# Patient Record
Sex: MALE | Race: ASIAN | NOT HISPANIC OR LATINO
[De-identification: names, ages, dates, MRNs, and addresses within clinical notes are randomized per-mention and may not be internally consistent; named-entity substitution may affect disease eponyms.]

---

## 2017-03-23 ENCOUNTER — NON-APPOINTMENT (OUTPATIENT)
Age: 72
End: 2017-03-23

## 2017-03-23 ENCOUNTER — APPOINTMENT (OUTPATIENT)
Dept: CARDIOLOGY | Facility: CLINIC | Age: 72
End: 2017-03-23

## 2017-03-23 VITALS
SYSTOLIC BLOOD PRESSURE: 132 MMHG | WEIGHT: 154 LBS | HEIGHT: 64 IN | DIASTOLIC BLOOD PRESSURE: 62 MMHG | OXYGEN SATURATION: 97 % | BODY MASS INDEX: 26.29 KG/M2 | HEART RATE: 58 BPM

## 2017-04-05 LAB
24R-OH-CALCIDIOL SERPL-MCNC: 61.3 PG/ML
ALBUMIN SERPL ELPH-MCNC: 4.5 G/DL
ALP BLD-CCNC: 58 U/L
ALT SERPL-CCNC: 36 U/L
ANION GAP SERPL CALC-SCNC: 12 MMOL/L
AST SERPL-CCNC: 34 U/L
BASOPHILS # BLD AUTO: 0.03 K/UL
BASOPHILS NFR BLD AUTO: 0.6 %
BILIRUB SERPL-MCNC: 0.5 MG/DL
BUN SERPL-MCNC: 12 MG/DL
CALCIUM SERPL-MCNC: 9.5 MG/DL
CHLORIDE SERPL-SCNC: 101 MMOL/L
CHOLEST SERPL-MCNC: 125 MG/DL
CHOLEST/HDLC SERPL: 2.9 RATIO
CO2 SERPL-SCNC: 28 MMOL/L
CREAT SERPL-MCNC: 0.99 MG/DL
EOSINOPHIL # BLD AUTO: 0.1 K/UL
EOSINOPHIL NFR BLD AUTO: 2 %
GLUCOSE SERPL-MCNC: 97 MG/DL
HBA1C MFR BLD HPLC: 5.9 %
HCT VFR BLD CALC: 43 %
HDLC SERPL-MCNC: 43 MG/DL
HGB BLD-MCNC: 14.4 G/DL
IMM GRANULOCYTES NFR BLD AUTO: 0.2 %
LDLC SERPL CALC-MCNC: 48 MG/DL
LYMPHOCYTES # BLD AUTO: 1.76 K/UL
LYMPHOCYTES NFR BLD AUTO: 34.9 %
MAN DIFF?: NORMAL
MCHC RBC-ENTMCNC: 29.1 PG
MCHC RBC-ENTMCNC: 33.5 GM/DL
MCV RBC AUTO: 86.9 FL
MONOCYTES # BLD AUTO: 0.53 K/UL
MONOCYTES NFR BLD AUTO: 10.5 %
NEUTROPHILS # BLD AUTO: 2.61 K/UL
NEUTROPHILS NFR BLD AUTO: 51.8 %
PLATELET # BLD AUTO: 139 K/UL
POTASSIUM SERPL-SCNC: 4.7 MMOL/L
PROT SERPL-MCNC: 7.4 G/DL
PSA SERPL-MCNC: 3.28 NG/ML
RBC # BLD: 4.95 M/UL
RBC # FLD: 13.7 %
SODIUM SERPL-SCNC: 141 MMOL/L
TRIGL SERPL-MCNC: 169 MG/DL
TSH SERPL-ACNC: 3.56 UIU/ML
WBC # FLD AUTO: 5.04 K/UL

## 2017-05-15 ENCOUNTER — RX RENEWAL (OUTPATIENT)
Age: 72
End: 2017-05-15

## 2017-05-16 ENCOUNTER — MEDICATION RENEWAL (OUTPATIENT)
Age: 72
End: 2017-05-16

## 2017-06-07 ENCOUNTER — APPOINTMENT (OUTPATIENT)
Dept: CARDIOLOGY | Facility: CLINIC | Age: 72
End: 2017-06-07

## 2017-06-22 ENCOUNTER — APPOINTMENT (OUTPATIENT)
Dept: CARDIOLOGY | Facility: CLINIC | Age: 72
End: 2017-06-22

## 2017-06-22 ENCOUNTER — NON-APPOINTMENT (OUTPATIENT)
Age: 72
End: 2017-06-22

## 2017-06-22 VITALS
BODY MASS INDEX: 26.8 KG/M2 | HEIGHT: 64 IN | DIASTOLIC BLOOD PRESSURE: 62 MMHG | SYSTOLIC BLOOD PRESSURE: 111 MMHG | HEART RATE: 66 BPM | OXYGEN SATURATION: 96 % | WEIGHT: 157 LBS

## 2017-11-13 ENCOUNTER — NON-APPOINTMENT (OUTPATIENT)
Age: 72
End: 2017-11-13

## 2017-11-13 ENCOUNTER — APPOINTMENT (OUTPATIENT)
Dept: CARDIOLOGY | Facility: CLINIC | Age: 72
End: 2017-11-13
Payer: MEDICARE

## 2017-11-13 VITALS — HEART RATE: 52 BPM | OXYGEN SATURATION: 97 % | DIASTOLIC BLOOD PRESSURE: 74 MMHG | SYSTOLIC BLOOD PRESSURE: 145 MMHG

## 2017-11-13 VITALS — BODY MASS INDEX: 26.8 KG/M2 | HEIGHT: 64 IN | WEIGHT: 157 LBS

## 2017-11-13 VITALS — DIASTOLIC BLOOD PRESSURE: 68 MMHG | SYSTOLIC BLOOD PRESSURE: 120 MMHG

## 2017-11-13 PROCEDURE — 93000 ELECTROCARDIOGRAM COMPLETE: CPT

## 2017-11-13 PROCEDURE — 99214 OFFICE O/P EST MOD 30 MIN: CPT | Mod: 25

## 2017-11-13 RX ORDER — DOXYCYCLINE HYCLATE 100 MG/1
100 CAPSULE ORAL
Qty: 20 | Refills: 0 | Status: DISCONTINUED | COMMUNITY
Start: 2017-06-22 | End: 2017-11-13

## 2018-01-17 ENCOUNTER — APPOINTMENT (OUTPATIENT)
Dept: ORTHOPEDIC SURGERY | Facility: CLINIC | Age: 73
End: 2018-01-17
Payer: MEDICARE

## 2018-01-17 VITALS
HEIGHT: 62 IN | HEART RATE: 63 BPM | WEIGHT: 158 LBS | DIASTOLIC BLOOD PRESSURE: 73 MMHG | SYSTOLIC BLOOD PRESSURE: 135 MMHG | BODY MASS INDEX: 29.08 KG/M2

## 2018-01-17 DIAGNOSIS — M65.311 TRIGGER THUMB, RIGHT THUMB: ICD-10-CM

## 2018-01-17 PROCEDURE — 99204 OFFICE O/P NEW MOD 45 MIN: CPT

## 2018-01-17 PROCEDURE — 72170 X-RAY EXAM OF PELVIS: CPT | Mod: 59

## 2018-01-17 PROCEDURE — 72110 X-RAY EXAM L-2 SPINE 4/>VWS: CPT

## 2018-01-20 ENCOUNTER — APPOINTMENT (OUTPATIENT)
Dept: MRI IMAGING | Facility: CLINIC | Age: 73
End: 2018-01-20
Payer: MEDICARE

## 2018-01-20 ENCOUNTER — OUTPATIENT (OUTPATIENT)
Dept: OUTPATIENT SERVICES | Facility: HOSPITAL | Age: 73
LOS: 1 days | End: 2018-01-20
Payer: MEDICARE

## 2018-01-20 ENCOUNTER — APPOINTMENT (OUTPATIENT)
Dept: MRI IMAGING | Facility: CLINIC | Age: 73
End: 2018-01-20

## 2018-01-20 DIAGNOSIS — M48.062 SPINAL STENOSIS, LUMBAR REGION WITH NEUROGENIC CLAUDICATION: ICD-10-CM

## 2018-01-20 DIAGNOSIS — M54.5 LOW BACK PAIN: ICD-10-CM

## 2018-01-20 PROCEDURE — 72141 MRI NECK SPINE W/O DYE: CPT | Mod: 26

## 2018-01-20 PROCEDURE — 72141 MRI NECK SPINE W/O DYE: CPT

## 2018-01-22 ENCOUNTER — APPOINTMENT (OUTPATIENT)
Dept: MRI IMAGING | Facility: CLINIC | Age: 73
End: 2018-01-22

## 2018-01-22 ENCOUNTER — OUTPATIENT (OUTPATIENT)
Dept: OUTPATIENT SERVICES | Facility: HOSPITAL | Age: 73
LOS: 1 days | End: 2018-01-22
Payer: MEDICARE

## 2018-01-22 DIAGNOSIS — Z00.8 ENCOUNTER FOR OTHER GENERAL EXAMINATION: ICD-10-CM

## 2018-01-22 PROCEDURE — 72148 MRI LUMBAR SPINE W/O DYE: CPT

## 2018-01-22 PROCEDURE — 72148 MRI LUMBAR SPINE W/O DYE: CPT | Mod: 26

## 2018-01-31 ENCOUNTER — APPOINTMENT (OUTPATIENT)
Dept: ORTHOPEDIC SURGERY | Facility: CLINIC | Age: 73
End: 2018-01-31
Payer: MEDICARE

## 2018-01-31 ENCOUNTER — FORM ENCOUNTER (OUTPATIENT)
Age: 73
End: 2018-01-31

## 2018-01-31 VITALS
DIASTOLIC BLOOD PRESSURE: 79 MMHG | SYSTOLIC BLOOD PRESSURE: 151 MMHG | HEART RATE: 64 BPM | HEIGHT: 62 IN | WEIGHT: 155 LBS | BODY MASS INDEX: 28.52 KG/M2

## 2018-01-31 PROCEDURE — 99214 OFFICE O/P EST MOD 30 MIN: CPT

## 2018-02-01 ENCOUNTER — OUTPATIENT (OUTPATIENT)
Dept: OUTPATIENT SERVICES | Facility: HOSPITAL | Age: 73
LOS: 1 days | End: 2018-02-01
Payer: MEDICARE

## 2018-02-01 ENCOUNTER — APPOINTMENT (OUTPATIENT)
Dept: CT IMAGING | Facility: CLINIC | Age: 73
End: 2018-02-01

## 2018-02-01 DIAGNOSIS — Z00.8 ENCOUNTER FOR OTHER GENERAL EXAMINATION: ICD-10-CM

## 2018-02-01 PROCEDURE — 72125 CT NECK SPINE W/O DYE: CPT

## 2018-02-01 PROCEDURE — 76376 3D RENDER W/INTRP POSTPROCES: CPT | Mod: 26

## 2018-02-01 PROCEDURE — 72125 CT NECK SPINE W/O DYE: CPT | Mod: 26

## 2018-02-01 PROCEDURE — 76376 3D RENDER W/INTRP POSTPROCES: CPT

## 2018-02-05 ENCOUNTER — APPOINTMENT (OUTPATIENT)
Dept: CARDIOLOGY | Facility: CLINIC | Age: 73
End: 2018-02-05
Payer: MEDICARE

## 2018-02-05 ENCOUNTER — NON-APPOINTMENT (OUTPATIENT)
Age: 73
End: 2018-02-05

## 2018-02-05 VITALS — OXYGEN SATURATION: 99 % | SYSTOLIC BLOOD PRESSURE: 150 MMHG | HEART RATE: 67 BPM | DIASTOLIC BLOOD PRESSURE: 73 MMHG

## 2018-02-05 VITALS — WEIGHT: 162 LBS | HEIGHT: 62 IN | BODY MASS INDEX: 29.81 KG/M2

## 2018-02-05 LAB
25(OH)D3 SERPL-MCNC: 26.9 NG/ML
ALBUMIN SERPL ELPH-MCNC: 4.5 G/DL
ALP BLD-CCNC: 56 U/L
ALT SERPL-CCNC: 39 U/L
ANION GAP SERPL CALC-SCNC: 12 MMOL/L
AST SERPL-CCNC: 34 U/L
BASOPHILS # BLD AUTO: 0.03 K/UL
BASOPHILS NFR BLD AUTO: 0.4 %
BILIRUB SERPL-MCNC: 0.7 MG/DL
BUN SERPL-MCNC: 13 MG/DL
CALCIUM SERPL-MCNC: 10.1 MG/DL
CHLORIDE SERPL-SCNC: 99 MMOL/L
CHOLEST SERPL-MCNC: 127 MG/DL
CHOLEST/HDLC SERPL: 3.3 RATIO
CK SERPL-CCNC: 277 U/L
CO2 SERPL-SCNC: 28 MMOL/L
CREAT SERPL-MCNC: 1.28 MG/DL
EOSINOPHIL # BLD AUTO: 0.18 K/UL
EOSINOPHIL NFR BLD AUTO: 2.5 %
GLUCOSE SERPL-MCNC: 94 MG/DL
HBA1C MFR BLD HPLC: 5.9 %
HCT VFR BLD CALC: 47.6 %
HDLC SERPL-MCNC: 39 MG/DL
HGB BLD-MCNC: 15.6 G/DL
IMM GRANULOCYTES NFR BLD AUTO: 0.1 %
LDLC SERPL CALC-MCNC: 57 MG/DL
LYMPHOCYTES # BLD AUTO: 2.63 K/UL
LYMPHOCYTES NFR BLD AUTO: 36.9 %
MAN DIFF?: NORMAL
MCHC RBC-ENTMCNC: 29.3 PG
MCHC RBC-ENTMCNC: 32.8 GM/DL
MCV RBC AUTO: 89.3 FL
MONOCYTES # BLD AUTO: 0.48 K/UL
MONOCYTES NFR BLD AUTO: 6.7 %
NEUTROPHILS # BLD AUTO: 3.79 K/UL
NEUTROPHILS NFR BLD AUTO: 53.4 %
PLATELET # BLD AUTO: 132 K/UL
POTASSIUM SERPL-SCNC: 4.9 MMOL/L
PROT SERPL-MCNC: 8.1 G/DL
PSA FREE FLD-MCNC: 28.6
PSA FREE SERPL-MCNC: 1.27 NG/ML
PSA SERPL-MCNC: 4.44 NG/ML
RBC # BLD: 5.33 M/UL
RBC # FLD: 13.5 %
SODIUM SERPL-SCNC: 139 MMOL/L
T4 FREE SERPL-MCNC: 1.1 NG/DL
TRIGL SERPL-MCNC: 157 MG/DL
TSH SERPL-ACNC: 5.42 UIU/ML
WBC # FLD AUTO: 7.12 K/UL

## 2018-02-05 PROCEDURE — 93000 ELECTROCARDIOGRAM COMPLETE: CPT

## 2018-02-05 PROCEDURE — 99214 OFFICE O/P EST MOD 30 MIN: CPT | Mod: 25

## 2018-02-09 ENCOUNTER — OUTPATIENT (OUTPATIENT)
Dept: OUTPATIENT SERVICES | Facility: HOSPITAL | Age: 73
LOS: 1 days | End: 2018-02-09
Payer: MEDICARE

## 2018-02-09 ENCOUNTER — APPOINTMENT (OUTPATIENT)
Dept: ORTHOPEDIC SURGERY | Facility: HOSPITAL | Age: 73
End: 2018-02-09

## 2018-02-09 DIAGNOSIS — M48.061 SPINAL STENOSIS, LUMBAR REGION WITHOUT NEUROGENIC CLAUDICATION: ICD-10-CM

## 2018-02-09 PROCEDURE — 64483 NJX AA&/STRD TFRM EPI L/S 1: CPT

## 2018-02-09 PROCEDURE — 64484 NJX AA&/STRD TFRM EPI L/S EA: CPT | Mod: LT

## 2018-02-09 PROCEDURE — 64483 NJX AA&/STRD TFRM EPI L/S 1: CPT | Mod: LT

## 2018-02-09 PROCEDURE — 77003 FLUOROGUIDE FOR SPINE INJECT: CPT

## 2018-02-09 PROCEDURE — 64484 NJX AA&/STRD TFRM EPI L/S EA: CPT

## 2018-02-12 ENCOUNTER — RX RENEWAL (OUTPATIENT)
Age: 73
End: 2018-02-12

## 2018-02-23 ENCOUNTER — APPOINTMENT (OUTPATIENT)
Dept: ORTHOPEDIC SURGERY | Facility: CLINIC | Age: 73
End: 2018-02-23
Payer: MEDICARE

## 2018-02-23 VITALS
SYSTOLIC BLOOD PRESSURE: 194 MMHG | BODY MASS INDEX: 28.34 KG/M2 | WEIGHT: 154 LBS | HEART RATE: 72 BPM | DIASTOLIC BLOOD PRESSURE: 77 MMHG | HEIGHT: 62 IN

## 2018-02-23 DIAGNOSIS — M48.8X9 OTHER SPECIFIED SPONDYLOPATHIES, SITE UNSPECIFIED: ICD-10-CM

## 2018-02-23 PROCEDURE — 99214 OFFICE O/P EST MOD 30 MIN: CPT

## 2018-02-23 RX ORDER — MELOXICAM 15 MG/1
15 TABLET ORAL
Qty: 30 | Refills: 2 | Status: DISCONTINUED | COMMUNITY
Start: 2018-01-17 | End: 2018-02-23

## 2018-02-23 RX ORDER — GABAPENTIN 300 MG/1
300 CAPSULE ORAL
Qty: 30 | Refills: 2 | Status: DISCONTINUED | COMMUNITY
Start: 2018-01-17 | End: 2018-02-23

## 2018-02-23 RX ORDER — TIZANIDINE 4 MG/1
4 TABLET ORAL
Qty: 50 | Refills: 0 | Status: DISCONTINUED | COMMUNITY
Start: 2018-01-17 | End: 2018-02-23

## 2018-07-19 ENCOUNTER — NON-APPOINTMENT (OUTPATIENT)
Age: 73
End: 2018-07-19

## 2018-07-19 ENCOUNTER — APPOINTMENT (OUTPATIENT)
Dept: CARDIOLOGY | Facility: CLINIC | Age: 73
End: 2018-07-19
Payer: MEDICARE

## 2018-07-19 VITALS
HEART RATE: 57 BPM | DIASTOLIC BLOOD PRESSURE: 68 MMHG | WEIGHT: 161 LBS | HEIGHT: 62 IN | OXYGEN SATURATION: 97 % | SYSTOLIC BLOOD PRESSURE: 126 MMHG | BODY MASS INDEX: 29.63 KG/M2

## 2018-07-19 LAB
25(OH)D3 SERPL-MCNC: 27.1 NG/ML
ALBUMIN SERPL ELPH-MCNC: 4.7 G/DL
ALP BLD-CCNC: 60 U/L
ALT SERPL-CCNC: 30 U/L
ANION GAP SERPL CALC-SCNC: 12 MMOL/L
AST SERPL-CCNC: 30 U/L
BASOPHILS # BLD AUTO: 0.02 K/UL
BASOPHILS NFR BLD AUTO: 0.3 %
BILIRUB SERPL-MCNC: 0.6 MG/DL
BUN SERPL-MCNC: 11 MG/DL
CALCIUM SERPL-MCNC: 9.4 MG/DL
CHLORIDE SERPL-SCNC: 104 MMOL/L
CHOLEST SERPL-MCNC: 130 MG/DL
CHOLEST/HDLC SERPL: 3.3 RATIO
CO2 SERPL-SCNC: 27 MMOL/L
CREAT SERPL-MCNC: 1.19 MG/DL
EOSINOPHIL # BLD AUTO: 0.29 K/UL
EOSINOPHIL NFR BLD AUTO: 4.7 %
GLUCOSE SERPL-MCNC: 106 MG/DL
HBA1C MFR BLD HPLC: 5.9 %
HCT VFR BLD CALC: 45.4 %
HDLC SERPL-MCNC: 39 MG/DL
HGB BLD-MCNC: 14.9 G/DL
IMM GRANULOCYTES NFR BLD AUTO: 0.2 %
LDLC SERPL CALC-MCNC: 54 MG/DL
LYMPHOCYTES # BLD AUTO: 2.14 K/UL
LYMPHOCYTES NFR BLD AUTO: 34.6 %
MAN DIFF?: NORMAL
MCHC RBC-ENTMCNC: 29.6 PG
MCHC RBC-ENTMCNC: 32.8 GM/DL
MCV RBC AUTO: 90.3 FL
MONOCYTES # BLD AUTO: 0.31 K/UL
MONOCYTES NFR BLD AUTO: 5 %
NEUTROPHILS # BLD AUTO: 3.41 K/UL
NEUTROPHILS NFR BLD AUTO: 55.2 %
PLATELET # BLD AUTO: 134 K/UL
POTASSIUM SERPL-SCNC: 4.8 MMOL/L
PROT SERPL-MCNC: 7.4 G/DL
RBC # BLD: 5.03 M/UL
RBC # FLD: 13.3 %
SODIUM SERPL-SCNC: 143 MMOL/L
TRIGL SERPL-MCNC: 185 MG/DL
TSH SERPL-ACNC: 5.6 UIU/ML
WBC # FLD AUTO: 6.18 K/UL

## 2018-07-19 PROCEDURE — 99214 OFFICE O/P EST MOD 30 MIN: CPT | Mod: 25

## 2018-07-19 PROCEDURE — 93000 ELECTROCARDIOGRAM COMPLETE: CPT

## 2018-07-27 ENCOUNTER — MEDICATION RENEWAL (OUTPATIENT)
Age: 73
End: 2018-07-27

## 2018-08-13 ENCOUNTER — MEDICATION RENEWAL (OUTPATIENT)
Age: 73
End: 2018-08-13

## 2018-10-10 ENCOUNTER — MEDICATION RENEWAL (OUTPATIENT)
Age: 73
End: 2018-10-10

## 2018-10-10 ENCOUNTER — RX RENEWAL (OUTPATIENT)
Age: 73
End: 2018-10-10

## 2018-10-12 ENCOUNTER — LABORATORY RESULT (OUTPATIENT)
Age: 73
End: 2018-10-12

## 2018-10-16 LAB
25(OH)D3 SERPL-MCNC: 25.2 NG/ML
ALBUMIN SERPL ELPH-MCNC: 4.9 G/DL
ALP BLD-CCNC: 58 U/L
ALT SERPL-CCNC: 29 U/L
ANION GAP SERPL CALC-SCNC: 12 MMOL/L
AST SERPL-CCNC: 27 U/L
BASOPHILS # BLD AUTO: 0.02 K/UL
BASOPHILS NFR BLD AUTO: 0.3 %
BILIRUB SERPL-MCNC: 0.6 MG/DL
BUN SERPL-MCNC: 13 MG/DL
CALCIUM SERPL-MCNC: 10 MG/DL
CHLORIDE SERPL-SCNC: 103 MMOL/L
CHOLEST SERPL-MCNC: 134 MG/DL
CHOLEST/HDLC SERPL: 3.4 RATIO
CO2 SERPL-SCNC: 29 MMOL/L
CREAT SERPL-MCNC: 1.21 MG/DL
EOSINOPHIL # BLD AUTO: 0.13 K/UL
EOSINOPHIL NFR BLD AUTO: 2.2 %
GLUCOSE SERPL-MCNC: 88 MG/DL
HBA1C MFR BLD HPLC: 6 %
HCT VFR BLD CALC: 45.3 %
HDLC SERPL-MCNC: 40 MG/DL
HGB BLD-MCNC: 14.4 G/DL
IMM GRANULOCYTES NFR BLD AUTO: 0.2 %
LDLC SERPL CALC-MCNC: 54 MG/DL
LYMPHOCYTES # BLD AUTO: 2.46 K/UL
LYMPHOCYTES NFR BLD AUTO: 41.1 %
MAN DIFF?: NORMAL
MCHC RBC-ENTMCNC: 28.4 PG
MCHC RBC-ENTMCNC: 31.8 GM/DL
MCV RBC AUTO: 89.3 FL
MONOCYTES # BLD AUTO: 0.5 K/UL
MONOCYTES NFR BLD AUTO: 8.4 %
NEUTROPHILS # BLD AUTO: 2.86 K/UL
NEUTROPHILS NFR BLD AUTO: 47.8 %
PLATELET # BLD AUTO: 135 K/UL
POTASSIUM SERPL-SCNC: 4.4 MMOL/L
PROT SERPL-MCNC: 7.4 G/DL
RBC # BLD: 5.07 M/UL
RBC # FLD: 13.9 %
SODIUM SERPL-SCNC: 144 MMOL/L
T3RU NFR SERPL: 1.07 INDEX
T4 SERPL-MCNC: 7.3 UG/DL
TRIGL SERPL-MCNC: 198 MG/DL
TSH SERPL-ACNC: 5.84 UIU/ML
TSH SERPL-ACNC: 5.96 UIU/ML
WBC # FLD AUTO: 5.98 K/UL

## 2018-10-17 ENCOUNTER — MEDICATION RENEWAL (OUTPATIENT)
Age: 73
End: 2018-10-17

## 2019-03-21 ENCOUNTER — NON-APPOINTMENT (OUTPATIENT)
Age: 74
End: 2019-03-21

## 2019-03-21 ENCOUNTER — APPOINTMENT (OUTPATIENT)
Dept: CARDIOLOGY | Facility: CLINIC | Age: 74
End: 2019-03-21
Payer: MEDICARE

## 2019-03-21 VITALS
HEIGHT: 62 IN | DIASTOLIC BLOOD PRESSURE: 76 MMHG | SYSTOLIC BLOOD PRESSURE: 161 MMHG | WEIGHT: 159 LBS | HEART RATE: 55 BPM | BODY MASS INDEX: 29.26 KG/M2

## 2019-03-21 VITALS — DIASTOLIC BLOOD PRESSURE: 64 MMHG | SYSTOLIC BLOOD PRESSURE: 140 MMHG

## 2019-03-21 PROCEDURE — 93000 ELECTROCARDIOGRAM COMPLETE: CPT

## 2019-03-21 PROCEDURE — 99214 OFFICE O/P EST MOD 30 MIN: CPT | Mod: 25

## 2019-03-21 NOTE — DISCUSSION/SUMMARY
[Mild Aortic Stenosis] : mild aortic stenosis [Echocardiogram] : echocardiogram [None] : none [Family] : the patient's family [Coronary Artery Disease] : coronary artery disease [Stable] : stable [Dietary Modification] : dietary modification [Exercise Regimen] : an exercise regimen [Weight Reduction] : weight reduction [Hyperlipidemia] : hyperlipidemia [Improving] : improving [Continue] : continuing statins [Diet Modification] : diet modification [Exercise] : exercise [Weight Loss] : weight loss [<70] : goal is <70 [<150] : goal is <150 [>40] : goal is >40 [Patient] : the patient [FreeTextEntry1] : \par \par

## 2019-03-21 NOTE — ASSESSMENT
[FreeTextEntry1] : Patient with above history\par \par \par History of old MI and, LAD multiple stents, last one 2005, improved ejection fraction without active cardiac symptoms. Continue his current medications.\par \par Hyperlipidemia: Mildly elevated triglycerides noncompliant to diet, LDL 54. Continue dietary restriction and medication. Monitor lipid profile and liver function tests\par \par Hypertension: Mildly uncontrolled as patient is not very compliant to low-salt diet, advise the patient to follow low-salt diet and home blood pressure monitoring.\par \par \par Hypothyroidism: Mildly elevated TSH, normal T3-T4 continue current dose of Synthroid. Continue monitor TSH levels.\par \par Mild aortic stenosis: Continuing monitor severity of aortic stenosis. Would obtain echocardiogram prior to next visit.

## 2019-03-21 NOTE — PHYSICAL EXAM
[General Appearance - Well Developed] : well developed [Normal Appearance] : normal appearance [Well Groomed] : well groomed [General Appearance - Well Nourished] : well nourished [No Deformities] : no deformities [General Appearance - In No Acute Distress] : no acute distress [Normal Conjunctiva] : the conjunctiva exhibited no abnormalities [Eyelids - No Xanthelasma] : the eyelids demonstrated no xanthelasmas [Normal Oral Mucosa] : normal oral mucosa [No Oral Pallor] : no oral pallor [No Oral Cyanosis] : no oral cyanosis [Normal Jugular Venous A Waves Present] : normal jugular venous A waves present [Normal Jugular Venous V Waves Present] : normal jugular venous V waves present [No Jugular Venous Portillo A Waves] : no jugular venous portillo A waves [Respiration, Rhythm And Depth] : normal respiratory rhythm and effort [Exaggerated Use Of Accessory Muscles For Inspiration] : no accessory muscle use [Auscultation Breath Sounds / Voice Sounds] : lungs were clear to auscultation bilaterally [Heart Rate And Rhythm] : heart rate and rhythm were normal [Heart Sounds] : normal S1 and S2 [Arterial Pulses Normal] : the arterial pulses were normal [Edema] : no peripheral edema present [Veins - Varicosity Changes] : no varicosital changes were noted in the lower extremities [Systolic grade ___/6] : A grade [unfilled]/6 systolic murmur was heard. [Abdomen Soft] : soft [Abdomen Tenderness] : non-tender [Abdomen Mass (___ Cm)] : no abdominal mass palpated [Abnormal Walk] : normal gait [Gait - Sufficient For Exercise Testing] : the gait was sufficient for exercise testing [Nail Clubbing] : no clubbing of the fingernails [Cyanosis, Localized] : no localized cyanosis [Petechial Hemorrhages (___cm)] : no petechial hemorrhages [Skin Color & Pigmentation] : normal skin color and pigmentation [] : no rash [No Venous Stasis] : no venous stasis [Skin Lesions] : no skin lesions [No Skin Ulcers] : no skin ulcer [No Xanthoma] : no  xanthoma was observed [Oriented To Time, Place, And Person] : oriented to person, place, and time [Affect] : the affect was normal [Mood] : the mood was normal [No Anxiety] : not feeling anxious [FreeTextEntry1] : ESM GR 3

## 2019-03-21 NOTE — HISTORY OF PRESENT ILLNESS
[FreeTextEntry1] : Patient with history of hypertension and hyperlipidemia, coronary artery disease status post PCI stent 2004,   Prior MI ,  hypothyroid came for follow up , Patient was in [place] for 3 months , patient doing well , denies any chest pain or shortness of breath or dizziness , \par \par Patient  does chronic cervical spine pain with restricted motion , recommended surgery , how ever he does not want it , patient did have epidural injection for lower chronic pain which made it better , \par \par Patient blood work  showed elevated TSH normal T4 T3  Hb A1c 6.0 \par \par patient blood pressure is minimal elevated , however as per wife his pressure is normal at home , \par

## 2019-03-21 NOTE — REVIEW OF SYSTEMS
[Negative] : Endocrine [Feeling Fatigued] : not feeling fatigued [Shortness Of Breath] : no shortness of breath [Chest Pain] : no chest pain [Palpitations] : no palpitations

## 2019-03-21 NOTE — REASON FOR VISIT
[Follow-Up - Clinic] : a clinic follow-up of [Abnormal ECG] : an abnormal ECG [Coronary Artery Disease] : coronary artery disease [Hyperlipidemia] : hyperlipidemia [Hypertension] : hypertension [Medication Management] : Medication management [FreeTextEntry1] : \par \par

## 2019-04-04 ENCOUNTER — APPOINTMENT (OUTPATIENT)
Dept: CARDIOLOGY | Facility: CLINIC | Age: 74
End: 2019-04-04
Payer: MEDICARE

## 2019-04-04 PROCEDURE — 93306 TTE W/DOPPLER COMPLETE: CPT

## 2019-04-08 ENCOUNTER — APPOINTMENT (OUTPATIENT)
Dept: ORTHOPEDIC SURGERY | Facility: CLINIC | Age: 74
End: 2019-04-08
Payer: MEDICARE

## 2019-04-08 VITALS — HEIGHT: 64 IN | WEIGHT: 160 LBS | BODY MASS INDEX: 27.31 KG/M2

## 2019-04-08 PROCEDURE — 99213 OFFICE O/P EST LOW 20 MIN: CPT

## 2019-04-08 NOTE — END OF VISIT
[FreeTextEntry3] : I, Bharat Mann MD, ordering physician, have read and attest that all the information, medical decision making and discharge instructions within are true and accurate.

## 2019-04-08 NOTE — CONSULT LETTER
[Dear  ___] : Dear  [unfilled], [Consult Letter:] : I had the pleasure of evaluating your patient, [unfilled]. [Please see my note below.] : Please see my note below. [Sincerely,] : Sincerely, [FreeTextEntry2] : DONAVON KIMBROUGH  [FreeTextEntry3] : Dr. Bharat Mann

## 2019-04-08 NOTE — ADDENDUM
[FreeTextEntry1] : I, Destiny Blair wrote this note acting as a scribe for Dr. Bharat Mann on Apr 08, 2019.

## 2019-04-08 NOTE — DISCUSSION/SUMMARY
[FreeTextEntry1] : The underlying pathophysiology was reviewed with the patient. Treatment options were discussed including: surgical intervention. \par \par The patient wishes to proceed with right CTR at this time. The risks and benefits were reviewed with the patient. All of his questions were answered. He will meet with our surgical scheduler. \par \par

## 2019-04-08 NOTE — PHYSICAL EXAM
[de-identified] : Patient is WDWN, alert, and in no acute distress. Breathing is unlabored. He is grossly oriented to person, place, and time. \par \par Left Wrist: No tenderness, edema, or deformities. There is thenar atrophy. Full ROM with decreased sensation along median and ulnar nerve distribution. \par Tests/Signs: Tinel's sign is positive over carpal tunnel, Phalen's test is positive. Negative Tinel's over the ulnar nerve. \par \par Right Wrist: No tenderness, edema, or deformities. There is thenar atrophy. Full ROM with decreased sensation along median nerve distribution. \par Tests/Signs: Tinel's sign is positive over carpal tunnel, Phalen's test is positive. Negative Tinel's over the ulnar nerve.  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 This is consistent with the clinical diagnosis of CTS. There is evidence of high-mid cervical radiculopathy. 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 Mild T2 signal hyperintensity in the cervical cord at C3-4 may be artifactual in nature or reflect mild myelomalacia.

## 2019-04-08 NOTE — HISTORY OF PRESENT ILLNESS
[FreeTextEntry1] : Pt c/o bilateral hand numbness and tingling for 5x years getting progressively worse. The symptoms are worse in the thumb, index and long on the right hand. The numbness is constant and is currently present.  He has intermittent burning in the hands.  The pain occasionally wakes him from sleep.  He cannot identify exacerbating factors.  The numbness is in bilateral thumbs, index finger, and long fingers.  He does not wear wrist support splints.  He has never had a cortisone injection. An EMG was performed on 03/27/2019 which showed bilateral CTS.  He presents today to discuss his options. He denies DM and finger triggering.

## 2019-04-30 ENCOUNTER — OUTPATIENT (OUTPATIENT)
Dept: OUTPATIENT SERVICES | Facility: HOSPITAL | Age: 74
LOS: 1 days | End: 2019-04-30
Payer: MEDICARE

## 2019-04-30 VITALS
SYSTOLIC BLOOD PRESSURE: 124 MMHG | OXYGEN SATURATION: 98 % | TEMPERATURE: 98 F | DIASTOLIC BLOOD PRESSURE: 68 MMHG | HEIGHT: 63.5 IN | RESPIRATION RATE: 18 BRPM | HEART RATE: 98 BPM

## 2019-04-30 DIAGNOSIS — Z98.49 CATARACT EXTRACTION STATUS, UNSPECIFIED EYE: Chronic | ICD-10-CM

## 2019-04-30 DIAGNOSIS — Z98.890 OTHER SPECIFIED POSTPROCEDURAL STATES: Chronic | ICD-10-CM

## 2019-04-30 DIAGNOSIS — Z01.818 ENCOUNTER FOR OTHER PREPROCEDURAL EXAMINATION: ICD-10-CM

## 2019-04-30 DIAGNOSIS — G56.01 CARPAL TUNNEL SYNDROME, RIGHT UPPER LIMB: ICD-10-CM

## 2019-04-30 LAB
ANION GAP SERPL CALC-SCNC: 9 MMOL/L — SIGNIFICANT CHANGE UP (ref 5–17)
BUN SERPL-MCNC: 18 MG/DL — SIGNIFICANT CHANGE UP (ref 7–23)
CALCIUM SERPL-MCNC: 9.7 MG/DL — SIGNIFICANT CHANGE UP (ref 8.4–10.5)
CHLORIDE SERPL-SCNC: 104 MMOL/L — SIGNIFICANT CHANGE UP (ref 96–108)
CO2 SERPL-SCNC: 29 MMOL/L — SIGNIFICANT CHANGE UP (ref 22–31)
CREAT SERPL-MCNC: 1.15 MG/DL — SIGNIFICANT CHANGE UP (ref 0.5–1.3)
GLUCOSE SERPL-MCNC: 120 MG/DL — HIGH (ref 70–99)
HCT VFR BLD CALC: 47.1 % — SIGNIFICANT CHANGE UP (ref 39–50)
HGB BLD-MCNC: 16 G/DL — SIGNIFICANT CHANGE UP (ref 13–17)
MCHC RBC-ENTMCNC: 30.1 PG — SIGNIFICANT CHANGE UP (ref 27–34)
MCHC RBC-ENTMCNC: 34 GM/DL — SIGNIFICANT CHANGE UP (ref 32–36)
MCV RBC AUTO: 88.7 FL — SIGNIFICANT CHANGE UP (ref 80–100)
NRBC # BLD: 0 /100 WBCS — SIGNIFICANT CHANGE UP (ref 0–0)
PLATELET # BLD AUTO: 143 K/UL — LOW (ref 150–400)
POTASSIUM SERPL-MCNC: 4.2 MMOL/L — SIGNIFICANT CHANGE UP (ref 3.5–5.3)
POTASSIUM SERPL-SCNC: 4.2 MMOL/L — SIGNIFICANT CHANGE UP (ref 3.5–5.3)
RBC # BLD: 5.31 M/UL — SIGNIFICANT CHANGE UP (ref 4.2–5.8)
RBC # FLD: 13.2 % — SIGNIFICANT CHANGE UP (ref 10.3–14.5)
SODIUM SERPL-SCNC: 142 MMOL/L — SIGNIFICANT CHANGE UP (ref 135–145)
WBC # BLD: 5.21 K/UL — SIGNIFICANT CHANGE UP (ref 3.8–10.5)
WBC # FLD AUTO: 5.21 K/UL — SIGNIFICANT CHANGE UP (ref 3.8–10.5)

## 2019-04-30 PROCEDURE — G0463: CPT

## 2019-04-30 PROCEDURE — 36415 COLL VENOUS BLD VENIPUNCTURE: CPT

## 2019-04-30 PROCEDURE — 80048 BASIC METABOLIC PNL TOTAL CA: CPT

## 2019-04-30 PROCEDURE — 85027 COMPLETE CBC AUTOMATED: CPT

## 2019-04-30 NOTE — H&P PST ADULT - NSICDXPASTMEDICALHX_GEN_ALL_CORE_FT
PAST MEDICAL HISTORY:  CAD (coronary artery disease)     Hyperlipidemia     Hypertension     Hypothyroidism     Myocardial infarction 2004 with 4 stents

## 2019-04-30 NOTE — H&P PST ADULT - NSANTHOSAYNRD_GEN_A_CORE
No. ROD screening performed.  STOP BANG Legend: 0-2 = LOW Risk; 3-4 = INTERMEDIATE Risk; 5-8 = HIGH Risk

## 2019-04-30 NOTE — H&P PST ADULT - NSICDXPROBLEM_GEN_ALL_CORE_FT
PROBLEM DIAGNOSES  Problem: Right carpal tunnel syndrome  Assessment and Plan: right carpal tunnel release on 5/10/19  medical/cardiac clearance  will get instructiions on plavix/aspirin from cardiologist

## 2019-04-30 NOTE — H&P PST ADULT - HISTORY OF PRESENT ILLNESS
72 y/o male with right carpal tunnel syndrome. Had it for many years. pain with certain ROM and was advised surgery

## 2019-05-01 PROBLEM — I21.9 ACUTE MYOCARDIAL INFARCTION, UNSPECIFIED: Chronic | Status: ACTIVE | Noted: 2019-04-29

## 2019-05-02 ENCOUNTER — APPOINTMENT (OUTPATIENT)
Dept: INTERNAL MEDICINE | Facility: CLINIC | Age: 74
End: 2019-05-02
Payer: MEDICARE

## 2019-05-02 VITALS
DIASTOLIC BLOOD PRESSURE: 60 MMHG | HEART RATE: 66 BPM | OXYGEN SATURATION: 97 % | HEIGHT: 64 IN | RESPIRATION RATE: 14 BRPM | WEIGHT: 156 LBS | SYSTOLIC BLOOD PRESSURE: 110 MMHG | BODY MASS INDEX: 26.63 KG/M2 | TEMPERATURE: 97.6 F

## 2019-05-02 PROBLEM — I25.10 ATHEROSCLEROTIC HEART DISEASE OF NATIVE CORONARY ARTERY WITHOUT ANGINA PECTORIS: Chronic | Status: ACTIVE | Noted: 2019-04-29

## 2019-05-02 PROBLEM — E78.5 HYPERLIPIDEMIA, UNSPECIFIED: Chronic | Status: ACTIVE | Noted: 2019-04-29

## 2019-05-02 PROBLEM — I10 ESSENTIAL (PRIMARY) HYPERTENSION: Chronic | Status: ACTIVE | Noted: 2019-04-29

## 2019-05-02 PROBLEM — E03.9 HYPOTHYROIDISM, UNSPECIFIED: Chronic | Status: ACTIVE | Noted: 2019-04-29

## 2019-05-02 PROCEDURE — 99204 OFFICE O/P NEW MOD 45 MIN: CPT

## 2019-05-02 NOTE — PHYSICAL EXAM
[No Acute Distress] : no acute distress [Well Nourished] : well nourished [Well Developed] : well developed [Well-Appearing] : well-appearing [PERRL] : pupils equal round and reactive to light [Normal Sclera/Conjunctiva] : normal sclera/conjunctiva [Normal Outer Ear/Nose] : the outer ears and nose were normal in appearance [EOMI] : extraocular movements intact [No JVD] : no jugular venous distention [Normal Oropharynx] : the oropharynx was normal [Supple] : supple [No Lymphadenopathy] : no lymphadenopathy [Thyroid Normal, No Nodules] : the thyroid was normal and there were no nodules present [No Respiratory Distress] : no respiratory distress  [No Accessory Muscle Use] : no accessory muscle use [Clear to Auscultation] : lungs were clear to auscultation bilaterally [Normal Rate] : normal rate  [Normal S1, S2] : normal S1 and S2 [Regular Rhythm] : with a regular rhythm [No Carotid Bruits] : no carotid bruits [No Murmur] : no murmur heard [No Varicosities] : no varicosities [No Abdominal Bruit] : a ~M bruit was not heard ~T in the abdomen [No Edema] : there was no peripheral edema [Pedal Pulses Present] : the pedal pulses are present [No Palpable Aorta] : no palpable aorta [No Extremity Clubbing/Cyanosis] : no extremity clubbing/cyanosis [Non Tender] : non-tender [Soft] : abdomen soft [Non-distended] : non-distended [No HSM] : no HSM [No Masses] : no abdominal mass palpated [Normal Bowel Sounds] : normal bowel sounds [Normal Posterior Cervical Nodes] : no posterior cervical lymphadenopathy [Normal Anterior Cervical Nodes] : no anterior cervical lymphadenopathy [No CVA Tenderness] : no CVA  tenderness [No Joint Swelling] : no joint swelling [No Spinal Tenderness] : no spinal tenderness [Grossly Normal Strength/Tone] : grossly normal strength/tone [Normal Gait] : normal gait [No Rash] : no rash [Coordination Grossly Intact] : coordination grossly intact [No Focal Deficits] : no focal deficits [Deep Tendon Reflexes (DTR)] : deep tendon reflexes were 2+ and symmetric [Normal Affect] : the affect was normal [Normal Insight/Judgement] : insight and judgment were intact

## 2019-05-02 NOTE — ASSESSMENT
[Patient Optimized for Surgery] : Patient optimized for surgery [Cardiology consultation] : Cardiology consultation [FreeTextEntry4] : Pt with CAD- pt to get clearance by cardiology ( [facility]) Discussed with cardiology to hold Plavix 7 days prior to procedure.\par AM of procedure to take metoprolol , losartan and levothyroxine am of procedure with sips of water

## 2019-05-02 NOTE — HISTORY OF PRESENT ILLNESS
[Aortic Stenosis] : aortic stenosis [Coronary Artery Disease] : coronary artery disease [No Pertinent Pulmonary History] : no history of asthma, COPD, sleep apnea, or smoking [No Adverse Anesthesia Reaction] : no adverse anesthesia reaction in self or family member [FreeTextEntry1] : Carpal tunnel right hand [FreeTextEntry2] : May 10, 2019 [FreeTextEntry3] : Dr. Mann [FreeTextEntry4] : Pt with history of CAD- stable and followed by cardiology\par Good exercise tolerance. \par Walks 3 miles daily.

## 2019-05-05 ENCOUNTER — RESULT CHARGE (OUTPATIENT)
Age: 74
End: 2019-05-05

## 2019-05-06 ENCOUNTER — APPOINTMENT (OUTPATIENT)
Dept: CARDIOLOGY | Facility: CLINIC | Age: 74
End: 2019-05-06
Payer: MEDICARE

## 2019-05-06 VITALS
WEIGHT: 156 LBS | OXYGEN SATURATION: 96 % | SYSTOLIC BLOOD PRESSURE: 130 MMHG | HEIGHT: 64 IN | DIASTOLIC BLOOD PRESSURE: 67 MMHG | HEART RATE: 61 BPM | BODY MASS INDEX: 26.63 KG/M2

## 2019-05-06 PROCEDURE — 99214 OFFICE O/P EST MOD 30 MIN: CPT

## 2019-05-06 NOTE — REASON FOR VISIT
[Abnormal ECG] : an abnormal ECG [Follow-Up - Clinic] : a clinic follow-up of [Hyperlipidemia] : hyperlipidemia [Coronary Artery Disease] : coronary artery disease [Medication Management] : Medication management [Hypertension] : hypertension [FreeTextEntry1] : \par \par

## 2019-05-06 NOTE — HISTORY OF PRESENT ILLNESS
[FreeTextEntry1] : Patient with history of hypertension and hyperlipidemia, coronary artery disease status post PCI stent 2004,   Prior MI ,  hypothyroid came for preop cardiac wise evaluation for carpel tunnel surgery ,\par \par  , denies any chest pain or shortness of breath or dizziness ,  patient exercise regularly , 5 miles a day without chest pain \par \par Patient  does chronic cervical spine pain with restricted motion , recommended surgery , how ever he does not want it , patient did have epidural injection for lower chronic pain which made it better , \par \par Patient blood work  showed  normal renal function , \par \par patient blood pressure is normal \par

## 2019-05-06 NOTE — PHYSICAL EXAM
[Normal Appearance] : normal appearance [General Appearance - Well Developed] : well developed [Well Groomed] : well groomed [General Appearance - Well Nourished] : well nourished [No Deformities] : no deformities [General Appearance - In No Acute Distress] : no acute distress [Normal Conjunctiva] : the conjunctiva exhibited no abnormalities [Eyelids - No Xanthelasma] : the eyelids demonstrated no xanthelasmas [Normal Oral Mucosa] : normal oral mucosa [No Oral Cyanosis] : no oral cyanosis [No Oral Pallor] : no oral pallor [Normal Jugular Venous V Waves Present] : normal jugular venous V waves present [Normal Jugular Venous A Waves Present] : normal jugular venous A waves present [No Jugular Venous Portillo A Waves] : no jugular venous portillo A waves [Exaggerated Use Of Accessory Muscles For Inspiration] : no accessory muscle use [Auscultation Breath Sounds / Voice Sounds] : lungs were clear to auscultation bilaterally [Respiration, Rhythm And Depth] : normal respiratory rhythm and effort [Heart Rate And Rhythm] : heart rate and rhythm were normal [Heart Sounds] : normal S1 and S2 [Arterial Pulses Normal] : the arterial pulses were normal [Systolic grade ___/6] : A grade [unfilled]/6 systolic murmur was heard. [Edema] : no peripheral edema present [Veins - Varicosity Changes] : no varicosital changes were noted in the lower extremities [Abdomen Soft] : soft [FreeTextEntry1] : ESM GR 3 [Abdomen Tenderness] : non-tender [Abnormal Walk] : normal gait [Abdomen Mass (___ Cm)] : no abdominal mass palpated [Gait - Sufficient For Exercise Testing] : the gait was sufficient for exercise testing [Nail Clubbing] : no clubbing of the fingernails [Cyanosis, Localized] : no localized cyanosis [Petechial Hemorrhages (___cm)] : no petechial hemorrhages [Skin Color & Pigmentation] : normal skin color and pigmentation [Skin Lesions] : no skin lesions [] : no rash [No Venous Stasis] : no venous stasis [No Skin Ulcers] : no skin ulcer [No Xanthoma] : no  xanthoma was observed [Affect] : the affect was normal [Oriented To Time, Place, And Person] : oriented to person, place, and time [Mood] : the mood was normal [No Anxiety] : not feeling anxious

## 2019-05-06 NOTE — DISCUSSION/SUMMARY
[Mild Aortic Stenosis] : mild aortic stenosis [Echocardiogram] : echocardiogram [None] : none [Coronary Artery Disease] : coronary artery disease [Stable] : stable [Family] : the patient's family [Dietary Modification] : dietary modification [Exercise Regimen] : an exercise regimen [Weight Reduction] : weight reduction [Hyperlipidemia] : hyperlipidemia [Continue] : continuing statins [Improving] : improving [Diet Modification] : diet modification [Exercise] : exercise [<70] : goal is <70 [<150] : goal is <150 [Weight Loss] : weight loss [>40] : goal is >40 [Patient] : the patient [FreeTextEntry1] : \par \par

## 2019-05-06 NOTE — ASSESSMENT
[FreeTextEntry1] : Patient with above history\par \par \par History of old MI and, LAD multiple stents, last one 2005, improved ejection fraction without active cardiac symptoms. Continue his current medications.\par \par Hyperlipidemia: Mildly elevated triglycerides noncompliant to diet, LDL 54. Continue dietary restriction and medication. Monitor lipid profile and liver function tests\par \par Hypertension: controlled as patient is not very compliant to low-salt diet, advise the patient to follow low-salt diet and home blood pressure monitoring.\par \par \par Hypothyroidism: Mildly elevated TSH, normal T3-T4 continue current dose of Synthroid. Continue monitor TSH levels.\par \par Mild to moderate  aortic stenosis: Continuing monitor severity of aortic stenosis. \par \par overall patient is optimal for proposed surgery , patient is intermediate risk  for low risk surgery , \par hold plavix  7 days prior to surgery ,

## 2019-05-06 NOTE — REVIEW OF SYSTEMS
[Feeling Fatigued] : not feeling fatigued [Shortness Of Breath] : no shortness of breath [Chest Pain] : no chest pain [Palpitations] : no palpitations [Abdominal Pain] : no abdominal pain [Urinary Frequency] : no change in urinary frequency [Dysphagia] : no dysphagia [Heartburn] : no heartburn [Negative] : Endocrine

## 2019-05-09 ENCOUNTER — TRANSCRIPTION ENCOUNTER (OUTPATIENT)
Age: 74
End: 2019-05-09

## 2019-05-10 ENCOUNTER — APPOINTMENT (OUTPATIENT)
Dept: ORTHOPEDIC SURGERY | Facility: HOSPITAL | Age: 74
End: 2019-05-10

## 2019-05-10 ENCOUNTER — OUTPATIENT (OUTPATIENT)
Dept: OUTPATIENT SERVICES | Facility: HOSPITAL | Age: 74
LOS: 1 days | End: 2019-05-10
Payer: MEDICARE

## 2019-05-10 VITALS
WEIGHT: 152.78 LBS | DIASTOLIC BLOOD PRESSURE: 53 MMHG | HEIGHT: 64 IN | TEMPERATURE: 97 F | RESPIRATION RATE: 16 BRPM | SYSTOLIC BLOOD PRESSURE: 140 MMHG | OXYGEN SATURATION: 99 % | HEART RATE: 54 BPM

## 2019-05-10 VITALS
SYSTOLIC BLOOD PRESSURE: 134 MMHG | RESPIRATION RATE: 15 BRPM | HEART RATE: 51 BPM | OXYGEN SATURATION: 99 % | DIASTOLIC BLOOD PRESSURE: 43 MMHG

## 2019-05-10 DIAGNOSIS — Z98.890 OTHER SPECIFIED POSTPROCEDURAL STATES: Chronic | ICD-10-CM

## 2019-05-10 DIAGNOSIS — Z98.49 CATARACT EXTRACTION STATUS, UNSPECIFIED EYE: Chronic | ICD-10-CM

## 2019-05-10 DIAGNOSIS — G56.01 CARPAL TUNNEL SYNDROME, RIGHT UPPER LIMB: ICD-10-CM

## 2019-05-10 PROCEDURE — 64721 CARPAL TUNNEL SURGERY: CPT | Mod: RT

## 2019-05-10 PROCEDURE — 20605 DRAIN/INJ JOINT/BURSA W/O US: CPT | Mod: 59,RT

## 2019-05-10 RX ORDER — CEFAZOLIN SODIUM 1 G
2000 VIAL (EA) INJECTION ONCE
Refills: 0 | Status: COMPLETED | OUTPATIENT
Start: 2019-05-10 | End: 2019-05-10

## 2019-05-10 RX ORDER — OXYCODONE AND ACETAMINOPHEN 5; 325 MG/1; MG/1
1 TABLET ORAL ONCE
Refills: 0 | Status: DISCONTINUED | OUTPATIENT
Start: 2019-05-10 | End: 2019-05-10

## 2019-05-10 RX ORDER — SODIUM CHLORIDE 9 MG/ML
1000 INJECTION, SOLUTION INTRAVENOUS
Refills: 0 | Status: DISCONTINUED | OUTPATIENT
Start: 2019-05-10 | End: 2019-05-10

## 2019-05-10 RX ORDER — CHLORHEXIDINE GLUCONATE 213 G/1000ML
1 SOLUTION TOPICAL ONCE
Refills: 0 | Status: COMPLETED | OUTPATIENT
Start: 2019-05-10 | End: 2019-05-10

## 2019-05-10 RX ORDER — HYDROMORPHONE HYDROCHLORIDE 2 MG/ML
0.5 INJECTION INTRAMUSCULAR; INTRAVENOUS; SUBCUTANEOUS
Refills: 0 | Status: DISCONTINUED | OUTPATIENT
Start: 2019-05-10 | End: 2019-05-10

## 2019-05-10 RX ORDER — OXYCODONE AND ACETAMINOPHEN 5; 325 MG/1; MG/1
2 TABLET ORAL ONCE
Refills: 0 | Status: DISCONTINUED | OUTPATIENT
Start: 2019-05-10 | End: 2019-05-10

## 2019-05-10 RX ORDER — IBUPROFEN 200 MG
1 TABLET ORAL
Qty: 30 | Refills: 0
Start: 2019-05-10

## 2019-05-10 RX ADMIN — SODIUM CHLORIDE 50 MILLILITER(S): 9 INJECTION, SOLUTION INTRAVENOUS at 08:16

## 2019-05-10 RX ADMIN — CHLORHEXIDINE GLUCONATE 1 APPLICATION(S): 213 SOLUTION TOPICAL at 06:54

## 2019-05-10 RX ADMIN — SODIUM CHLORIDE 75 MILLILITER(S): 9 INJECTION, SOLUTION INTRAVENOUS at 06:53

## 2019-05-10 NOTE — ASU DISCHARGE PLAN (ADULT/PEDIATRIC) - CARE PROVIDER_API CALL
Bharat Mann)  Orthopaedic Surgery  [address]  [place] [address]  Phone: [phone]  Fax: [phone]  Follow Up Time:

## 2019-05-10 NOTE — ASU PATIENT PROFILE, ADULT - PMH
CAD (coronary artery disease)    Hyperlipidemia    Hypertension    Hypothyroidism    Myocardial infarction  2004 with 4 stents

## 2019-05-10 NOTE — ASU PREOP CHECKLIST - ALLERGIES REVIEWED
Gastroenterology Progress Note     Author: Wm Cottrell   Date & Time Created: 3/22/2017 12:32 PM    Interval History:  S/p manometry--> shows hypertensive LES with failure of LES to relax with swallowing consistent with achalasia     Review of Systems:  Review of Systems   Gastrointestinal: Negative.        Physical Exam:  Physical Exam   Cardiovascular: Normal rate.    Pulmonary/Chest: Effort normal.   Abdominal: Soft. Bowel sounds are normal.       Labs:        Invalid input(s): [id number]  Recent Labs      17   BNPBTYPENAT  434*     Recent Labs      17   1153  17   SODIUM  131*   --   131*  131*   POTASSIUM  2.8*  3.9  3.4*  3.6   CHLORIDE  95*   --   97  96   CO2  27   --   25  24   BUN  18   --   16  19   CREATININE  0.88   --   0.93  0.91   MAGNESIUM   --   1.9   --   2.0   PHOSPHORUS   --    --    --   3.7   CALCIUM  8.5   --   8.6  8.9     Recent Labs      17   ALTSGPT  45  96*  90*   ASTSGOT  66*  100*  98*   ALKPHOSPHAT  86  124*  140*   TBILIRUBIN  0.6  0.7  0.7   PREALBUMIN  6.0*   --    --    GLUCOSE  147*  109*  111*     Recent Labs      17   RBC  3.49*  3.66*   --    HEMOGLOBIN  11.7*  12.5*   --    HEMATOCRIT  35.1*  37.2*   --    PLATELETCT  230  272   --    PROTHROMBTM   --    --   17.6*   INR   --    --   1.40*     Recent Labs      17   WBC  9.6   --   10.5   --    NEUTSPOLYS  77.30*   --   76.90*   --    LYMPHOCYTES  12.80*   --   13.60*   --    MONOCYTES  9.10   --   8.40   --    EOSINOPHILS  0.30   --   0.20   --    BASOPHILS  0.10   --   0.30   --    ASTSGOT   --   66*  100*  98*   ALTSGPT   --   45  96*  90*   ALKPHOSPHAT   --   86  124*  140*   TBILIRUBIN   --   0.6  0.7  0.7     Hemodynamics:  Temp (24hrs), Av.5 °C (97.7 °F), Min:36.2 °C (97.2 °F), Max:36.7  °C (98.1 °F)  Temperature: 36.6 °C (97.8 °F)  Pulse  Av  Min: 66  Max: 114  Blood Pressure : 107/64 mmHg     Respiratory:    Respiration: 18, Pulse Oximetry: 96 %        RUL Breath Sounds: Clear, RML Breath Sounds: Clear, RLL Breath Sounds: Diminished, SOLEDAD Breath Sounds: Clear, LLL Breath Sounds: Diminished  Fluids:    Intake/Output Summary (Last 24 hours) at 17 1232  Last data filed at 17 0800   Gross per 24 hour   Intake      0 ml   Output   1500 ml   Net  -1500 ml     Weight: 74.2 kg (163 lb 9.3 oz)  GI/Nutrition:  Orders Placed This Encounter   Procedures   • DIET NPO     Standing Status: Standing      Number of Occurrences: 1      Standing Expiration Date:      Order Specific Question:  Restrict to:     Answer:  Strict [1]     Medical Decision Making, by Problem:  Active Hospital Problems    Diagnosis   • *Osteomyelitis of toe (CMS-[facility]) [M86.9]   • C. difficile diarrhea [A04.7]-improving    • Bacteremia [R78.81]   • Back pain [M54.9]   • Hyponatremia [E87.1]   • Noncompliance [Z91.19]   • Macrocytosis [D75.89]   • Hypokalemia [E87.6]   • CHF (congestive heart failure) (CMS-HCC) [I50.9]   • Atrial fibrillation (CMS-HCC) [I48.91]   • S/P AAA (abdominal aortic aneurysm) repair [Z98.890, Z86.79]   • Tobacco dependence [F17.200]     Dysphagia-manometry consistent with achalasia   Plan:  -options for treatment of achalasia were reviewed; he is not a good candidate for esophageal myotomy at this time  -balloon dilation carries a 10% risk of perforation which could be life threatening along with his comorbid issues  -therefore injection of botox is recommended for now; this will resolve the symptoms of dysphagia for 6 mo-1 year; eventually he will need one of the above intervention for long term treatment  -chest CT to exclude mass that could cause pseudoachalasia     Core Measures     done

## 2019-05-10 NOTE — BRIEF OPERATIVE NOTE - NSICDXBRIEFPREOP_GEN_ALL_CORE_FT
PRE-OP DIAGNOSIS:  Carpal tunnel syndrome of right wrist 10-May-2019 08:02:45  Evelyn Garza  Carpal tunnel syndrome of right wrist 10-May-2019 08:02:26  Evelyn Garza

## 2019-05-10 NOTE — ASU DISCHARGE PLAN (ADULT/PEDIATRIC) - CALL YOUR DOCTOR IF YOU HAVE ANY OF THE FOLLOWING:
Fever greater than (need to indicate Fahrenheit or Celsius)/Bleeding that does not stop/Wound/Surgical Site with redness, or foul smelling discharge or pus/Pain not relieved by Medications/Swelling that gets worse/Numbness, tingling, color or temperature change to extremity

## 2019-05-20 ENCOUNTER — APPOINTMENT (OUTPATIENT)
Dept: ORTHOPEDIC SURGERY | Facility: CLINIC | Age: 74
End: 2019-05-20
Payer: MEDICARE

## 2019-05-20 PROCEDURE — 99024 POSTOP FOLLOW-UP VISIT: CPT

## 2019-05-20 NOTE — HISTORY OF PRESENT ILLNESS
[de-identified] : s/p right carpal tunnel release on 05/10/2019 [de-identified] : The patient returns for the 1st postoperative visit after undergoing right carpal tunnel release at [facility]. The surgery was on 05/10/2019. He reports mild postoperative pain. The patient is recovering at home. He is scheduled to undergo left CTR this Friday, 05/24/2019.  [de-identified] : Patient is WDWN, alert, and in no acute distress. Breathing is unlabored. He is grossly oriented to person, place, and time. \par \par Incision is healed. There are no signs of infection. Sutures were removed. Edema and tenderness are present.  [de-identified] : No imaging done today.  [de-identified] : Benzoin and steri strips were applied over the incision sites. Massage the scar with vitamin E oil once the strips have fallen off. Gentle range of motion exercises were encouraged. Patient may increase use of the hand, as tolerated.

## 2019-05-20 NOTE — ADDENDUM
[FreeTextEntry1] : I, Destiny Blair wrote this note acting as a scribe for Dr. Bharat Mann on May 20, 2019.

## 2019-05-23 ENCOUNTER — TRANSCRIPTION ENCOUNTER (OUTPATIENT)
Age: 74
End: 2019-05-23

## 2019-05-24 ENCOUNTER — APPOINTMENT (OUTPATIENT)
Dept: ORTHOPEDIC SURGERY | Facility: HOSPITAL | Age: 74
End: 2019-05-24

## 2019-05-24 ENCOUNTER — OUTPATIENT (OUTPATIENT)
Dept: OUTPATIENT SERVICES | Facility: HOSPITAL | Age: 74
LOS: 1 days | End: 2019-05-24
Payer: MEDICARE

## 2019-05-24 VITALS
OXYGEN SATURATION: 100 % | RESPIRATION RATE: 18 BRPM | TEMPERATURE: 98 F | WEIGHT: 153 LBS | HEART RATE: 58 BPM | HEIGHT: 64 IN | SYSTOLIC BLOOD PRESSURE: 149 MMHG | DIASTOLIC BLOOD PRESSURE: 50 MMHG

## 2019-05-24 VITALS
SYSTOLIC BLOOD PRESSURE: 120 MMHG | OXYGEN SATURATION: 99 % | HEART RATE: 50 BPM | RESPIRATION RATE: 16 BRPM | DIASTOLIC BLOOD PRESSURE: 67 MMHG

## 2019-05-24 DIAGNOSIS — Z98.890 OTHER SPECIFIED POSTPROCEDURAL STATES: Chronic | ICD-10-CM

## 2019-05-24 DIAGNOSIS — G56.02 CARPAL TUNNEL SYNDROME, LEFT UPPER LIMB: ICD-10-CM

## 2019-05-24 DIAGNOSIS — Z98.49 CATARACT EXTRACTION STATUS, UNSPECIFIED EYE: Chronic | ICD-10-CM

## 2019-05-24 PROCEDURE — 64721 CARPAL TUNNEL SURGERY: CPT | Mod: LT

## 2019-05-24 PROCEDURE — 20605 DRAIN/INJ JOINT/BURSA W/O US: CPT | Mod: 79,LT

## 2019-05-24 PROCEDURE — 64721 CARPAL TUNNEL SURGERY: CPT | Mod: 79,LT

## 2019-05-24 RX ORDER — OXYCODONE AND ACETAMINOPHEN 5; 325 MG/1; MG/1
1 TABLET ORAL EVERY 4 HOURS
Refills: 0 | Status: DISCONTINUED | OUTPATIENT
Start: 2019-05-24 | End: 2019-05-24

## 2019-05-24 RX ORDER — ONDANSETRON 8 MG/1
4 TABLET, FILM COATED ORAL ONCE
Refills: 0 | Status: DISCONTINUED | OUTPATIENT
Start: 2019-05-24 | End: 2019-05-24

## 2019-05-24 RX ORDER — CHLORHEXIDINE GLUCONATE 213 G/1000ML
1 SOLUTION TOPICAL ONCE
Refills: 0 | Status: COMPLETED | OUTPATIENT
Start: 2019-05-24 | End: 2019-05-24

## 2019-05-24 RX ORDER — SODIUM CHLORIDE 9 MG/ML
1000 INJECTION, SOLUTION INTRAVENOUS
Refills: 0 | Status: DISCONTINUED | OUTPATIENT
Start: 2019-05-24 | End: 2019-05-24

## 2019-05-24 RX ORDER — HYDROMORPHONE HYDROCHLORIDE 2 MG/ML
0.5 INJECTION INTRAMUSCULAR; INTRAVENOUS; SUBCUTANEOUS
Refills: 0 | Status: DISCONTINUED | OUTPATIENT
Start: 2019-05-24 | End: 2019-05-24

## 2019-05-24 RX ORDER — CEFAZOLIN SODIUM 1 G
2000 VIAL (EA) INJECTION ONCE
Refills: 0 | Status: COMPLETED | OUTPATIENT
Start: 2019-05-24 | End: 2019-05-24

## 2019-05-24 RX ADMIN — SODIUM CHLORIDE 100 MILLILITER(S): 9 INJECTION, SOLUTION INTRAVENOUS at 08:45

## 2019-05-24 RX ADMIN — CHLORHEXIDINE GLUCONATE 1 APPLICATION(S): 213 SOLUTION TOPICAL at 06:17

## 2019-05-24 NOTE — ASU DISCHARGE PLAN (ADULT/PEDIATRIC) - CARE PROVIDER_API CALL
Bharat Mann)  Orthopaedic Surgery  [address]  [place] [address]  Phone: [phone]  Fax: [phone]  Follow Up Time:

## 2019-05-24 NOTE — ASU DISCHARGE PLAN (ADULT/PEDIATRIC) - ASU DC SPECIAL INSTRUCTIONSFT
ice 20 minutes on alternating with 20 minutes off for 48 hours post op  mobilize digits 20 times per hour to reduce possible stiffness and /or swelling  keep dressing intact until seen in office

## 2019-05-24 NOTE — ASU DISCHARGE PLAN (ADULT/PEDIATRIC) - CALL YOUR DOCTOR IF YOU HAVE ANY OF THE FOLLOWING:
Wound/Surgical Site with redness, or foul smelling discharge or pus/Bleeding that does not stop/Swelling that gets worse/Numbness, tingling, color or temperature change to extremity/Fever greater than (need to indicate Fahrenheit or Celsius)/Pain not relieved by Medications

## 2019-06-03 ENCOUNTER — APPOINTMENT (OUTPATIENT)
Dept: ORTHOPEDIC SURGERY | Facility: CLINIC | Age: 74
End: 2019-06-03
Payer: MEDICARE

## 2019-06-03 PROCEDURE — 99024 POSTOP FOLLOW-UP VISIT: CPT

## 2019-06-03 NOTE — HISTORY OF PRESENT ILLNESS
[de-identified] : s/p left carpal tunnel release on 05/24/2019.  [de-identified] : The patient returns for the 1st postoperative visit after undergoing left CTR at [facility]. The surgery was on 05/24/2019. He reports mild postoperative pain. The patient is recovering at home. \par Patient is also s/p right CTR on 05/10/2019.  [de-identified] : Patient is WDWN, alert, and in no acute distress. Breathing is unlabored. He is grossly oriented to person, place, and time. \par \par Incision is healed. There are no signs of infection. Sutures were removed. There is a normal amount of postoperative edema and tenderness are present. Sensation is normal.  [de-identified] : No imaging done today.  [de-identified] : Benzoin and steri strips were applied over the incision sites. Massage the scar with vitamin E oil once the strips have fallen off. Gentle range of motion exercises were encouraged. Patient may increase use of the hand, as tolerated. Follow up in 6 weeks.

## 2019-06-03 NOTE — ADDENDUM
[FreeTextEntry1] : I, Destiny Blair wrote this note acting as a scribe for Dr. Bharat Mann on Jun 03, 2019.

## 2019-06-06 ENCOUNTER — MEDICATION RENEWAL (OUTPATIENT)
Age: 74
End: 2019-06-06

## 2019-06-27 ENCOUNTER — APPOINTMENT (OUTPATIENT)
Dept: ORTHOPEDIC SURGERY | Facility: CLINIC | Age: 74
End: 2019-06-27
Payer: MEDICARE

## 2019-06-27 VITALS — BODY MASS INDEX: 26.63 KG/M2 | WEIGHT: 156 LBS | HEIGHT: 64 IN

## 2019-06-27 DIAGNOSIS — G56.03 CARPAL TUNNEL SYNDROM,BILATERAL UPPER LIMBS: ICD-10-CM

## 2019-06-27 PROCEDURE — 99024 POSTOP FOLLOW-UP VISIT: CPT

## 2019-06-27 NOTE — HISTORY OF PRESENT ILLNESS
[de-identified] : s/p left carpal tunnel release on 05/24/2019.  [de-identified] : The patient returns for the 2nd postoperative visit after undergoing left CTR at [facility]. The surgery was on 05/24/2019. He reports mild postoperative pain. The patient is recovering at home. He is overall doing well. \par Patient is also s/p right CTR on 05/10/2019.  [de-identified] : Patient is WDWN, alert, and in no acute distress. Breathing is unlabored. He is grossly oriented to person, place, and time. \par \par Incision is healed. There are no signs of infection. There is a normal amount of postoperative edema and tenderness are present. Sensation is normal.  [de-identified] : No imaging done today.  [de-identified] : Gentle range of motion exercises were encouraged. The patient was advised to soak hand in warm water and Epsom salt. Patient may increase use of the hand, as tolerated. Follow up in 3 months as needed.

## 2019-06-27 NOTE — ADDENDUM
[FreeTextEntry1] : I, Destiny Blair wrote this note acting as a scribe for Dr. Bharat Mann on Jun 27, 2019.

## 2019-08-22 ENCOUNTER — NON-APPOINTMENT (OUTPATIENT)
Age: 74
End: 2019-08-22

## 2019-08-22 ENCOUNTER — APPOINTMENT (OUTPATIENT)
Dept: CARDIOLOGY | Facility: CLINIC | Age: 74
End: 2019-08-22
Payer: MEDICARE

## 2019-08-22 VITALS
SYSTOLIC BLOOD PRESSURE: 143 MMHG | HEIGHT: 64 IN | OXYGEN SATURATION: 98 % | WEIGHT: 159 LBS | HEART RATE: 58 BPM | BODY MASS INDEX: 27.14 KG/M2 | DIASTOLIC BLOOD PRESSURE: 64 MMHG

## 2019-08-22 PROCEDURE — 93000 ELECTROCARDIOGRAM COMPLETE: CPT

## 2019-08-22 PROCEDURE — 99214 OFFICE O/P EST MOD 30 MIN: CPT | Mod: 25

## 2019-08-22 NOTE — DISCUSSION/SUMMARY
[Mild Aortic Stenosis] : mild aortic stenosis [None] : none [Echocardiogram] : echocardiogram [Family] : the patient's family [Stable] : stable [Coronary Artery Disease] : coronary artery disease [Dietary Modification] : dietary modification [Weight Reduction] : weight reduction [Exercise Regimen] : an exercise regimen [Hyperlipidemia] : hyperlipidemia [Improving] : improving [Continue] : continuing statins [Diet Modification] : diet modification [Exercise] : exercise [Weight Loss] : weight loss [<70] : goal is <70 [<150] : goal is <150 [>40] : goal is >40 [Patient] : the patient [FreeTextEntry1] : \par \par

## 2019-08-22 NOTE — REASON FOR VISIT
[Follow-Up - Clinic] : a clinic follow-up of [Abnormal ECG] : an abnormal ECG [Coronary Artery Disease] : coronary artery disease [Hypertension] : hypertension [Hyperlipidemia] : hyperlipidemia [Medication Management] : Medication management [FreeTextEntry1] : \par \par

## 2019-08-22 NOTE — PHYSICAL EXAM
[General Appearance - Well Developed] : well developed [Normal Appearance] : normal appearance [Well Groomed] : well groomed [General Appearance - Well Nourished] : well nourished [No Deformities] : no deformities [General Appearance - In No Acute Distress] : no acute distress [Normal Conjunctiva] : the conjunctiva exhibited no abnormalities [Eyelids - No Xanthelasma] : the eyelids demonstrated no xanthelasmas [Normal Oral Mucosa] : normal oral mucosa [No Oral Pallor] : no oral pallor [Normal Jugular Venous A Waves Present] : normal jugular venous A waves present [No Oral Cyanosis] : no oral cyanosis [Normal Jugular Venous V Waves Present] : normal jugular venous V waves present [No Jugular Venous Portillo A Waves] : no jugular venous portillo A waves [Exaggerated Use Of Accessory Muscles For Inspiration] : no accessory muscle use [Respiration, Rhythm And Depth] : normal respiratory rhythm and effort [Auscultation Breath Sounds / Voice Sounds] : lungs were clear to auscultation bilaterally [Heart Rate And Rhythm] : heart rate and rhythm were normal [Heart Sounds] : normal S1 and S2 [Arterial Pulses Normal] : the arterial pulses were normal [Edema] : no peripheral edema present [Veins - Varicosity Changes] : no varicosital changes were noted in the lower extremities [Systolic grade ___/6] : A grade [unfilled]/6 systolic murmur was heard. [FreeTextEntry1] : ESM GR 3 [Abdomen Soft] : soft [Abdomen Tenderness] : non-tender [Abdomen Mass (___ Cm)] : no abdominal mass palpated [Abnormal Walk] : normal gait [Gait - Sufficient For Exercise Testing] : the gait was sufficient for exercise testing [Nail Clubbing] : no clubbing of the fingernails [Cyanosis, Localized] : no localized cyanosis [Petechial Hemorrhages (___cm)] : no petechial hemorrhages [Skin Color & Pigmentation] : normal skin color and pigmentation [] : no rash [No Venous Stasis] : no venous stasis [Skin Lesions] : no skin lesions [No Skin Ulcers] : no skin ulcer [No Xanthoma] : no  xanthoma was observed [Affect] : the affect was normal [Oriented To Time, Place, And Person] : oriented to person, place, and time [Mood] : the mood was normal [No Anxiety] : not feeling anxious

## 2019-08-22 NOTE — ASSESSMENT
[FreeTextEntry1] : Patient with above history\par \par \par History of old MI and, LAD multiple stents, last one 2005, improved ejection fraction without active cardiac symptoms. Continue his current medications. would discontinue plavix , continue ecotrin forever , \par \par Hyperlipidemia: Mildly elevated triglycerides noncompliant to diet, LDL 54. Continue dietary restriction and medication. Monitor lipid profile and liver function tests\par \par Hypertension:mild elevated  as patient is not very compliant to low-salt diet, advise the patient to follow low-salt diet and home blood pressure monitoring.\par \par \par Hypothyroidism: Mildly elevated TSH, normal T3-T4 continue current dose of Synthroid. Continue monitor TSH levels.\par \par Mild to moderate  aortic stenosis: Continuing monitor severity of aortic stenosis.   will obtain ANNAMARIE \par \par patient will follow up with primary for complete blood work up ,\par \par

## 2019-08-22 NOTE — REVIEW OF SYSTEMS
[Feeling Fatigued] : not feeling fatigued [Chest Pain] : no chest pain [Shortness Of Breath] : no shortness of breath [Palpitations] : no palpitations [Abdominal Pain] : no abdominal pain [Dysphagia] : no dysphagia [Heartburn] : no heartburn [Urinary Frequency] : no change in urinary frequency [Negative] : Endocrine

## 2019-08-22 NOTE — HISTORY OF PRESENT ILLNESS
[FreeTextEntry1] : Patient with history of hypertension and hyperlipidemia, coronary artery disease status post PCI stent 2004,   Prior MI ,  hypothyroid came for follow up \par \par  denies any chest pain or shortness of breath or dizziness ,  patient exercise regularly , 5 miles a day without chest pain \par \par Patient  does chronic cervical spine pain with restricted motion , recommended surgery , how ever he does not want it , patient did have epidural injection for lower chronic pain which made it better , \par \par Patient blood work  showed  normal renal function , \par \par patient blood pressure is mild elevated bp ? non compliant to diet , home bp is normal as per patient \par

## 2019-09-04 ENCOUNTER — APPOINTMENT (OUTPATIENT)
Dept: INTERNAL MEDICINE | Facility: CLINIC | Age: 74
End: 2019-09-04
Payer: MEDICARE

## 2019-09-04 VITALS
BODY MASS INDEX: 26.8 KG/M2 | HEIGHT: 64 IN | HEART RATE: 65 BPM | DIASTOLIC BLOOD PRESSURE: 60 MMHG | SYSTOLIC BLOOD PRESSURE: 140 MMHG | OXYGEN SATURATION: 98 % | RESPIRATION RATE: 14 BRPM | WEIGHT: 157 LBS

## 2019-09-04 VITALS — SYSTOLIC BLOOD PRESSURE: 118 MMHG | DIASTOLIC BLOOD PRESSURE: 70 MMHG

## 2019-09-04 DIAGNOSIS — R09.82 POSTNASAL DRIP: ICD-10-CM

## 2019-09-04 DIAGNOSIS — Z23 ENCOUNTER FOR IMMUNIZATION: ICD-10-CM

## 2019-09-04 PROCEDURE — 90471 IMMUNIZATION ADMIN: CPT | Mod: GY

## 2019-09-04 PROCEDURE — 99214 OFFICE O/P EST MOD 30 MIN: CPT | Mod: 25

## 2019-09-04 PROCEDURE — 90750 HZV VACC RECOMBINANT IM: CPT | Mod: GY

## 2019-09-04 NOTE — HISTORY OF PRESENT ILLNESS
[de-identified] : Pt here today for follow up.\par Pt with history of HTN and hyperlipidemia, CAD, PRIOR MI and hypothyroidism\par Denies CP, SOB and palpitations. \par Complains of cough and PN. Worse outside

## 2019-09-04 NOTE — ASSESSMENT
[FreeTextEntry1] : Cough - 2nd to PN- start Flonase Ns- return if worse or persist\par Hyperlipidemia- check labs\par HTN- repeat better\par CAD- follow up with cards

## 2019-09-10 LAB
25(OH)D3 SERPL-MCNC: 24.5 NG/ML
ALBUMIN SERPL ELPH-MCNC: 4.8 G/DL
ALP BLD-CCNC: 61 U/L
ALT SERPL-CCNC: 27 U/L
ANION GAP SERPL CALC-SCNC: 15 MMOL/L
APPEARANCE: CLEAR
AST SERPL-CCNC: 25 U/L
BACTERIA: NEGATIVE
BASOPHILS # BLD AUTO: 0.04 K/UL
BASOPHILS NFR BLD AUTO: 0.6 %
BILIRUB SERPL-MCNC: 0.7 MG/DL
BILIRUBIN URINE: NEGATIVE
BLOOD URINE: ABNORMAL
BUN SERPL-MCNC: 15 MG/DL
CALCIUM SERPL-MCNC: 9.6 MG/DL
CHLORIDE SERPL-SCNC: 103 MMOL/L
CHOLEST SERPL-MCNC: 128 MG/DL
CHOLEST/HDLC SERPL: 2.9 RATIO
CO2 SERPL-SCNC: 25 MMOL/L
COLOR: NORMAL
CREAT SERPL-MCNC: 1.22 MG/DL
EOSINOPHIL # BLD AUTO: 0.25 K/UL
EOSINOPHIL NFR BLD AUTO: 4.1 %
ESTIMATED AVERAGE GLUCOSE: 123 MG/DL
FOLATE SERPL-MCNC: 8.1 NG/ML
GLUCOSE QUALITATIVE U: NEGATIVE
GLUCOSE SERPL-MCNC: 101 MG/DL
HBA1C MFR BLD HPLC: 5.9 %
HCT VFR BLD CALC: 46.9 %
HDLC SERPL-MCNC: 44 MG/DL
HGB BLD-MCNC: 15 G/DL
HYALINE CASTS: 0 /LPF
IMM GRANULOCYTES NFR BLD AUTO: 0.3 %
KETONES URINE: NEGATIVE
LDLC SERPL CALC-MCNC: 58 MG/DL
LEUKOCYTE ESTERASE URINE: NEGATIVE
LYMPHOCYTES # BLD AUTO: 2.11 K/UL
LYMPHOCYTES NFR BLD AUTO: 34.3 %
MAN DIFF?: NORMAL
MCHC RBC-ENTMCNC: 29.2 PG
MCHC RBC-ENTMCNC: 32 GM/DL
MCV RBC AUTO: 91.4 FL
MICROSCOPIC-UA: NORMAL
MONOCYTES # BLD AUTO: 0.68 K/UL
MONOCYTES NFR BLD AUTO: 11 %
NEUTROPHILS # BLD AUTO: 3.06 K/UL
NEUTROPHILS NFR BLD AUTO: 49.7 %
NITRITE URINE: NEGATIVE
PH URINE: 6
PLATELET # BLD AUTO: 142 K/UL
POTASSIUM SERPL-SCNC: 4.8 MMOL/L
PROT SERPL-MCNC: 7.4 G/DL
PROTEIN URINE: NEGATIVE
PSA SERPL-MCNC: 4.22 NG/ML
RBC # BLD: 5.13 M/UL
RBC # FLD: 13.2 %
RED BLOOD CELLS URINE: 1 /HPF
SODIUM SERPL-SCNC: 143 MMOL/L
SPECIFIC GRAVITY URINE: 1.02
SQUAMOUS EPITHELIAL CELLS: 0 /HPF
T4 SERPL-MCNC: 6.7 UG/DL
TRIGL SERPL-MCNC: 129 MG/DL
TSH SERPL-ACNC: 6.36 UIU/ML
UROBILINOGEN URINE: NORMAL
VIT B12 SERPL-MCNC: 340 PG/ML
WBC # FLD AUTO: 6.16 K/UL
WHITE BLOOD CELLS URINE: 1 /HPF

## 2019-09-16 ENCOUNTER — APPOINTMENT (OUTPATIENT)
Dept: CARDIOLOGY | Facility: CLINIC | Age: 74
End: 2019-09-16
Payer: MEDICARE

## 2019-09-16 PROCEDURE — 93922 UPR/L XTREMITY ART 2 LEVELS: CPT

## 2019-09-23 ENCOUNTER — APPOINTMENT (OUTPATIENT)
Dept: UROLOGY | Facility: CLINIC | Age: 74
End: 2019-09-23
Payer: MEDICARE

## 2019-09-23 VITALS
OXYGEN SATURATION: 95 % | SYSTOLIC BLOOD PRESSURE: 135 MMHG | HEART RATE: 56 BPM | HEIGHT: 64 IN | DIASTOLIC BLOOD PRESSURE: 60 MMHG | WEIGHT: 150 LBS | BODY MASS INDEX: 25.61 KG/M2

## 2019-09-23 DIAGNOSIS — N13.8 BENIGN PROSTATIC HYPERPLASIA WITH LOWER URINARY TRACT SYMPMS: ICD-10-CM

## 2019-09-23 DIAGNOSIS — N40.1 BENIGN PROSTATIC HYPERPLASIA WITH LOWER URINARY TRACT SYMPMS: ICD-10-CM

## 2019-09-23 PROCEDURE — 51741 ELECTRO-UROFLOWMETRY FIRST: CPT

## 2019-09-23 PROCEDURE — 51798 US URINE CAPACITY MEASURE: CPT | Mod: 59

## 2019-09-23 PROCEDURE — 99204 OFFICE O/P NEW MOD 45 MIN: CPT | Mod: 25

## 2019-09-23 NOTE — PHYSICAL EXAM
[General Appearance - Well Developed] : well developed [Normal Appearance] : normal appearance [FreeTextEntry1] : normal peripheral circulation  [General Appearance - In No Acute Distress] : no acute distress [Abdomen Soft] : soft [] : no respiratory distress [Abdomen Mass (___ Cm)] : no abdominal mass palpated [Abdomen Tenderness] : non-tender [Costovertebral Angle Tenderness] : no ~M costovertebral angle tenderness [Urethral Meatus] : meatus normal [Penis Abnormality] : normal uncircumcised penis [Scrotum] : the scrotum was normal [Epididymis] : the epididymides were normal [Testes Mass (___cm)] : there were no testicular masses [Testes Tenderness] : no tenderness of the testes [No Prostate Nodules] : no prostate nodules [Prostate Tenderness] : the prostate was not tender [Prostate Size ___ gm] : prostate size [unfilled] gm [Skin Color & Pigmentation] : normal skin color and pigmentation [Normal Station and Gait] : the gait and station were normal for the patient's age [No Focal Deficits] : no focal deficits [Oriented To Time, Place, And Person] : oriented to person, place, and time [No Palpable Adenopathy] : no palpable adenopathy

## 2019-09-23 NOTE — ASSESSMENT
[FreeTextEntry1] : Benign Prostatic Hyperplasia:\par See Uroflo and PVR. \par No bothersome lower urinary tract symptoms and minimal post void residual. \par \par Elevated PSA:\par Discussed with the patient that PSA is a substance produced by the prostate gland. Elevated PSA levels may indicate a noncancerous condition such as prostatitis, or an enlarged prostate but it may also indicate prostate cancer.\par Discussed PSA screening and latest recommendations/guidelines- USPTF and AUA. \par Explained that only way to rule out Prostate cancer in a patient with elevated PSA, increased PSA velocity or abnormal digital rectal exam is to do Prostate needle biopsy.\par Discussed that his age adjusted upper limit of PSA would be 6.5. \par Total and Free PSA next week. Will inform results. \par If stable agreeable to stop checking PSAs. \par  \par

## 2019-09-23 NOTE — HISTORY OF PRESENT ILLNESS
[FreeTextEntry1] : 75 yo male presents for Elevated PSA.\par Recently had PSA checked and was told is elevated. \par Denies any recent unintentional weight loss, night sweats and new bone or back pain.\par Family history of Prostate cancer- no. \par Reports reasonable stream, urinates every few hours or so during the day. No nocturia.\par Denies hesitancy, straining, intermittency, urgency, incontinence, sense of incomplete emptying.\par Denies dysuria, hematuria, lower abdominal or flank pain, fever, chills or rigors.\par Rates erections as 4/5. Reports normal libido. \par

## 2019-10-02 LAB
PSA FREE FLD-MCNC: 26 %
PSA FREE SERPL-MCNC: 1.09 NG/ML
PSA SERPL-MCNC: 4.18 NG/ML

## 2019-10-07 ENCOUNTER — LABORATORY RESULT (OUTPATIENT)
Age: 74
End: 2019-10-07

## 2019-10-07 LAB
T3RU NFR SERPL: 1 TBI
T4 FREE SERPL-MCNC: 1.2 NG/DL
TSH SERPL-ACNC: 4.27 UIU/ML

## 2019-10-08 LAB
BASOPHILS # BLD AUTO: 0.05 K/UL
BASOPHILS NFR BLD AUTO: 1 %
EOSINOPHIL # BLD AUTO: 0.11 K/UL
EOSINOPHIL NFR BLD AUTO: 2.2 %
HCT VFR BLD CALC: 48 %
HGB BLD-MCNC: 15.4 G/DL
IMM GRANULOCYTES NFR BLD AUTO: 0.2 %
LYMPHOCYTES # BLD AUTO: 1.97 K/UL
LYMPHOCYTES NFR BLD AUTO: 38.7 %
MAN DIFF?: NORMAL
MCHC RBC-ENTMCNC: 29.2 PG
MCHC RBC-ENTMCNC: 32.1 GM/DL
MCV RBC AUTO: 91.1 FL
MONOCYTES # BLD AUTO: 0.47 K/UL
MONOCYTES NFR BLD AUTO: 9.2 %
NEUTROPHILS # BLD AUTO: 2.48 K/UL
NEUTROPHILS NFR BLD AUTO: 48.7 %
PLATELET # BLD AUTO: 121 K/UL
RBC # BLD: 5.27 M/UL
RBC # FLD: 13.3 %
WBC # FLD AUTO: 5.09 K/UL

## 2019-10-21 ENCOUNTER — MEDICATION RENEWAL (OUTPATIENT)
Age: 74
End: 2019-10-21

## 2019-10-21 ENCOUNTER — RX RENEWAL (OUTPATIENT)
Age: 74
End: 2019-10-21

## 2019-11-05 ENCOUNTER — APPOINTMENT (OUTPATIENT)
Dept: INTERNAL MEDICINE | Facility: CLINIC | Age: 74
End: 2019-11-05
Payer: MEDICARE

## 2019-11-05 DIAGNOSIS — Z23 ENCOUNTER FOR IMMUNIZATION: ICD-10-CM

## 2019-11-05 PROCEDURE — 90662 IIV NO PRSV INCREASED AG IM: CPT

## 2019-11-05 PROCEDURE — G0008: CPT

## 2019-11-19 ENCOUNTER — APPOINTMENT (OUTPATIENT)
Dept: INTERNAL MEDICINE | Facility: CLINIC | Age: 74
End: 2019-11-19
Payer: MEDICARE

## 2019-11-19 VITALS
BODY MASS INDEX: 27.14 KG/M2 | DIASTOLIC BLOOD PRESSURE: 80 MMHG | WEIGHT: 159 LBS | SYSTOLIC BLOOD PRESSURE: 150 MMHG | RESPIRATION RATE: 14 BRPM | TEMPERATURE: 97.5 F | HEIGHT: 64 IN | OXYGEN SATURATION: 97 % | HEART RATE: 68 BPM

## 2019-11-19 VITALS — SYSTOLIC BLOOD PRESSURE: 130 MMHG | DIASTOLIC BLOOD PRESSURE: 78 MMHG

## 2019-11-19 PROCEDURE — 90471 IMMUNIZATION ADMIN: CPT | Mod: GY

## 2019-11-19 PROCEDURE — 99214 OFFICE O/P EST MOD 30 MIN: CPT | Mod: 25

## 2019-11-19 PROCEDURE — 90750 HZV VACC RECOMBINANT IM: CPT | Mod: GY

## 2019-11-19 NOTE — PHYSICAL EXAM
[Well Nourished] : well nourished [No Acute Distress] : no acute distress [Well Developed] : well developed [Well-Appearing] : well-appearing [PERRL] : pupils equal round and reactive to light [Normal Sclera/Conjunctiva] : normal sclera/conjunctiva [Normal Outer Ear/Nose] : the outer ears and nose were normal in appearance [EOMI] : extraocular movements intact [No JVD] : no jugular venous distention [Normal Oropharynx] : the oropharynx was normal [No Lymphadenopathy] : no lymphadenopathy [Supple] : supple [Thyroid Normal, No Nodules] : the thyroid was normal and there were no nodules present [No Respiratory Distress] : no respiratory distress  [No Accessory Muscle Use] : no accessory muscle use [Clear to Auscultation] : lungs were clear to auscultation bilaterally [Normal Rate] : normal rate  [Regular Rhythm] : with a regular rhythm [Normal S1, S2] : normal S1 and S2 [No Murmur] : no murmur heard [No Abdominal Bruit] : a ~M bruit was not heard ~T in the abdomen [No Carotid Bruits] : no carotid bruits [Pedal Pulses Present] : the pedal pulses are present [No Varicosities] : no varicosities [No Edema] : there was no peripheral edema [No Palpable Aorta] : no palpable aorta [No Extremity Clubbing/Cyanosis] : no extremity clubbing/cyanosis [Soft] : abdomen soft [Non Tender] : non-tender [Non-distended] : non-distended [No Masses] : no abdominal mass palpated [No HSM] : no HSM [Normal Bowel Sounds] : normal bowel sounds [Normal Anterior Cervical Nodes] : no anterior cervical lymphadenopathy [Normal Posterior Cervical Nodes] : no posterior cervical lymphadenopathy [No CVA Tenderness] : no CVA  tenderness [No Spinal Tenderness] : no spinal tenderness [No Joint Swelling] : no joint swelling [Grossly Normal Strength/Tone] : grossly normal strength/tone [Coordination Grossly Intact] : coordination grossly intact [No Rash] : no rash [No Focal Deficits] : no focal deficits [Normal Gait] : normal gait [Deep Tendon Reflexes (DTR)] : deep tendon reflexes were 2+ and symmetric [Normal Affect] : the affect was normal [Normal Insight/Judgement] : insight and judgment were intact

## 2019-11-19 NOTE — ASSESSMENT
[FreeTextEntry1] : HTN- repeat better\par Hyperlipidemia- stable\par Hypothyroidism- labs check\par Elevated PSA- follow up with urology

## 2019-11-21 ENCOUNTER — APPOINTMENT (OUTPATIENT)
Dept: CARDIOLOGY | Facility: CLINIC | Age: 74
End: 2019-11-21
Payer: MEDICARE

## 2019-11-21 ENCOUNTER — NON-APPOINTMENT (OUTPATIENT)
Age: 74
End: 2019-11-21

## 2019-11-21 VITALS
WEIGHT: 156 LBS | HEART RATE: 60 BPM | OXYGEN SATURATION: 98 % | BODY MASS INDEX: 26.63 KG/M2 | HEIGHT: 64 IN | DIASTOLIC BLOOD PRESSURE: 70 MMHG | SYSTOLIC BLOOD PRESSURE: 130 MMHG

## 2019-11-21 PROCEDURE — 93000 ELECTROCARDIOGRAM COMPLETE: CPT

## 2019-11-21 PROCEDURE — 99214 OFFICE O/P EST MOD 30 MIN: CPT

## 2019-11-21 NOTE — HISTORY OF PRESENT ILLNESS
[FreeTextEntry1] : Patient with history of hypertension and hyperlipidemia, coronary artery disease status post PCI stent 2004,   Prior MI ,  hypothyroid came for follow up , went on vacation walked a lot without any issues \par \par  denies any chest pain or shortness of breath or dizziness ,  patient exercise regularly , 5 miles a day without chest pain \par \par Patient  does  have chronic cervical spine pain with restricted motion , recommended surgery , how ever he does not want it , patient did have epidural injection for lower chronic pain which made it better , \par \par Patient blood work  showed  normal renal function , \par \par patient blood pressure is controlled  home bp is normal as per patient  lipid profile  lDL 58  ANNAMARIE is normal range \par

## 2019-11-21 NOTE — ASSESSMENT
[FreeTextEntry1] : Patient with above history\par \par \par History of old MI and, LAD multiple stents, last one 2005, improved ejection fraction without active cardiac symptoms. Continue his current medications. continue ecotrin forever , \par \par Hyperlipidemia: Mildly elevated triglycerides noncompliant to diet, LDL 58. Continue dietary restriction and medication. Monitor lipid profile and liver function tests\par \par Hypertension: controlled  advise the patient to follow low-salt diet and home blood pressure monitoring.\par \par \par Hypothyroidism: Mildly elevated TSH, normal T3-T4 continue current dose of Synthroid. Continue monitor TSH levels.\par \par Mild to moderate  aortic stenosis: Continuing monitor severity of aortic stenosis.  \par \par patient will follow up with primary for complete blood work up ,\par \par

## 2019-11-21 NOTE — PHYSICAL EXAM
[General Appearance - Well Developed] : well developed [Normal Appearance] : normal appearance [Well Groomed] : well groomed [General Appearance - Well Nourished] : well nourished [No Deformities] : no deformities [General Appearance - In No Acute Distress] : no acute distress [Normal Conjunctiva] : the conjunctiva exhibited no abnormalities [Eyelids - No Xanthelasma] : the eyelids demonstrated no xanthelasmas [Normal Oral Mucosa] : normal oral mucosa [No Oral Pallor] : no oral pallor [No Oral Cyanosis] : no oral cyanosis [Normal Jugular Venous A Waves Present] : normal jugular venous A waves present [Normal Jugular Venous V Waves Present] : normal jugular venous V waves present [No Jugular Venous Portillo A Waves] : no jugular venous portillo A waves [Respiration, Rhythm And Depth] : normal respiratory rhythm and effort [Exaggerated Use Of Accessory Muscles For Inspiration] : no accessory muscle use [Auscultation Breath Sounds / Voice Sounds] : lungs were clear to auscultation bilaterally [Heart Rate And Rhythm] : heart rate and rhythm were normal [Heart Sounds] : normal S1 and S2 [Arterial Pulses Normal] : the arterial pulses were normal [Edema] : no peripheral edema present [Veins - Varicosity Changes] : no varicosital changes were noted in the lower extremities [Systolic grade ___/6] : A grade [unfilled]/6 systolic murmur was heard. [FreeTextEntry1] : ESM GR 3 [Abdomen Soft] : soft [Abdomen Tenderness] : non-tender [Abdomen Mass (___ Cm)] : no abdominal mass palpated [Abnormal Walk] : normal gait [Gait - Sufficient For Exercise Testing] : the gait was sufficient for exercise testing [Nail Clubbing] : no clubbing of the fingernails [Cyanosis, Localized] : no localized cyanosis [Petechial Hemorrhages (___cm)] : no petechial hemorrhages [Skin Color & Pigmentation] : normal skin color and pigmentation [] : no rash [No Venous Stasis] : no venous stasis [Skin Lesions] : no skin lesions [No Skin Ulcers] : no skin ulcer [No Xanthoma] : no  xanthoma was observed [Oriented To Time, Place, And Person] : oriented to person, place, and time [Affect] : the affect was normal [Mood] : the mood was normal [No Anxiety] : not feeling anxious

## 2019-11-21 NOTE — CARDIOLOGY SUMMARY
[___] : [unfilled] [LVEF ___%] : LVEF [unfilled]% [None] : no pulmonary hypertension [Normal] : normal LA size [Mild] : mild mitral regurgitation [___] : [unfilled]

## 2019-11-21 NOTE — REVIEW OF SYSTEMS
[Feeling Fatigued] : not feeling fatigued [Shortness Of Breath] : no shortness of breath [Chest Pain] : no chest pain [Palpitations] : no palpitations [Abdominal Pain] : no abdominal pain [Heartburn] : no heartburn [Dysphagia] : no dysphagia [Urinary Frequency] : no change in urinary frequency [Negative] : Endocrine

## 2019-12-11 ENCOUNTER — RX RENEWAL (OUTPATIENT)
Age: 74
End: 2019-12-11

## 2020-03-26 ENCOUNTER — APPOINTMENT (OUTPATIENT)
Dept: CARDIOLOGY | Facility: CLINIC | Age: 75
End: 2020-03-26

## 2020-04-10 ENCOUNTER — RX RENEWAL (OUTPATIENT)
Age: 75
End: 2020-04-10

## 2020-05-26 ENCOUNTER — NON-APPOINTMENT (OUTPATIENT)
Age: 75
End: 2020-05-26

## 2020-05-26 ENCOUNTER — APPOINTMENT (OUTPATIENT)
Dept: CARDIOLOGY | Facility: CLINIC | Age: 75
End: 2020-05-26
Payer: MEDICARE

## 2020-05-26 VITALS — DIASTOLIC BLOOD PRESSURE: 74 MMHG | SYSTOLIC BLOOD PRESSURE: 164 MMHG

## 2020-05-26 VITALS
SYSTOLIC BLOOD PRESSURE: 163 MMHG | WEIGHT: 156 LBS | HEART RATE: 63 BPM | BODY MASS INDEX: 26.63 KG/M2 | HEIGHT: 64 IN | DIASTOLIC BLOOD PRESSURE: 69 MMHG | OXYGEN SATURATION: 98 %

## 2020-05-26 DIAGNOSIS — R07.89 OTHER CHEST PAIN: ICD-10-CM

## 2020-05-26 PROCEDURE — 93000 ELECTROCARDIOGRAM COMPLETE: CPT

## 2020-05-26 PROCEDURE — 99214 OFFICE O/P EST MOD 30 MIN: CPT

## 2020-05-26 NOTE — ASSESSMENT
[FreeTextEntry1] : Patient with above history\par \par Atypical chest pain , doubt cardiac , will obtain echo , chemical stress test as patient has back pain \par \par \par History of old MI and, LAD multiple stents, last one 2005, improved ejection fraction without active cardiac symptoms. Continue his current medications. continue ecotrin forever , \par \par Hyperlipidemia: Mildly elevated triglycerides noncompliant to diet, LDL 58. Continue dietary restriction and medication. Monitor lipid profile and liver function tests\par \par Hypertension: uncontrolled  advise the patient to follow low-salt diet and home blood pressure monitoring.  will add HCTZ 12.5 mg po daily \par \par \par Hypothyroidism: Mildly elevated TSH, normal T3-T4 continue current dose of Synthroid. Continue monitor TSH levels.\par \par Mild to moderate  aortic stenosis: Continuing monitor severity of aortic stenosis.  \par \par patient will follow up with primary for complete blood work up ,\par \par

## 2020-05-26 NOTE — REASON FOR VISIT
[Follow-Up - Clinic] : a clinic follow-up of [Abnormal ECG] : an abnormal ECG [Coronary Artery Disease] : coronary artery disease [Chest Pain] : chest pain [Hypertension] : hypertension [Hyperlipidemia] : hyperlipidemia [FreeTextEntry1] : \par \par  [Medication Management] : Medication management

## 2020-05-26 NOTE — HISTORY OF PRESENT ILLNESS
[FreeTextEntry1] : Patient with history of hypertension and hyperlipidemia, coronary artery disease status post PCI stent 2004,   Prior MI ,  hypothyroid came  with complain that he had episode of left sided localized  chest pain ,lasted for 2 minutes , it was sharp finger point size , not related to exertion , no recurrence since than \par \par  denies any chest pain or shortness of breath or dizziness ,  patient exercise regularly , \par \par Patient  does  have chronic cervical spine pain with restricted motion , recommended surgery , how ever he does not want it , patient did have epidural injection for lower chronic pain which made it better , \par \par Patient blood work  showed  normal renal function , \par \par patient blood pressure is elevated today \par \par  patient  lipid profile  lDL 58  ANNAMARIE is normal range \par

## 2020-05-26 NOTE — DISCUSSION/SUMMARY
[Mild Aortic Stenosis] : mild aortic stenosis [Echocardiogram] : echocardiogram [None] : none [Coronary Artery Disease] : coronary artery disease [Family] : the patient's family [Stable] : stable [Exercise Regimen] : an exercise regimen [Dietary Modification] : dietary modification [Weight Reduction] : weight reduction [Hyperlipidemia] : hyperlipidemia [Diet Modification] : diet modification [Continue] : continuing statins [Improving] : improving [Weight Loss] : weight loss [Exercise] : exercise [<70] : goal is <70 [<150] : goal is <150 [Patient] : the patient [>40] : goal is >40 [FreeTextEntry1] : \par \par

## 2020-05-26 NOTE — PHYSICAL EXAM
[General Appearance - Well Developed] : well developed [Normal Appearance] : normal appearance [General Appearance - Well Nourished] : well nourished [Well Groomed] : well groomed [No Deformities] : no deformities [Normal Conjunctiva] : the conjunctiva exhibited no abnormalities [General Appearance - In No Acute Distress] : no acute distress [Eyelids - No Xanthelasma] : the eyelids demonstrated no xanthelasmas [No Oral Pallor] : no oral pallor [Normal Oral Mucosa] : normal oral mucosa [Normal Jugular Venous A Waves Present] : normal jugular venous A waves present [No Oral Cyanosis] : no oral cyanosis [Normal Jugular Venous V Waves Present] : normal jugular venous V waves present [No Jugular Venous Portillo A Waves] : no jugular venous portillo A waves [Exaggerated Use Of Accessory Muscles For Inspiration] : no accessory muscle use [Respiration, Rhythm And Depth] : normal respiratory rhythm and effort [Heart Rate And Rhythm] : heart rate and rhythm were normal [Auscultation Breath Sounds / Voice Sounds] : lungs were clear to auscultation bilaterally [Arterial Pulses Normal] : the arterial pulses were normal [Heart Sounds] : normal S1 and S2 [Edema] : no peripheral edema present [Systolic grade ___/6] : A grade [unfilled]/6 systolic murmur was heard. [Veins - Varicosity Changes] : no varicosital changes were noted in the lower extremities [Abdomen Soft] : soft [FreeTextEntry1] : ESM GR 3 [Abdomen Tenderness] : non-tender [Abdomen Mass (___ Cm)] : no abdominal mass palpated [Abnormal Walk] : normal gait [Gait - Sufficient For Exercise Testing] : the gait was sufficient for exercise testing [Nail Clubbing] : no clubbing of the fingernails [Cyanosis, Localized] : no localized cyanosis [Skin Color & Pigmentation] : normal skin color and pigmentation [Petechial Hemorrhages (___cm)] : no petechial hemorrhages [] : no rash [Skin Lesions] : no skin lesions [No Venous Stasis] : no venous stasis [Oriented To Time, Place, And Person] : oriented to person, place, and time [No Xanthoma] : no  xanthoma was observed [No Skin Ulcers] : no skin ulcer [Mood] : the mood was normal [No Anxiety] : not feeling anxious [Affect] : the affect was normal

## 2020-05-26 NOTE — REVIEW OF SYSTEMS
[Shortness Of Breath] : no shortness of breath [Feeling Fatigued] : not feeling fatigued [Palpitations] : no palpitations [Chest Pain] : no chest pain [Abdominal Pain] : no abdominal pain [Heartburn] : no heartburn [Urinary Frequency] : no change in urinary frequency [Dysphagia] : no dysphagia [Negative] : Neurological

## 2020-05-27 ENCOUNTER — APPOINTMENT (OUTPATIENT)
Dept: CARDIOLOGY | Facility: CLINIC | Age: 75
End: 2020-05-27
Payer: MEDICARE

## 2020-05-27 PROCEDURE — 93306 TTE W/DOPPLER COMPLETE: CPT

## 2020-06-06 ENCOUNTER — RX RENEWAL (OUTPATIENT)
Age: 75
End: 2020-06-06

## 2020-06-11 ENCOUNTER — APPOINTMENT (OUTPATIENT)
Dept: CARDIOLOGY | Facility: CLINIC | Age: 75
End: 2020-06-11
Payer: MEDICARE

## 2020-06-11 PROCEDURE — 78452 HT MUSCLE IMAGE SPECT MULT: CPT

## 2020-06-11 PROCEDURE — A9500: CPT

## 2020-06-11 PROCEDURE — 93015 CV STRESS TEST SUPVJ I&R: CPT

## 2020-06-23 ENCOUNTER — APPOINTMENT (OUTPATIENT)
Dept: INTERNAL MEDICINE | Facility: CLINIC | Age: 75
End: 2020-06-23
Payer: MEDICARE

## 2020-06-23 VITALS
HEIGHT: 64 IN | RESPIRATION RATE: 14 BRPM | DIASTOLIC BLOOD PRESSURE: 60 MMHG | TEMPERATURE: 98.4 F | SYSTOLIC BLOOD PRESSURE: 140 MMHG | BODY MASS INDEX: 26.98 KG/M2 | HEART RATE: 70 BPM | WEIGHT: 158 LBS | OXYGEN SATURATION: 97 %

## 2020-06-23 DIAGNOSIS — R97.20 ELEVATED PROSTATE, SPECIFIC ANTIGEN [PSA]: ICD-10-CM

## 2020-06-23 PROCEDURE — G0439: CPT

## 2020-06-23 PROCEDURE — 36415 COLL VENOUS BLD VENIPUNCTURE: CPT

## 2020-06-23 NOTE — ASSESSMENT
[FreeTextEntry1] : HCM- needs colonoscopy ( pt refuses)\par Agrees to prostate biopsy if elevated- check PSA\par check labs\par Hypothyroidism- check thyroids levels\par HTN- good control\par GERD- stable \par Hyperlipidemia- check lipid

## 2020-06-23 NOTE — PHYSICAL EXAM
[No Acute Distress] : no acute distress [Well Nourished] : well nourished [Well Developed] : well developed [Well-Appearing] : well-appearing [Normal Sclera/Conjunctiva] : normal sclera/conjunctiva [PERRL] : pupils equal round and reactive to light [EOMI] : extraocular movements intact [Normal Outer Ear/Nose] : the outer ears and nose were normal in appearance [Normal Oropharynx] : the oropharynx was normal [No JVD] : no jugular venous distention [No Lymphadenopathy] : no lymphadenopathy [Supple] : supple [Thyroid Normal, No Nodules] : the thyroid was normal and there were no nodules present [No Respiratory Distress] : no respiratory distress  [No Accessory Muscle Use] : no accessory muscle use [Clear to Auscultation] : lungs were clear to auscultation bilaterally [Normal Rate] : normal rate  [Regular Rhythm] : with a regular rhythm [Normal S1, S2] : normal S1 and S2 [No Murmur] : no murmur heard [No Carotid Bruits] : no carotid bruits [No Abdominal Bruit] : a ~M bruit was not heard ~T in the abdomen [No Varicosities] : no varicosities [Pedal Pulses Present] : the pedal pulses are present [No Edema] : there was no peripheral edema [No Palpable Aorta] : no palpable aorta [No Extremity Clubbing/Cyanosis] : no extremity clubbing/cyanosis [Soft] : abdomen soft [Non Tender] : non-tender [Non-distended] : non-distended [No Masses] : no abdominal mass palpated [No HSM] : no HSM [Normal Bowel Sounds] : normal bowel sounds [Normal Posterior Cervical Nodes] : no posterior cervical lymphadenopathy [No CVA Tenderness] : no CVA  tenderness [Normal Anterior Cervical Nodes] : no anterior cervical lymphadenopathy [No Joint Swelling] : no joint swelling [No Spinal Tenderness] : no spinal tenderness [Coordination Grossly Intact] : coordination grossly intact [No Rash] : no rash [Grossly Normal Strength/Tone] : grossly normal strength/tone [No Focal Deficits] : no focal deficits [Normal Gait] : normal gait [Normal Insight/Judgement] : insight and judgment were intact [Normal Affect] : the affect was normal [Deep Tendon Reflexes (DTR)] : deep tendon reflexes were 2+ and symmetric

## 2020-06-24 LAB
25(OH)D3 SERPL-MCNC: 23.6 NG/ML
ALBUMIN SERPL ELPH-MCNC: 4.8 G/DL
ALP BLD-CCNC: 57 U/L
ALT SERPL-CCNC: 23 U/L
ANION GAP SERPL CALC-SCNC: 13 MMOL/L
APPEARANCE: CLEAR
AST SERPL-CCNC: 27 U/L
BACTERIA: NEGATIVE
BASOPHILS # BLD AUTO: 0.03 K/UL
BASOPHILS NFR BLD AUTO: 0.6 %
BILIRUB SERPL-MCNC: 0.8 MG/DL
BILIRUBIN URINE: NEGATIVE
BLOOD URINE: NEGATIVE
BUN SERPL-MCNC: 15 MG/DL
CALCIUM SERPL-MCNC: 9.5 MG/DL
CHLORIDE SERPL-SCNC: 103 MMOL/L
CHOLEST SERPL-MCNC: 130 MG/DL
CHOLEST/HDLC SERPL: 3.3 RATIO
CO2 SERPL-SCNC: 27 MMOL/L
COLOR: YELLOW
CREAT SERPL-MCNC: 1.16 MG/DL
EOSINOPHIL # BLD AUTO: 0.11 K/UL
EOSINOPHIL NFR BLD AUTO: 2 %
FOLATE SERPL-MCNC: 7.5 NG/ML
GLUCOSE QUALITATIVE U: NEGATIVE
GLUCOSE SERPL-MCNC: 108 MG/DL
HCT VFR BLD CALC: 45.6 %
HDLC SERPL-MCNC: 39 MG/DL
HGB BLD-MCNC: 14.8 G/DL
HYALINE CASTS: 1 /LPF
IMM GRANULOCYTES NFR BLD AUTO: 0.4 %
KETONES URINE: NEGATIVE
LDLC SERPL CALC-MCNC: 60 MG/DL
LEUKOCYTE ESTERASE URINE: NEGATIVE
LYMPHOCYTES # BLD AUTO: 1.95 K/UL
LYMPHOCYTES NFR BLD AUTO: 35.8 %
MAN DIFF?: NORMAL
MCHC RBC-ENTMCNC: 29.5 PG
MCHC RBC-ENTMCNC: 32.5 GM/DL
MCV RBC AUTO: 90.8 FL
MICROSCOPIC-UA: NORMAL
MONOCYTES # BLD AUTO: 0.52 K/UL
MONOCYTES NFR BLD AUTO: 9.6 %
NEUTROPHILS # BLD AUTO: 2.81 K/UL
NEUTROPHILS NFR BLD AUTO: 51.6 %
NITRITE URINE: NEGATIVE
PH URINE: 6
PLATELET # BLD AUTO: 133 K/UL
POTASSIUM SERPL-SCNC: 4.5 MMOL/L
PROT SERPL-MCNC: 7.7 G/DL
PROTEIN URINE: NEGATIVE
PSA SERPL-MCNC: 3.57 NG/ML
RBC # BLD: 5.02 M/UL
RBC # FLD: 13 %
RED BLOOD CELLS URINE: 2 /HPF
SODIUM SERPL-SCNC: 143 MMOL/L
SPECIFIC GRAVITY URINE: 1.02
SQUAMOUS EPITHELIAL CELLS: 1 /HPF
TRIGL SERPL-MCNC: 154 MG/DL
TSH SERPL-ACNC: 5.16 UIU/ML
UROBILINOGEN URINE: NORMAL
VIT B12 SERPL-MCNC: 332 PG/ML
WBC # FLD AUTO: 5.44 K/UL
WHITE BLOOD CELLS URINE: 1 /HPF

## 2020-07-14 ENCOUNTER — RX RENEWAL (OUTPATIENT)
Age: 75
End: 2020-07-14

## 2020-07-17 ENCOUNTER — RX RENEWAL (OUTPATIENT)
Age: 75
End: 2020-07-17

## 2020-08-24 ENCOUNTER — RX RENEWAL (OUTPATIENT)
Age: 75
End: 2020-08-24

## 2020-09-22 ENCOUNTER — RX RENEWAL (OUTPATIENT)
Age: 75
End: 2020-09-22

## 2020-10-01 ENCOUNTER — NON-APPOINTMENT (OUTPATIENT)
Age: 75
End: 2020-10-01

## 2020-10-01 ENCOUNTER — APPOINTMENT (OUTPATIENT)
Dept: CARDIOLOGY | Facility: CLINIC | Age: 75
End: 2020-10-01
Payer: MEDICARE

## 2020-10-01 VITALS
HEIGHT: 64 IN | BODY MASS INDEX: 26.98 KG/M2 | WEIGHT: 158 LBS | OXYGEN SATURATION: 98 % | DIASTOLIC BLOOD PRESSURE: 66 MMHG | HEART RATE: 50 BPM | SYSTOLIC BLOOD PRESSURE: 145 MMHG

## 2020-10-01 PROCEDURE — 99214 OFFICE O/P EST MOD 30 MIN: CPT

## 2020-10-01 PROCEDURE — 93000 ELECTROCARDIOGRAM COMPLETE: CPT

## 2020-10-01 NOTE — REVIEW OF SYSTEMS
[Negative] : Endocrine [Feeling Fatigued] : not feeling fatigued [Shortness Of Breath] : no shortness of breath [Chest Pain] : no chest pain [Palpitations] : no palpitations [Abdominal Pain] : no abdominal pain [Heartburn] : no heartburn [Dysphagia] : no dysphagia [Urinary Frequency] : no change in urinary frequency

## 2020-10-01 NOTE — ASSESSMENT
[FreeTextEntry1] : Patient with above history\par \par Atypical chest pain , doubt cardiac  resolved , no evidence of ischemia on myocardial perfusion scan with fixed defects related to prior MI , \par \par \par History of old MI and, LAD multiple stents, last one 2005, improved ejection fraction without active cardiac symptoms. Continue his current medications. continue ecotrin forever , \par \par Hyperlipidemia: Mildly elevated triglycerides noncompliant to diet, LDL 58. Continue dietary restriction and medication. Monitor lipid profile and liver function tests\par \par Hypertension: uncontrolled  advise the patient to follow low-salt diet and home blood pressure monitoring.  change medications losartant HCTZ together 100/12.5 mg po daily , will add norvasc 5 mg pod aily ,  follow up after 1 month \par \par \par Hypothyroidism: Mildly elevated TSH, normal T3-T4 continue current dose of Synthroid. Continue monitor TSH levels.\par \par  moderate to severe   aortic stenosis: Continuing monitor severity of aortic stenosis.  \par \par patient will follow up with primary for complete blood work up ,\par \par

## 2020-10-01 NOTE — REASON FOR VISIT
[Follow-Up - Clinic] : a clinic follow-up of [Abnormal ECG] : an abnormal ECG [Chest Pain] : chest pain [Coronary Artery Disease] : coronary artery disease [Hyperlipidemia] : hyperlipidemia [Hypertension] : hypertension [Medication Management] : Medication management [FreeTextEntry1] : \par \par

## 2020-10-01 NOTE — HISTORY OF PRESENT ILLNESS
[FreeTextEntry1] : Patient with history of hypertension and hyperlipidemia, coronary artery disease status post PCI stent 2004,   Prior MI ,  hypothyroid came for follow up says he is doing well , denies any recurrence of chest pain  \par \par  denies  shortness of breath or dizziness ,  patient exercise regularly ,  patient had follow up echo showed mild worsening of valve area , the gradient remain some what same , does have distal anteroseptal hypokinesis due to prior MI \par \par Patient does have occasional GERD symptoms for which he takes PRN medication  m no change in pattern \par \par Patient  does  have chronic cervical spine pain with restricted motion , recommended surgery , how ever he does not want it , patient did have epidural injection for lower chronic pain which made it better , \par \par Patient blood work  showed  normal renal function ,  mild dyslipidemia , LDL at target level \par \par patient blood pressure is elevated today \par \par  patient  lipid profile  lDL 58  ANNAMARIE is normal range \par

## 2020-10-08 ENCOUNTER — APPOINTMENT (OUTPATIENT)
Dept: INTERNAL MEDICINE | Facility: CLINIC | Age: 75
End: 2020-10-08

## 2020-10-15 ENCOUNTER — APPOINTMENT (OUTPATIENT)
Dept: CARDIOLOGY | Facility: CLINIC | Age: 75
End: 2020-10-15
Payer: MEDICARE

## 2020-10-15 VITALS — SYSTOLIC BLOOD PRESSURE: 132 MMHG | DIASTOLIC BLOOD PRESSURE: 64 MMHG

## 2020-10-15 VITALS
BODY MASS INDEX: 26.98 KG/M2 | OXYGEN SATURATION: 99 % | WEIGHT: 158 LBS | SYSTOLIC BLOOD PRESSURE: 147 MMHG | HEIGHT: 64 IN | DIASTOLIC BLOOD PRESSURE: 69 MMHG | HEART RATE: 61 BPM

## 2020-10-15 PROCEDURE — 99214 OFFICE O/P EST MOD 30 MIN: CPT

## 2020-10-15 NOTE — HISTORY OF PRESENT ILLNESS
[FreeTextEntry1] : Patient with history of hypertension and hyperlipidemia, coronary artery disease status post PCI stent 2004,   Prior MI ,  hypothyroid came for follow up says he is doing well ,  His blood pressure is improved after adding norvasc  \par \par \par  denies  shortness of breath or dizziness ,  patient exercise regularly ,  patient had follow up echo showed mild worsening of valve area , the gradient remain some what same , does have distal anteroseptal hypokinesis due to prior MI \par \par Patient does have occasional GERD symptoms for which he takes PRN medication  m no change in pattern \par \par Patient  does  have chronic cervical spine pain with restricted motion , recommended surgery , how ever he does not want it , patient did have epidural injection for lower chronic pain which made it better , \par \par Patient blood work  showed  normal renal function ,  mild dyslipidemia , LDL at target level \par \par patient  lipid profile  lDL 58  ANNAMARIE is normal range \par

## 2020-10-15 NOTE — REVIEW OF SYSTEMS
[Feeling Fatigued] : not feeling fatigued [Chest Pain] : no chest pain [Shortness Of Breath] : no shortness of breath [Abdominal Pain] : no abdominal pain [Palpitations] : no palpitations [Urinary Frequency] : no change in urinary frequency [Heartburn] : no heartburn [Dysphagia] : no dysphagia [Negative] : Endocrine

## 2020-10-15 NOTE — PHYSICAL EXAM
[Well Groomed] : well groomed [Normal Appearance] : normal appearance [General Appearance - Well Developed] : well developed [General Appearance - In No Acute Distress] : no acute distress [General Appearance - Well Nourished] : well nourished [No Deformities] : no deformities [Normal Conjunctiva] : the conjunctiva exhibited no abnormalities [Eyelids - No Xanthelasma] : the eyelids demonstrated no xanthelasmas [Normal Oral Mucosa] : normal oral mucosa [No Oral Pallor] : no oral pallor [No Oral Cyanosis] : no oral cyanosis [Normal Jugular Venous A Waves Present] : normal jugular venous A waves present [Normal Jugular Venous V Waves Present] : normal jugular venous V waves present [No Jugular Venous Portillo A Waves] : no jugular venous portillo A waves [Respiration, Rhythm And Depth] : normal respiratory rhythm and effort [Exaggerated Use Of Accessory Muscles For Inspiration] : no accessory muscle use [Auscultation Breath Sounds / Voice Sounds] : lungs were clear to auscultation bilaterally [Heart Sounds] : normal S1 and S2 [Heart Rate And Rhythm] : heart rate and rhythm were normal [Edema] : no peripheral edema present [Arterial Pulses Normal] : the arterial pulses were normal [Veins - Varicosity Changes] : no varicosital changes were noted in the lower extremities [Systolic grade ___/6] : A grade [unfilled]/6 systolic murmur was heard. [FreeTextEntry1] : ESM GR 3 [Abdomen Soft] : soft [Abdomen Tenderness] : non-tender [Gait - Sufficient For Exercise Testing] : the gait was sufficient for exercise testing [Abdomen Mass (___ Cm)] : no abdominal mass palpated [Abnormal Walk] : normal gait [Cyanosis, Localized] : no localized cyanosis [Nail Clubbing] : no clubbing of the fingernails [Petechial Hemorrhages (___cm)] : no petechial hemorrhages [Skin Color & Pigmentation] : normal skin color and pigmentation [] : no rash [No Venous Stasis] : no venous stasis [Skin Lesions] : no skin lesions [No Skin Ulcers] : no skin ulcer [Oriented To Time, Place, And Person] : oriented to person, place, and time [No Xanthoma] : no  xanthoma was observed [Mood] : the mood was normal [No Anxiety] : not feeling anxious [Affect] : the affect was normal

## 2020-10-15 NOTE — DISCUSSION/SUMMARY
[Mild Aortic Stenosis] : mild aortic stenosis [Echocardiogram] : echocardiogram [None] : none [Coronary Artery Disease] : coronary artery disease [Family] : the patient's family [Stable] : stable [Dietary Modification] : dietary modification [Exercise Regimen] : an exercise regimen [Weight Reduction] : weight reduction [Hyperlipidemia] : hyperlipidemia [Improving] : improving [Continue] : continuing statins [Exercise] : exercise [Diet Modification] : diet modification [<70] : goal is <70 [Weight Loss] : weight loss [<150] : goal is <150 [>40] : goal is >40 [Patient] : the patient [FreeTextEntry1] : \par \par

## 2020-10-15 NOTE — CARDIOLOGY SUMMARY
[LVEF ___%] : LVEF [unfilled]% [___] : [unfilled] [None] : normal LV function [Normal] : normal LA size [Mild] : mild mitral regurgitation

## 2020-10-21 ENCOUNTER — RX RENEWAL (OUTPATIENT)
Age: 75
End: 2020-10-21

## 2020-10-22 ENCOUNTER — APPOINTMENT (OUTPATIENT)
Dept: CARDIOLOGY | Facility: CLINIC | Age: 75
End: 2020-10-22

## 2020-10-23 ENCOUNTER — APPOINTMENT (OUTPATIENT)
Dept: CARDIOLOGY | Facility: CLINIC | Age: 75
End: 2020-10-23

## 2020-10-28 ENCOUNTER — APPOINTMENT (OUTPATIENT)
Dept: ORTHOPEDIC SURGERY | Facility: CLINIC | Age: 75
End: 2020-10-28
Payer: MEDICARE

## 2020-10-28 VITALS
SYSTOLIC BLOOD PRESSURE: 145 MMHG | DIASTOLIC BLOOD PRESSURE: 78 MMHG | TEMPERATURE: 97.6 F | HEART RATE: 58 BPM | HEIGHT: 64 IN | BODY MASS INDEX: 26.98 KG/M2 | WEIGHT: 158 LBS

## 2020-10-28 DIAGNOSIS — M48.02 SPINAL STENOSIS, CERVICAL REGION: ICD-10-CM

## 2020-10-28 PROCEDURE — 72050 X-RAY EXAM NECK SPINE 4/5VWS: CPT

## 2020-10-28 PROCEDURE — 99214 OFFICE O/P EST MOD 30 MIN: CPT

## 2020-10-28 RX ORDER — AMLODIPINE BESYLATE 5 MG/1
5 TABLET ORAL DAILY
Qty: 90 | Refills: 1 | Status: DISCONTINUED | COMMUNITY
Start: 2020-10-01 | End: 2020-10-28

## 2020-10-28 NOTE — PHYSICAL EXAM
[Stooped] : stooped [Slaughter's Sign] : negative Slaughter's sign [UE] : Sensory: Intact in bilateral upper extremities [Bicep] : biceps 2+ and symmetric bilaterally [B.R.] : biceps 2+ and symmetric bilaterally [Tricep] : triceps 2+ and symmetric bilaterally [de-identified] : seen with his daughter\par The pt is awake, alert and oriented to self, place and time, is comfortable and in no acute distress. Gait evaluation reveals a narrow based, non-ataxic, non-antalgic gait. Negative Romberg sign noted. Can heel and toe walk without difficulty. Inspection of the neck, back and upper extremities bilaterally reveals no rashes or ecchymotic lesions. There is no obvious abnormal spinal curvature in the sagittal and coronal planes. No crepitus or instability noted with range of motion of bilateral upper extremities. No joint laxity noted in the upper and lower extremities bilaterally. No atrophy or abnormal movements noted in the upper or lower extremities. No tenderness over the cervical, thoracic, lumbar spine or in the paraspinal, or upper and lower extremity musculature. There is no swelling noted in the upper or lower extremities bilaterally. No cervical lymphadenopathy noted anteriorly.\par Cervical spine range of motion is limited by discomfort.  Forward flexion is 30 degrees, extension is 10, left and right lateral patient is 20 degrees some stiffness at end range of motion cervical spine range of motion of both shoulders. Negative Neer's sign and Hawkin's sign bilaterally. Negative Spurling's sign bilaterally. In the lumbar spine the patient can forward flex to his knees and extend 30 degrees without pain\par Negative Babinski sign and no clonus bilaterally in the upper or lower extremities. [de-identified] : 4 views cervical spine demonstrate uncovertebral hypertrophy.  On the lateral projection ossification of the anterior longitudinal ligament as well as the posterior longitudinal ligament appreciated.  Disc heights appear preserved.  Loss of cervical lordosis with a stooped forward posture.  Large osteophytes at the C5-6 level without ankylosis yet.  No obvious fractures.

## 2020-10-28 NOTE — DISCUSSION/SUMMARY
[Medication Risks Reviewed] : Medication risks reviewed [de-identified] : At this time the patient presents with primarily neck pain and stiffness.  He has likely DISH.  Given previous MRI 2 years ago had revealed possible myelomalacia at the C3-4 level I recommended getting an updated MRI of the cervical spine to ensure that there is no progression of cervical stenosis or myelomalacia.  Prescribed her diclofenac and methocarbamol for symptomatic relief.  Recommended he see a rheumatologist for further management of his condition.  Physical therapy and home exercise program was also provided.  I will see him back in 2 to 3 months to follow-up on the MRI of the cervical spine or prior to that if there is any change in symptoms.\par \par The patient was educated regarding their condition, treatment options as well as prescribed course of treatment. \par Risks and benefits as well as alternatives to the proposed treatment were also provided to the patient \par They were given the opportunity to have all their questions answered to their satisfaction.\par \par Vital signs were reviewed with the patient and the patient was instructed to followup with their primary care provider for further management.\par \par Healthy lifestyle recommendations were also made including a tobacco free lifestyle, proper diet, and weight control.

## 2020-10-28 NOTE — HISTORY OF PRESENT ILLNESS
[Worsening] : worsening [5] : a current pain level of 5/10 [Daily] : ~He/She~ states the symptoms seem to be occuring daily [Rest] : relieved by rest [de-identified] : Patient is here today for re evaluation on his neck pain going on for many years. \par Last evaluation was in 2018 went for physical therapy temporary relief only. Patient complaining a lot of stiffness lately. Had lumbar ALPESH L L4, L5 ALPESH. [de-identified] : turning

## 2020-11-20 ENCOUNTER — RX RENEWAL (OUTPATIENT)
Age: 75
End: 2020-11-20

## 2020-12-18 ENCOUNTER — APPOINTMENT (OUTPATIENT)
Dept: INTERNAL MEDICINE | Facility: CLINIC | Age: 75
End: 2020-12-18

## 2020-12-28 ENCOUNTER — RX RENEWAL (OUTPATIENT)
Age: 75
End: 2020-12-28

## 2021-02-18 ENCOUNTER — APPOINTMENT (OUTPATIENT)
Dept: CARDIOLOGY | Facility: CLINIC | Age: 76
End: 2021-02-18
Payer: MEDICARE

## 2021-02-18 VITALS
WEIGHT: 162 LBS | HEART RATE: 94 BPM | OXYGEN SATURATION: 98 % | SYSTOLIC BLOOD PRESSURE: 157 MMHG | DIASTOLIC BLOOD PRESSURE: 65 MMHG | BODY MASS INDEX: 27.66 KG/M2 | HEIGHT: 64 IN

## 2021-02-18 VITALS — DIASTOLIC BLOOD PRESSURE: 60 MMHG | SYSTOLIC BLOOD PRESSURE: 140 MMHG

## 2021-02-18 PROCEDURE — 99215 OFFICE O/P EST HI 40 MIN: CPT

## 2021-02-18 PROCEDURE — 99072 ADDL SUPL MATRL&STAF TM PHE: CPT

## 2021-02-18 PROCEDURE — 93000 ELECTROCARDIOGRAM COMPLETE: CPT | Mod: NC

## 2021-02-23 ENCOUNTER — APPOINTMENT (OUTPATIENT)
Dept: ORTHOPEDIC SURGERY | Facility: CLINIC | Age: 76
End: 2021-02-23

## 2021-05-15 ENCOUNTER — APPOINTMENT (OUTPATIENT)
Dept: INTERNAL MEDICINE | Facility: CLINIC | Age: 76
End: 2021-05-15
Payer: MEDICARE

## 2021-05-15 ENCOUNTER — RX RENEWAL (OUTPATIENT)
Age: 76
End: 2021-05-15

## 2021-05-15 VITALS
WEIGHT: 160 LBS | OXYGEN SATURATION: 97 % | DIASTOLIC BLOOD PRESSURE: 76 MMHG | HEIGHT: 64 IN | RESPIRATION RATE: 14 BRPM | BODY MASS INDEX: 27.31 KG/M2 | SYSTOLIC BLOOD PRESSURE: 128 MMHG | HEART RATE: 82 BPM | TEMPERATURE: 97.9 F

## 2021-05-15 PROCEDURE — 99214 OFFICE O/P EST MOD 30 MIN: CPT

## 2021-05-15 NOTE — ASSESSMENT
[FreeTextEntry1] : Elevated PSA- followed by urology\par Hyperlipidemia- check lipid panel\par Hypothyroidism- check labs \par HTN- good control

## 2021-05-15 NOTE — HISTORY OF PRESENT ILLNESS
[de-identified] : History of hyperlipidemia, HTN - followed by cardiology\par History of hypothyroidism- stable \par History of GERD- stable on Meds 2005 s/p cardiac stents 04/2019 s/p cardiac stents

## 2021-06-25 ENCOUNTER — APPOINTMENT (OUTPATIENT)
Dept: ORTHOPEDIC SURGERY | Facility: CLINIC | Age: 76
End: 2021-06-25
Payer: MEDICARE

## 2021-06-25 VITALS — DIASTOLIC BLOOD PRESSURE: 79 MMHG | HEART RATE: 65 BPM | SYSTOLIC BLOOD PRESSURE: 159 MMHG | TEMPERATURE: 98.3 F

## 2021-06-25 DIAGNOSIS — M54.9 DORSALGIA, UNSPECIFIED: ICD-10-CM

## 2021-06-25 PROCEDURE — 72170 X-RAY EXAM OF PELVIS: CPT | Mod: 59

## 2021-06-25 PROCEDURE — 72110 X-RAY EXAM L-2 SPINE 4/>VWS: CPT

## 2021-06-25 PROCEDURE — 99214 OFFICE O/P EST MOD 30 MIN: CPT

## 2021-06-25 NOTE — HISTORY OF PRESENT ILLNESS
[___ wks] : [unfilled] week(s) ago [Bending] : worsened by bending [6] : a current pain level of 6/10 [Lifting] : worsened by lifting [Prolonged Sitting] : worsened by prolonged sitting [Prolonged Standing] : worsened by prolonged standing [Walking] : worsened by walking [Physical Therapy] : relieved by physical therapy [Improving] : improving [de-identified] : Patient is here today for low back pain. He fell in February 2021 and had right quadriceps tendon repair. He is having left low back radiating down left leg. States that his pain was more severe about one week ago. Much better now compared to a week ago.\par Pain only left lower back [de-identified] : methocarbamol/ massages

## 2021-06-25 NOTE — DISCUSSION/SUMMARY
[Medication Risks Reviewed] : Medication risks reviewed [de-identified] : The patient has a steppage gait on the right side with some weakness of knee extension.  His history is notable for right quadriceps tendon rupture and subsequent repair in February 2021 at outside facility.  At this time recommended follow-up with his surgeon regarding his questions about his surgery perceived weakness of his right knee.  This altered gait pattern is likely contributing to his back pain as well.  He has diffuse idiopathic skeletal hyperostosis I recommended he see a rheumatologist for further evaluation and manage of this condition.  This was recommended at his last visit but he did not see a rheumatologist at that time.  Physical therapy was prescribed for his recent episode of left-sided low back pain.  Also prescribed him diclofenac and a refill of methocarbamol.  Recommended follow-up in 4 to 6 weeks on as-needed basis.  If his symptoms persist or worsen MRI lumbar spine may be considered at that time.\par \par The patient was educated regarding their condition, treatment options as well as prescribed course of treatment. \par Risks and benefits as well as alternatives to the proposed treatment were also provided to the patient \par They were given the opportunity to have all their questions answered to their satisfaction.\par \par Vital signs were reviewed with the patient and the patient was instructed to followup with their primary care provider for further management.\par \par Healthy lifestyle recommendations were also made including a tobacco free lifestyle, proper diet, and weight control.

## 2021-06-25 NOTE — PHYSICAL EXAM
[Stooped] : stooped [Apractic] : apractic [] : Motor: [NL] : Motor strength of the left lower extremity was normal [___/5] : right ([unfilled]/5) [Motor Strength Lower Extremities] : right (5/5) [LE] : Sensory: Intact in bilateral lower extremities [0] : left patella 0 [1+] : left ankle jerk 1+ [DP] : dorsalis pedis 2+ and symmetric bilaterally [Slaughter's Sign] : negative Slaughter's sign [SLR] : negative straight leg raise [Plantar Reflex Right Only] : absent on the right [Plantar Reflex Left Only] : absent on the left [DTR Reflexes Clonus Of Right Ankle (___ Beats)] : absent on the right [DTR Reflexes Clonus Of Left Ankle (___ Beats)] : absent on the left [de-identified] : seen with his wife\par The pt is awake, alert and oriented to self, place and time, is comfortable and in no acute distress. Gait evaluation reveals a narrow based, non-ataxic, non-antalgic gait. Negative Romberg sign noted. Can heel and toe walk without difficulty. Inspection of the neck, back and upper extremities bilaterally reveals no rashes or ecchymotic lesions. There is no obvious abnormal spinal curvature in the sagittal and coronal planes. No crepitus or instability noted with range of motion of bilateral upper extremities. No joint laxity noted in the upper and lower extremities bilaterally. No atrophy or abnormal movements noted in the upper or lower extremities. No tenderness over the cervical, thoracic, lumbar spine or in the paraspinal, or upper and lower extremity musculature. There is no swelling noted in the upper or lower extremities bilaterally. No cervical lymphadenopathy noted anteriorly.\par Cervical spine range of motion is limited by discomfort.  Forward flexion is 30 degrees, extension is 10, left and right lateral patient is 20 degrees some stiffness at end range of motion cervical spine range of motion of both shoulders. Negative Neer's sign and Hawkin's sign bilaterally. Negative Spurling's sign bilaterally. In the lumbar spine the patient can forward flex to his knees and extend 30 degrees without pain\par Negative Babinski sign and no clonus bilaterally in the upper or lower extremities. [de-identified] : healed right knee incision [de-identified] : 4 views lumbar spine obtained today were compared with x-rays obtained previously on January 17, 2018.  Previously noted left sided lumbar curve is again seen with lateral bridging osteophytes throughout.  On the lateral projection normal lumbar lordosis again seen with anterior osteophytes flowing throughout the lumbar spine again seen.  No obvious acute fractures.  No dynamic instability between flexion-extension though there is suggestion of trace anterolisthesis at L4-5 with progression of degeneration at the L4-5 level when compared with prior x-rays.  No obvious acute fractures.\par \par AP pelvis demonstrates enthesopathy over the iliac crest as well as greater trochanter and lesser trochanter with calcification of the acetabular labrum large osteophytes noted right more than left extending from the superior acetabulum.  No obvious acute fractures.  Joint spaces preserved.

## 2021-06-26 ENCOUNTER — LABORATORY RESULT (OUTPATIENT)
Age: 76
End: 2021-06-26

## 2021-06-28 ENCOUNTER — APPOINTMENT (OUTPATIENT)
Dept: CARDIOLOGY | Facility: CLINIC | Age: 76
End: 2021-06-28
Payer: MEDICARE

## 2021-06-28 ENCOUNTER — NON-APPOINTMENT (OUTPATIENT)
Age: 76
End: 2021-06-28

## 2021-06-28 ENCOUNTER — APPOINTMENT (OUTPATIENT)
Dept: CARDIOLOGY | Facility: CLINIC | Age: 76
End: 2021-06-28

## 2021-06-28 PROCEDURE — 93306 TTE W/DOPPLER COMPLETE: CPT

## 2021-06-30 LAB
ALBUMIN SERPL ELPH-MCNC: 4.9 G/DL
ALP BLD-CCNC: 61 U/L
ALT SERPL-CCNC: 37 U/L
ANION GAP SERPL CALC-SCNC: 14 MMOL/L
AST SERPL-CCNC: 33 U/L
BILIRUB SERPL-MCNC: 0.6 MG/DL
BUN SERPL-MCNC: 18 MG/DL
CALCIUM SERPL-MCNC: 9.8 MG/DL
CHLORIDE SERPL-SCNC: 102 MMOL/L
CHOLEST SERPL-MCNC: 145 MG/DL
CO2 SERPL-SCNC: 25 MMOL/L
CREAT SERPL-MCNC: 1.29 MG/DL
CREAT SPEC-SCNC: 153 MG/DL
CREAT/PROT UR: 0.1 RATIO
GLUCOSE SERPL-MCNC: 105 MG/DL
HDLC SERPL-MCNC: 37 MG/DL
LDLC SERPL CALC-MCNC: 75 MG/DL
NONHDLC SERPL-MCNC: 107 MG/DL
POTASSIUM SERPL-SCNC: 4.4 MMOL/L
PROT SERPL-MCNC: 7.6 G/DL
PROT UR-MCNC: 9 MG/DL
SODIUM SERPL-SCNC: 142 MMOL/L
T3RU NFR SERPL: 1.1 TBI
T4 FREE SERPL-MCNC: 1.1 NG/DL
TRIGL SERPL-MCNC: 162 MG/DL
TSH SERPL-ACNC: 6.44 UIU/ML

## 2021-06-30 NOTE — REASON FOR VISIT
[Arrhythmia/ECG Abnorrmalities] : arrhythmia/ECG abnormalities [Structural Heart and Valve Disease] : structural heart and valve disease [Hyperlipidemia] : hyperlipidemia [Hypertension] : hypertension [Coronary Artery Disease] : coronary artery disease

## 2021-07-01 ENCOUNTER — NON-APPOINTMENT (OUTPATIENT)
Age: 76
End: 2021-07-01

## 2021-07-01 ENCOUNTER — APPOINTMENT (OUTPATIENT)
Dept: CARDIOLOGY | Facility: CLINIC | Age: 76
End: 2021-07-01
Payer: MEDICARE

## 2021-07-01 VITALS
WEIGHT: 162 LBS | SYSTOLIC BLOOD PRESSURE: 140 MMHG | HEIGHT: 64 IN | HEART RATE: 78 BPM | OXYGEN SATURATION: 98 % | BODY MASS INDEX: 27.66 KG/M2 | DIASTOLIC BLOOD PRESSURE: 80 MMHG

## 2021-07-01 VITALS — DIASTOLIC BLOOD PRESSURE: 80 MMHG | SYSTOLIC BLOOD PRESSURE: 130 MMHG

## 2021-07-01 PROCEDURE — 93000 ELECTROCARDIOGRAM COMPLETE: CPT

## 2021-07-01 PROCEDURE — 99214 OFFICE O/P EST MOD 30 MIN: CPT

## 2021-07-01 NOTE — PHYSICAL EXAM

## 2021-07-01 NOTE — CARDIOLOGY SUMMARY
[de-identified] : 7/1/21 sinus rhythm  AS infarct IVCD non specific ST  T changes [de-identified] : 6/11/20  medium size distal anterior , apical MI without ischemia  EF 46% [de-identified] : 6/28/21  EF 54% Mild Dd  moderate AS CORRINE 1.3 Sq cm , PG27 mm hg , MG 16 mm hg , minima AI , mild dilated ascending aorta 4.1  [de-identified] : 3/15/2004 RCA LAD,2/14/2005 LAD LCX

## 2021-07-01 NOTE — HISTORY OF PRESENT ILLNESS
[FreeTextEntry1] : Patient with history of hypertension and hyperlipidemia, coronary artery disease status post PCI stent 2004,   Prior MI ,  hypothyroid  came for follow up ,  patient had surgery recovering well , patient had follow up  echo , showed moderate AS without worsening , patient denies any active cardiac symptoms \par \par  denies  shortness of breath or dizziness ,  patient exercise regularly ,  patient had follow up echo the gradient remain some what same , does have distal anteroseptal hypokinesis due to prior MI \par \par Patient does have occasional GERD symptoms for which he takes PRN medication  m no change in pattern \par \par Patient  does  have chronic cervical spine pain with restricted motion , recommended surgery , how ever he does not want it , patient did have epidural injection for lower back for chronic pain which made it better , \par \par Patient blood work  showed  normal renal function ,  mild dyslipidemia ,  low HDL  37 LDL at target level \par \par TSH elevated normal T3 T4 level \par \par \par

## 2021-07-01 NOTE — ASSESSMENT
[FreeTextEntry1] : Patient with above history without active cardiac symptoms who is stable \par \par \par History of old MI and, LAD multiple stents, last one 2005, improved ejection fraction without active cardiac symptoms. Continue his current medications. continue ecotrin forever , \par \par Hyperlipidemia: Mildly elevated triglycerides noncompliant to diet, LDL 58. Continue dietary restriction and medication. Monitor lipid profile and liver function tests\par \par Hypertension:improved , advise the patient to follow low-salt diet and home blood pressure monitoring. continue  losartan HCTZ together 100/12.5 mg po daily , continue  norvasc 5 mg pod aily ,  \par \par Hypothyroidism: Mildly elevated TSH, normal T3-T4 continue current dose of Synthroid. Continue monitor TSH levels.\par \par moderate to severe   aortic stenosis: Continuing monitor severity of aortic stenosis. currently he does not have symptoms   explained to him to pay attention to his symptoms like chest pain or SOB on exertion  \par \par follow up after 4 months \par \par \par \par \par

## 2021-07-06 ENCOUNTER — OUTPATIENT (OUTPATIENT)
Dept: OUTPATIENT SERVICES | Facility: HOSPITAL | Age: 76
LOS: 1 days | End: 2021-07-06
Payer: MEDICARE

## 2021-07-06 ENCOUNTER — APPOINTMENT (OUTPATIENT)
Dept: ORTHOPEDIC SURGERY | Facility: CLINIC | Age: 76
End: 2021-07-06
Payer: MEDICARE

## 2021-07-06 ENCOUNTER — APPOINTMENT (OUTPATIENT)
Dept: MRI IMAGING | Facility: CLINIC | Age: 76
End: 2021-07-06
Payer: MEDICARE

## 2021-07-06 DIAGNOSIS — S76.111A STRAIN OF RIGHT QUADRICEPS MUSCLE, FASCIA AND TENDON, INITIAL ENCOUNTER: ICD-10-CM

## 2021-07-06 DIAGNOSIS — Z98.890 OTHER SPECIFIED POSTPROCEDURAL STATES: Chronic | ICD-10-CM

## 2021-07-06 DIAGNOSIS — Z98.49 CATARACT EXTRACTION STATUS, UNSPECIFIED EYE: Chronic | ICD-10-CM

## 2021-07-06 PROCEDURE — 99214 OFFICE O/P EST MOD 30 MIN: CPT

## 2021-07-06 PROCEDURE — 73721 MRI JNT OF LWR EXTRE W/O DYE: CPT | Mod: 26,RT,ME

## 2021-07-06 PROCEDURE — 73721 MRI JNT OF LWR EXTRE W/O DYE: CPT

## 2021-07-06 PROCEDURE — G1004: CPT

## 2021-07-06 NOTE — CONSULT LETTER
[Dear  ___] : Dear  [unfilled], [Consult Letter:] : I had the pleasure of evaluating your patient, [unfilled]. [Please see my note below.] : Please see my note below. [Consult Closing:] : Thank you very much for allowing me to participate in the care of this patient.  If you have any questions, please do not hesitate to contact me. [Sincerely,] : Sincerely, [FreeTextEntry3] : Dr. Jomar Akhtar \par \par

## 2021-07-06 NOTE — DISCUSSION/SUMMARY
[de-identified] : The underlying pathophysiology was reviewed in great detail with the patient as well as the various treatment options, including ice, analgesics, NSAIDs, Physical therapy, steroid injections, surgical intervention. \par \par A prescription was provided for a MRI of the right knee to rule out retear of quadriceps tendon  \par \par Activity modifications and restrictions were discussed.\par \par FU once MRI results are obtained. \par \par All questions were answered, all alternatives discussed and the patient is in complete agreement with that plan. Follow-up appointment as instructed. Any issues and the patient will call or come in sooner.

## 2021-07-06 NOTE — HISTORY OF PRESENT ILLNESS
[de-identified] : SHILPI ANN is a 76 year male presenting to the office complaining of right knee pain. Patient notes he sustained a right quadriceps rupture after a fall in February 2021. He underwent surgical intervention at [facility] after the initial fall. Since surgery, patient reports continued pain, and limited function of the knee. He feels as if the quadriceps tendon is re-tore. He followed up with his initial surgeon who stated all was okay.  He  presents to the office ambulating independently. The patient describes the pain as a dull aching, and occasionally sharp pain localized to the anterior aspect of the right knee that is intermittent in nature. His   symptoms are exacerbated with any movement of the knee and weightbearing on the knee.   Pain is alleviated with rest.  Patient reports instability of the knee, but denies catching or locking of the knee. \par Patient is taking Tylenol for pain relief with mild to moderate relief in symptoms.

## 2021-07-13 ENCOUNTER — NON-APPOINTMENT (OUTPATIENT)
Age: 76
End: 2021-07-13

## 2021-07-15 ENCOUNTER — APPOINTMENT (OUTPATIENT)
Dept: ORTHOPEDIC SURGERY | Facility: CLINIC | Age: 76
End: 2021-07-15
Payer: MEDICARE

## 2021-07-15 PROCEDURE — 99213 OFFICE O/P EST LOW 20 MIN: CPT

## 2021-07-15 NOTE — DISCUSSION/SUMMARY
[de-identified] : The underlying pathophysiology was reviewed with the patient. XR films were reviewed with the patient. Discussed at length the nature of the patient’s condition. The right knee symptoms appear secondary to right quadriceps tendon s/p repair. \par \par At this time, I am recommending re-repair of the quadriceps tendon due to the patient's daily limitations and lack of strength of the leg. \par \par The underlying pathophysiology was reviewed with the patient. Treatment options were discussed including; surgical intervention. The patient wishes to proceed with right quadriceps tendon repair at this time. The risks and benefits were reviewed with the patient.  All of his questions were answered. He will meet with our surgical scheduler.		 \par \par \par Patient can continue activities as tolerated. All questions answered, understanding verbalized. Patient in agreement with plan of care. Follow up in

## 2021-07-15 NOTE — ADDENDUM
[FreeTextEntry1] : I, Adela Yang wrote this note acting as a scribe for Dr. Bharat Mann on Jul 15, 2021.\par \par

## 2021-07-15 NOTE — HISTORY OF PRESENT ILLNESS
[de-identified] : Pt is a 77 y/o male with right quadriceps tendon rupture. DOI: 2/8/21.  The surgery was was performed on 2/19/21.  He is status post surgical repair of the quadriceps tendon.  He has been attending PT.  He states that he is not regaining strength in the leg.  He has lower back issues and the PT is exacerbating the pain.  He presents today for another opinion.  He had an MRI on 7/6/21.

## 2021-07-15 NOTE — PHYSICAL EXAM
[de-identified] : Patient is WDWN, alert, and in no acute distress. Breathing is unlabored. He is grossly oriented to person, place, \par and time.\par \par Right Knee:\par Patient presents in knee immobilizer brace.\par Divot present over inferior aspect of quadriceps tendon\par Well healed incision is present. \par ROM: Lacking terminal knee extension.\par Strength is limited\par  [de-identified] : EXAM: MR KNEE RT - 7/6/21\par IMPRESSION:\par 1. Recurrent full-thickness retracted tear of the quadriceps tendon from its insertion. Tendon fibers are retracted by at least 3.7 cm with fluid and debris filling the tear gap. There is severe tendinosis of the remnant tendon fibers. There is adjacent sprain of the medial patellar retinaculum and adjacent strain of the vastus lateralis muscle.\par 2. Moderate radial tear involving the posterior horn of the medial meniscus.\par 3. Small joint effusion.\par \par STEFFANY MCCLELLAN MD; Attending Radiologist\par This document has been electronically signed. Jul 10 2021 1:01PM\par

## 2021-07-15 NOTE — END OF VISIT
[FreeTextEntry3] : All medical record entries made by the Scribe were at my,  Dr. Bharat Mann MD., direction and personally dictated by me on 07/15/2021. I have personally reviewed the chart and agree that the record accurately reflects my personal performance of the history, physical exam, assessment and plan.\par \par

## 2021-07-16 ENCOUNTER — OUTPATIENT (OUTPATIENT)
Dept: OUTPATIENT SERVICES | Facility: HOSPITAL | Age: 76
LOS: 1 days | End: 2021-07-16
Payer: MEDICARE

## 2021-07-16 ENCOUNTER — APPOINTMENT (OUTPATIENT)
Dept: CARDIOLOGY | Facility: CLINIC | Age: 76
End: 2021-07-16
Payer: MEDICARE

## 2021-07-16 VITALS
RESPIRATION RATE: 15 BRPM | DIASTOLIC BLOOD PRESSURE: 78 MMHG | TEMPERATURE: 98 F | SYSTOLIC BLOOD PRESSURE: 170 MMHG | WEIGHT: 158.07 LBS | OXYGEN SATURATION: 96 % | HEIGHT: 63 IN | HEART RATE: 67 BPM

## 2021-07-16 VITALS
BODY MASS INDEX: 27.66 KG/M2 | SYSTOLIC BLOOD PRESSURE: 150 MMHG | HEART RATE: 71 BPM | HEIGHT: 64 IN | OXYGEN SATURATION: 96 % | WEIGHT: 162 LBS | DIASTOLIC BLOOD PRESSURE: 76 MMHG

## 2021-07-16 VITALS — DIASTOLIC BLOOD PRESSURE: 76 MMHG | SYSTOLIC BLOOD PRESSURE: 124 MMHG

## 2021-07-16 DIAGNOSIS — S76.119A STRAIN OF UNSPECIFIED QUADRICEPS MUSCLE, FASCIA AND TENDON, INITIAL ENCOUNTER: ICD-10-CM

## 2021-07-16 DIAGNOSIS — Z98.49 CATARACT EXTRACTION STATUS, UNSPECIFIED EYE: Chronic | ICD-10-CM

## 2021-07-16 DIAGNOSIS — Z98.890 OTHER SPECIFIED POSTPROCEDURAL STATES: Chronic | ICD-10-CM

## 2021-07-16 DIAGNOSIS — S76.111A STRAIN OF RIGHT QUADRICEPS MUSCLE, FASCIA AND TENDON, INITIAL ENCOUNTER: ICD-10-CM

## 2021-07-16 DIAGNOSIS — Z01.818 ENCOUNTER FOR OTHER PREPROCEDURAL EXAMINATION: ICD-10-CM

## 2021-07-16 LAB
HCT VFR BLD CALC: 41.1 % — SIGNIFICANT CHANGE UP (ref 39–50)
HGB BLD-MCNC: 13.9 G/DL — SIGNIFICANT CHANGE UP (ref 13–17)
MCHC RBC-ENTMCNC: 29.3 PG — SIGNIFICANT CHANGE UP (ref 27–34)
MCHC RBC-ENTMCNC: 33.8 GM/DL — SIGNIFICANT CHANGE UP (ref 32–36)
MCV RBC AUTO: 86.7 FL — SIGNIFICANT CHANGE UP (ref 80–100)
NRBC # BLD: 0 /100 WBCS — SIGNIFICANT CHANGE UP (ref 0–0)
PLATELET # BLD AUTO: 155 K/UL — SIGNIFICANT CHANGE UP (ref 150–400)
RBC # BLD: 4.74 M/UL — SIGNIFICANT CHANGE UP (ref 4.2–5.8)
RBC # FLD: 13.4 % — SIGNIFICANT CHANGE UP (ref 10.3–14.5)
WBC # BLD: 6.58 K/UL — SIGNIFICANT CHANGE UP (ref 3.8–10.5)
WBC # FLD AUTO: 6.58 K/UL — SIGNIFICANT CHANGE UP (ref 3.8–10.5)

## 2021-07-16 PROCEDURE — 99214 OFFICE O/P EST MOD 30 MIN: CPT

## 2021-07-16 PROCEDURE — 85027 COMPLETE CBC AUTOMATED: CPT

## 2021-07-16 PROCEDURE — 36415 COLL VENOUS BLD VENIPUNCTURE: CPT

## 2021-07-16 PROCEDURE — G0463: CPT

## 2021-07-16 RX ORDER — DICLOFENAC SODIUM 50 MG/1
50 TABLET, DELAYED RELEASE ORAL
Qty: 30 | Refills: 2 | Status: DISCONTINUED | COMMUNITY
Start: 2020-10-28 | End: 2021-07-16

## 2021-07-16 RX ORDER — LOSARTAN POTASSIUM 100 MG/1
1 TABLET, FILM COATED ORAL
Qty: 0 | Refills: 0 | DISCHARGE

## 2021-07-16 RX ORDER — CLOPIDOGREL BISULFATE 75 MG/1
1 TABLET, FILM COATED ORAL
Qty: 0 | Refills: 0 | DISCHARGE

## 2021-07-16 NOTE — H&P PST ADULT - NSICDXFAMILYHX_GEN_ALL_CORE_FT
FAMILY HISTORY:  Sibling  Still living? No  FH: type 2 diabetes mellitus, Age at diagnosis: Age Unknown

## 2021-07-16 NOTE — H&P PST ADULT - NSICDXPASTMEDICALHX_GEN_ALL_CORE_FT
PAST MEDICAL HISTORY:  CAD (coronary artery disease)     COVID-19 vaccine series completed pfizer 2/22/2021    Hyperlipidemia     Hypertension     Hypothyroidism     Myocardial infarction 2004 with 4 stents    Rupture quadriceps tendon right    Stented coronary artery X4

## 2021-07-16 NOTE — ASSESSMENT
[FreeTextEntry1] : Patient with above history without active cardiac symptoms who is stable \par \par \par History of old MI and, LAD multiple stents, last one 2005, improved ejection fraction without active cardiac symptoms. Continue his current medications. continue ecotrin forever , \par \par Hyperlipidemia: Mildly elevated triglycerides noncompliant to diet, LDL 58. Continue dietary restriction and medication. Monitor lipid profile and liver function tests\par \par Hypertension:improved , advise the patient to follow low-salt diet and home blood pressure monitoring. continue  losartan HCTZ together 100/12.5 mg po daily , continue  norvasc 5 mg pod aily ,  \par \par Hypothyroidism: Mildly elevated TSH, normal T3-T4 continue current dose of Synthroid. Continue monitor TSH levels.\par \par moderate to severe   aortic stenosis: Continuing monitor severity of aortic stenosis. currently he does not have symptoms   explained to him to pay attention to his symptoms like chest pain or SOB on exertion \par \par Patient is overall optimal and stable for proposed surgery , patient is intermediate risk for intermediate risk surgery   continue his medication with parameters  \par \par follow up after 4 months \par \par \par \par \par

## 2021-07-16 NOTE — H&P PST ADULT - NSICDXPROBLEM_GEN_ALL_CORE_FT
PROBLEM DIAGNOSES  Problem: Rupture quadriceps tendon  Assessment and Plan: Right quadriceps tendon repair is planned for 7/23/2021.  Proof of covid vaccine provided.  medical and cardiac clearance requested.  Pre op instructions were reviewed with patient and wife.  best wishes offered.

## 2021-07-16 NOTE — H&P PST ADULT - HISTORY OF PRESENT ILLNESS
75 yo male reports right quadriceps tear s/p fall injury 2/2021 for which he had surgical repair.  He developed lower back pain for which he was ordered MRI which revealed right quadriceps tear.  He is scheduled for right quadriceps tendon repair on 7/23/2021 at [facility].

## 2021-07-16 NOTE — H&P PST ADULT - MUSCULOSKELETAL
no joint swelling/no joint erythema/no joint warmth/decreased ROM due to pain details… detailed exam

## 2021-07-16 NOTE — PHYSICAL EXAM

## 2021-07-16 NOTE — HISTORY OF PRESENT ILLNESS
[FreeTextEntry1] : Patient with history of hypertension and hyperlipidemia, coronary artery disease status post PCI stent 2004,   Prior MI ,  hypothyroid  came for pre op cardiac evaluation torn quadriceps muscle repair which is recurred ,   Patient denies any active cardiac symptoms patients  recent echo showed moderate AS without worsening , patient \par \par  denies  shortness of breath or dizziness ,  patient exercise regularly ,  patient had follow up echo the gradient remain some what same , does have distal anteroseptal hypokinesis due to prior MI \par \par Patient does have occasional GERD symptoms for which he takes PRN medication  m no change in pattern \par \par Patient  does  have chronic cervical spine pain with restricted motion , recommended surgery , how ever he does not want it , patient did have epidural injection for lower back for chronic pain which made it better , \par \par Patient blood work  showed  normal renal function ,  mild dyslipidemia ,  low HDL  37 LDL at target level \par \par TSH elevated normal T3 T4 level \par \par \par

## 2021-07-16 NOTE — CARDIOLOGY SUMMARY
[de-identified] : 7/1/21 sinus rhythm  AS infarct IVCD non specific ST  T changes [de-identified] : 6/11/20  medium size distal anterior , apical MI without ischemia  EF 46% [de-identified] : 6/28/21  EF 54% Mild Dd  moderate AS CORRINE 1.3 Sq cm , PG 27 mm hg , MG 16 mm hg , minima AI , mild dilated ascending aorta 4.1  [de-identified] : 3/15/2004 RCA LAD,2/14/2005 LAD LCX

## 2021-07-19 ENCOUNTER — APPOINTMENT (OUTPATIENT)
Dept: INTERNAL MEDICINE | Facility: CLINIC | Age: 76
End: 2021-07-19
Payer: MEDICARE

## 2021-07-19 VITALS
HEIGHT: 64 IN | WEIGHT: 162 LBS | DIASTOLIC BLOOD PRESSURE: 78 MMHG | RESPIRATION RATE: 14 BRPM | HEART RATE: 78 BPM | SYSTOLIC BLOOD PRESSURE: 126 MMHG | BODY MASS INDEX: 27.66 KG/M2 | OXYGEN SATURATION: 98 % | TEMPERATURE: 98.3 F

## 2021-07-19 DIAGNOSIS — S76.111A STRAIN OF RIGHT QUADRICEPS MUSCLE, FASCIA AND TENDON, INITIAL ENCOUNTER: ICD-10-CM

## 2021-07-19 DIAGNOSIS — Z01.818 ENCOUNTER FOR OTHER PREPROCEDURAL EXAMINATION: ICD-10-CM

## 2021-07-19 DIAGNOSIS — I25.10 ATHEROSCLEROTIC HEART DISEASE OF NATIVE CORONARY ARTERY W/OUT ANGINA PECTORIS: ICD-10-CM

## 2021-07-19 PROCEDURE — 99214 OFFICE O/P EST MOD 30 MIN: CPT

## 2021-07-19 NOTE — RESULTS/DATA
[] : results reviewed [ECG Reviewed] : reviewed [NSR] : normal sinus rhythm [de-identified] : Normal [de-identified] : glucose 105 [FreeTextEntry3] : old anteroseptal infarct

## 2021-07-19 NOTE — END OF VISIT
[FreeTextEntry3] : "I, Manas Woodson, personally scribed the services dictated to me by Dr. Jeremiah Patel MD in this documentation on 07/19/2021 "\par \par "I Dr. Jeremiah Patel MD, personally performed the services described in this documentation on 07/19/2021 for the patient as scribed by Manas Woodson in my presence. I have reviewed and verified that all the information is accurate and true."\par \par

## 2021-07-19 NOTE — PHYSICAL EXAM
[No Acute Distress] : no acute distress [Well Nourished] : well nourished [Well Developed] : well developed [Well-Appearing] : well-appearing [Normal Voice/Communication] : normal voice/communication [Normal Sclera/Conjunctiva] : normal sclera/conjunctiva [PERRL] : pupils equal round and reactive to light [EOMI] : extraocular movements intact [Normal Outer Ear/Nose] : the outer ears and nose were normal in appearance [No JVD] : no jugular venous distention [No Lymphadenopathy] : no lymphadenopathy [Supple] : supple [Thyroid Normal, No Nodules] : the thyroid was normal and there were no nodules present [No Respiratory Distress] : no respiratory distress  [No Accessory Muscle Use] : no accessory muscle use [Clear to Auscultation] : lungs were clear to auscultation bilaterally [Normal Rate] : normal rate  [Regular Rhythm] : with a regular rhythm [Normal S1, S2] : normal S1 and S2 [II] : a grade 2 [No Carotid Bruits] : no carotid bruits [No Abdominal Bruit] : a ~M bruit was not heard ~T in the abdomen [No Varicosities] : no varicosities [Pedal Pulses Present] : the pedal pulses are present [No Edema] : there was no peripheral edema [No Palpable Aorta] : no palpable aorta [No Extremity Clubbing/Cyanosis] : no extremity clubbing/cyanosis [Soft] : abdomen soft [Non Tender] : non-tender [Non-distended] : non-distended [No Masses] : no abdominal mass palpated [No HSM] : no HSM [Normal Bowel Sounds] : normal bowel sounds [Normal Supraclavicular Nodes] : no supraclavicular lymphadenopathy [Normal Posterior Cervical Nodes] : no posterior cervical lymphadenopathy [Normal Anterior Cervical Nodes] : no anterior cervical lymphadenopathy [No CVA Tenderness] : no CVA  tenderness [No Spinal Tenderness] : no spinal tenderness [No Joint Swelling] : no joint swelling [Grossly Normal Strength/Tone] : grossly normal strength/tone [No Rash] : no rash [Coordination Grossly Intact] : coordination grossly intact [No Focal Deficits] : no focal deficits [Normal Gait] : normal gait [Deep Tendon Reflexes (DTR)] : deep tendon reflexes were 2+ and symmetric [Speech Grossly Normal] : speech grossly normal [Memory Grossly Normal] : memory grossly normal [Normal Affect] : the affect was normal [Alert and Oriented x3] : oriented to person, place, and time [Normal Mood] : the mood was normal [Normal Insight/Judgement] : insight and judgment were intact

## 2021-07-19 NOTE — HISTORY OF PRESENT ILLNESS
[Aortic Stenosis] : aortic stenosis [Coronary Artery Disease] : coronary artery disease [Atrial Fibrillation] : no atrial fibrillation [Recent Myocardial Infarction] : no recent myocardial infarction [Implantable Device/Pacemaker] : no implantable device/pacemaker [Asthma] : no asthma [COPD] : no COPD [Sleep Apnea] : no sleep apnea [Smoker] : not a smoker [Family Member] : no family member with adverse anesthesia reaction/sudden death [Self] : no previous adverse anesthesia reaction [Chronic Anticoagulation] : no chronic anticoagulation [Chronic Kidney Disease] : no chronic kidney disease [Diabetes] : no diabetes [FreeTextEntry1] : tendon repair right knee [FreeTextEntry2] : July 23, 2021 [FreeTextEntry3] : Dr. Ayoub [FreeTextEntry4] : Pre Op

## 2021-07-21 RX ORDER — CEFAZOLIN SODIUM 1 G
2000 VIAL (EA) INJECTION ONCE
Refills: 0 | Status: DISCONTINUED | OUTPATIENT
Start: 2021-07-23 | End: 2021-08-06

## 2021-07-22 ENCOUNTER — TRANSCRIPTION ENCOUNTER (OUTPATIENT)
Age: 76
End: 2021-07-22

## 2021-07-22 NOTE — H&P PST ADULT - PRO ARRIVE FROM
Problem: Prexisting or High Potential for Compromised Skin Integrity  Goal: Skin integrity is maintained or improved  Description: INTERVENTIONS:  - Identify patients at risk for skin breakdown  - Assess and monitor skin integrity  - Assess and monitor nutrition and hydration status  - Monitor labs   - Assess for incontinence   - Turn and reposition patient  - Assist with mobility/ambulation  - Relieve pressure over bony prominences  - Avoid friction and shearing  - Provide appropriate hygiene as needed including keeping skin clean and dry  - Evaluate need for skin moisturizer/barrier cream  - Collaborate with interdisciplinary team   - Patient/family teaching  - Consider wound care consult   Outcome: Progressing     Problem: MOBILITY - ADULT  Goal: Maintain or return to baseline ADL function  Description: INTERVENTIONS:  -  Assess patient's ability to carry out ADLs; assess patient's baseline for ADL function and identify physical deficits which impact ability to perform ADLs (bathing, care of mouth/teeth, toileting, grooming, dressing, etc )  - Assess/evaluate cause of self-care deficits   - Assess range of motion  - Assess patient's mobility; develop plan if impaired  - Assess patient's need for assistive devices and provide as appropriate  - Encourage maximum independence but intervene and supervise when necessary  - Involve family in performance of ADLs  - Assess for home care needs following discharge   - Consider OT consult to assist with ADL evaluation and planning for discharge  - Provide patient education as appropriate  Outcome: Progressing  Goal: Maintains/Returns to pre admission functional level  Description: INTERVENTIONS:  - Perform BMAT or MOVE assessment daily    - Set and communicate daily mobility goal to care team and patient/family/caregiver     - Collaborate with rehabilitation services on mobility goals if consulted  - Out of bed for toileting  - Record patient progress and toleration of activity level   Outcome: Progressing     Problem: Potential for Falls  Goal: Patient will remain free of falls  Description: INTERVENTIONS:  - Educate patient/family on patient safety including physical limitations  - Instruct patient to call for assistance with activity   - Consult OT/PT to assist with strengthening/mobility   - Keep Call bell within reach  - Keep bed low and locked with side rails adjusted as appropriate  - Keep care items and personal belongings within reach  - Initiate and maintain comfort rounds  - Make Fall Risk Sign visible to staff  - Apply yellow socks and bracelet for high fall risk patients  - Consider moving patient to room near nurses station  Outcome: Progressing     Problem: METABOLIC, FLUID AND ELECTROLYTES - ADULT  Goal: Electrolytes maintained within normal limits  Description: INTERVENTIONS:  - Monitor labs and assess patient for signs and symptoms of electrolyte imbalances  - Administer electrolyte replacement as ordered  - Monitor response to electrolyte replacements, including repeat lab results as appropriate  - Instruct patient on fluid and nutrition as appropriate  Outcome: Progressing     Problem: SKIN/TISSUE INTEGRITY - ADULT  Goal: Skin Integrity remains intact(Skin Breakdown Prevention)  Description: Assess:  -Inspect skin when repositioning, toileting, and assisting with ADLS  -Assess extremities for adequate circulation and sensation     Bed Management:  -Have minimal linens on bed & keep smooth, unwrinkled  -Change linens as needed when moist or perspiring    Toileting:  -Offer bedside commode    Activity:  -Encourage activity and walks on unit  -Encourage or provide ROM exercises   -Use appropriate equipment to lift or move patient in bed    Skin Care:  -Avoid use of baby powder, tape, friction and shearing, hot water or constrictive clothing  -Do not massage red bony areas  Outcome: Progressing     Problem: Nutrition/Hydration-ADULT  Goal: Nutrient/Hydration intake home appropriate for improving, restoring or maintaining nutritional needs  Description: Monitor and assess patient's nutrition/hydration status for malnutrition  Collaborate with interdisciplinary team and initiate plan and interventions as ordered  Monitor patient's weight and dietary intake as ordered or per policy  Utilize nutrition screening tool and intervene as necessary  Determine patient's food preferences and provide high-protein, high-caloric foods as appropriate       INTERVENTIONS:  - Monitor oral intake, urinary output, labs, and treatment plans  - Assess nutrition and hydration status and recommend course of action  - Evaluate amount of meals eaten  - Assist patient with eating if necessary   - Allow adequate time for meals  - Recommend/ encourage appropriate diets, oral nutritional supplements, and vitamin/mineral supplements  - Order, calculate, and assess calorie counts as needed  - Recommend, monitor, and adjust tube feedings and TPN/PPN based on assessed needs  - Assess need for intravenous fluids  - Provide specific nutrition/hydration education as appropriate  - Include patient/family/caregiver in decisions related to nutrition  Outcome: Progressing

## 2021-07-22 NOTE — ASU PATIENT PROFILE, ADULT - PMH
CAD (coronary artery disease)    COVID-19 vaccine series completed  pfizer 2/22/2021  Hyperlipidemia    Hypertension    Hypothyroidism    Myocardial infarction  2004 with 4 stents  Rupture quadriceps tendon  right  Stented coronary artery  X4

## 2021-07-22 NOTE — ASU PATIENT PROFILE, ADULT - PSH
History of cardiac cath  2004,2005  Quadriceps muscle rupture  with repair 2/2021  S/P cataract extraction  2018

## 2021-07-23 ENCOUNTER — OUTPATIENT (OUTPATIENT)
Dept: OUTPATIENT SERVICES | Facility: HOSPITAL | Age: 76
LOS: 1 days | End: 2021-07-23
Payer: MEDICARE

## 2021-07-23 ENCOUNTER — APPOINTMENT (OUTPATIENT)
Dept: ORTHOPEDIC SURGERY | Facility: HOSPITAL | Age: 76
End: 2021-07-23

## 2021-07-23 ENCOUNTER — RESULT REVIEW (OUTPATIENT)
Age: 76
End: 2021-07-23

## 2021-07-23 VITALS
OXYGEN SATURATION: 97 % | HEART RATE: 69 BPM | HEIGHT: 64 IN | RESPIRATION RATE: 10 BRPM | SYSTOLIC BLOOD PRESSURE: 145 MMHG | WEIGHT: 157.41 LBS | DIASTOLIC BLOOD PRESSURE: 67 MMHG | TEMPERATURE: 99 F

## 2021-07-23 VITALS
HEART RATE: 73 BPM | OXYGEN SATURATION: 96 % | DIASTOLIC BLOOD PRESSURE: 48 MMHG | SYSTOLIC BLOOD PRESSURE: 146 MMHG | RESPIRATION RATE: 16 BRPM

## 2021-07-23 DIAGNOSIS — Z98.49 CATARACT EXTRACTION STATUS, UNSPECIFIED EYE: Chronic | ICD-10-CM

## 2021-07-23 DIAGNOSIS — S76.119A STRAIN OF UNSPECIFIED QUADRICEPS MUSCLE, FASCIA AND TENDON, INITIAL ENCOUNTER: Chronic | ICD-10-CM

## 2021-07-23 DIAGNOSIS — Z98.890 OTHER SPECIFIED POSTPROCEDURAL STATES: Chronic | ICD-10-CM

## 2021-07-23 DIAGNOSIS — S76.111A STRAIN OF RIGHT QUADRICEPS MUSCLE, FASCIA AND TENDON, INITIAL ENCOUNTER: ICD-10-CM

## 2021-07-23 PROCEDURE — 88304 TISSUE EXAM BY PATHOLOGIST: CPT | Mod: 26

## 2021-07-23 PROCEDURE — 97161 PT EVAL LOW COMPLEX 20 MIN: CPT

## 2021-07-23 PROCEDURE — 27386 REPAIR/GRAFT OF THIGH MUSCLE: CPT | Mod: RT

## 2021-07-23 PROCEDURE — 88304 TISSUE EXAM BY PATHOLOGIST: CPT

## 2021-07-23 RX ORDER — CHLORHEXIDINE GLUCONATE 213 G/1000ML
1 SOLUTION TOPICAL ONCE
Refills: 0 | Status: COMPLETED | OUTPATIENT
Start: 2021-07-23 | End: 2021-07-23

## 2021-07-23 RX ORDER — OXYCODONE HYDROCHLORIDE 5 MG/1
5 TABLET ORAL ONCE
Refills: 0 | Status: DISCONTINUED | OUTPATIENT
Start: 2021-07-23 | End: 2021-07-23

## 2021-07-23 RX ORDER — DICLOFENAC SODIUM 75 MG/1
1 TABLET, DELAYED RELEASE ORAL
Qty: 0 | Refills: 0 | DISCHARGE

## 2021-07-23 RX ORDER — APREPITANT 80 MG/1
40 CAPSULE ORAL ONCE
Refills: 0 | Status: COMPLETED | OUTPATIENT
Start: 2021-07-23 | End: 2021-07-23

## 2021-07-23 RX ORDER — ONDANSETRON 8 MG/1
4 TABLET, FILM COATED ORAL ONCE
Refills: 0 | Status: DISCONTINUED | OUTPATIENT
Start: 2021-07-23 | End: 2021-08-06

## 2021-07-23 RX ORDER — IBUPROFEN 200 MG
1 TABLET ORAL
Qty: 30 | Refills: 0
Start: 2021-07-23

## 2021-07-23 RX ORDER — HYDROMORPHONE HYDROCHLORIDE 2 MG/ML
0.5 INJECTION INTRAMUSCULAR; INTRAVENOUS; SUBCUTANEOUS
Refills: 0 | Status: DISCONTINUED | OUTPATIENT
Start: 2021-07-23 | End: 2021-07-23

## 2021-07-23 RX ORDER — SODIUM CHLORIDE 9 MG/ML
1000 INJECTION, SOLUTION INTRAVENOUS
Refills: 0 | Status: DISCONTINUED | OUTPATIENT
Start: 2021-07-23 | End: 2021-08-06

## 2021-07-23 RX ADMIN — APREPITANT 40 MILLIGRAM(S): 80 CAPSULE ORAL at 09:34

## 2021-07-23 RX ADMIN — HYDROMORPHONE HYDROCHLORIDE 0.5 MILLIGRAM(S): 2 INJECTION INTRAMUSCULAR; INTRAVENOUS; SUBCUTANEOUS at 13:45

## 2021-07-23 RX ADMIN — HYDROMORPHONE HYDROCHLORIDE 0.5 MILLIGRAM(S): 2 INJECTION INTRAMUSCULAR; INTRAVENOUS; SUBCUTANEOUS at 13:30

## 2021-07-23 RX ADMIN — CHLORHEXIDINE GLUCONATE 1 APPLICATION(S): 213 SOLUTION TOPICAL at 09:07

## 2021-07-23 RX ADMIN — SODIUM CHLORIDE 75 MILLILITER(S): 9 INJECTION, SOLUTION INTRAVENOUS at 13:25

## 2021-07-23 NOTE — ASU DISCHARGE PLAN (ADULT/PEDIATRIC) - ASU DC SPECIAL INSTRUCTIONSFT
keep cast clean and dry  elevate left leg to reduce stiffness/swelling  call office for appointment  to be seen  10-14 days post op

## 2021-07-23 NOTE — ASU DISCHARGE PLAN (ADULT/PEDIATRIC) - CARE PROVIDER_API CALL
Bharat Mann)  Orthopaedic Surgery  [address]  [place] [address]  Phone: [phone]  Fax: [phone]  Follow Up Time:

## 2021-07-26 PROBLEM — Z92.29 PERSONAL HISTORY OF OTHER DRUG THERAPY: Chronic | Status: ACTIVE | Noted: 2021-07-16

## 2021-07-26 PROBLEM — Z95.5 PRESENCE OF CORONARY ANGIOPLASTY IMPLANT AND GRAFT: Chronic | Status: ACTIVE | Noted: 2021-07-16

## 2021-07-29 ENCOUNTER — APPOINTMENT (OUTPATIENT)
Dept: ORTHOPEDIC SURGERY | Facility: CLINIC | Age: 76
End: 2021-07-29
Payer: MEDICARE

## 2021-07-29 VITALS — HEIGHT: 64 IN | WEIGHT: 162 LBS | BODY MASS INDEX: 27.66 KG/M2

## 2021-07-29 PROCEDURE — 99024 POSTOP FOLLOW-UP VISIT: CPT

## 2021-07-29 NOTE — ADDENDUM
[FreeTextEntry1] : I, Adela Yang wrote this note acting as a scribe for Dr. Bharat Mann on Jul 29, 2021.\par \par

## 2021-07-29 NOTE — HISTORY OF PRESENT ILLNESS
[de-identified] : s/p revision repair of right quadriceps tendon rupture and application of right long leg cylinder cast. [de-identified] : The patient is a 76 year male who returns for the 1st postoperative visit after undergoing revision repair of right quadriceps tendon rupture and application of right long leg cylinder cast. at [facility]. The surgery was on 07/23/2021. The patient is recovering at home. He reports mild postoperative pain. He reports taking one Percocet and then transitioning to Ibuprofen for pain. He takes 81mg baby aspirin preventatively against DVT. [de-identified] : Patient is WDWN, alert, and in no acute distress. Breathing is unlabored. He is grossly oriented to person, place, and time. \par \par Cast was windowed to visualize the incision site. Sutures were removed (7/29/21).\par Incision site is healing well. No signs of postoperative infection are present. Normal amount of postoperative edema and tenderness. [de-identified] : no imaging today [de-identified] : Cast was windowed.\par Sutures removed (7/29/21). Benzoin and steri strips applied.\par Patient advised he will continue in a long leg cylinder cast for another 5 weeks.\par He may WBAT with the assistance of a walker, cane or crutches.\par Instructed on leg lift exercises.\par Continue taking 81mg baby aspirin.\par Follow up in 5 weeks.

## 2021-07-29 NOTE — END OF VISIT
[FreeTextEntry3] : All medical record entries made by the Scribe were at my,  Dr. Bharat Mann MD., direction and personally dictated by me on 07/29/2021. I have personally reviewed the chart and agree that the record accurately reflects my personal performance of the history, physical exam, assessment and plan.\par \par

## 2021-08-18 ENCOUNTER — APPOINTMENT (OUTPATIENT)
Dept: INTERNAL MEDICINE | Facility: CLINIC | Age: 76
End: 2021-08-18

## 2021-08-23 ENCOUNTER — RX RENEWAL (OUTPATIENT)
Age: 76
End: 2021-08-23

## 2021-09-14 ENCOUNTER — APPOINTMENT (OUTPATIENT)
Dept: ORTHOPEDIC SURGERY | Facility: CLINIC | Age: 76
End: 2021-09-14
Payer: MEDICARE

## 2021-09-14 PROCEDURE — 99024 POSTOP FOLLOW-UP VISIT: CPT

## 2021-09-14 NOTE — HISTORY OF PRESENT ILLNESS
[de-identified] : s/p revision repair of right quadriceps tendon rupture and application of right long leg cylinder cast. [de-identified] : The patient is a 76 year male who returns for the 2nd postoperative visit after undergoing revision repair of right quadriceps tendon rupture and application of right long leg cylinder cast. at [facility]. The surgery was on 07/23/2021. He returns on 9/14/21 for the removal of the right long leg cylinder cast. He notes some pain along the lateral aspect of the knee. He has been performing leg lift strengthening exercises on his own at home. [de-identified] : Patient is WDWN, alert, and in no acute distress. Breathing is unlabored. He is grossly oriented to person, place, and time. \par \par Cast was removed (9/14/21).\par There appears to be a well healed scar anteriorly.\par ROM: 0-45° \par  [de-identified] : no imaging today [de-identified] : The patient wishes to proceed with physical therapy of the right knee.  A script was given.\par He was advised to use Vitamin E oil on the scar.\par He was placed into a knee immobilizer brace and advised to wear the brace when ambulating.\par Follow up in 6 weeks.

## 2021-09-14 NOTE — END OF VISIT
[FreeTextEntry3] : All medical record entries made by the Scribe were at my,  Dr. Bharat Mann MD., direction and personally dictated by me on 09/14/2021. I have personally reviewed the chart and agree that the record accurately reflects my personal performance of the history, physical exam, assessment and plan.

## 2021-09-14 NOTE — ADDENDUM
[FreeTextEntry1] : I, Adela Yang wrote this note acting as a scribe for Dr. Bharat Mann on Sep 14, 2021.\par \par

## 2021-10-26 ENCOUNTER — APPOINTMENT (OUTPATIENT)
Dept: ORTHOPEDIC SURGERY | Facility: CLINIC | Age: 76
End: 2021-10-26
Payer: MEDICARE

## 2021-10-26 PROCEDURE — 73564 X-RAY EXAM KNEE 4 OR MORE: CPT | Mod: 50

## 2021-10-26 PROCEDURE — 99213 OFFICE O/P EST LOW 20 MIN: CPT

## 2021-10-26 NOTE — REASON FOR VISIT
[Follow-Up Visit] : a follow-up visit for [FreeTextEntry2] : s/p revision repair of right quadriceps tendon rupture and application of right long leg cylinder cast.

## 2021-10-26 NOTE — DISCUSSION/SUMMARY
[de-identified] : The underlying pathophysiology was reviewed with the patient. XR films were reviewed with the patient. Discussed at length the nature of the patient’s condition.\par \par I discussed with the patient, that maximum improvement will take up to 1 year post-surgery.\par He was instructed on low impact activities such as walking, stationary bike and aquatic therapy.\par He was advised to continue with PT and use caution as to not try to regain too much flexion. I told him that he would be at risk for re-tearing the tendon.\par The patient wishes to proceed with physical therapy of the right knee and lumbar spine. A script was given.	\par Paperwork for a handicap parking pass was filled out at the request of the patient. \par \par Patient can continue activities as tolerated. All questions answered, understanding verbalized. Patient in agreement with plan of care.

## 2021-10-26 NOTE — END OF VISIT
[FreeTextEntry3] : All medical record entries made by the Scribe were at my,  Dr. Bharat Mann MD., direction and personally dictated by me on 10/26/2021. I have personally reviewed the chart and agree that the record accurately reflects my personal performance of the history, physical exam, assessment and plan.

## 2021-10-26 NOTE — HISTORY OF PRESENT ILLNESS
[de-identified] : The patient is a 76 year male who returns for the 2nd postoperative visit after undergoing revision repair of right quadriceps tendon rupture and application of right long leg cylinder cast. at [facility]. The surgery was on 07/23/2021. He returns on 10/26/21 noting he is able to fully extend the knee when in a seated position. He notes difficulty with ascending and descending stairs. He reports difficulty with flexion of the knee. He notes he still ambulates with a limp. He is currently in PT.\par He additionally complains of lower back pain which he attributes to how he is walking and compensating.

## 2021-10-26 NOTE — ADDENDUM
[FreeTextEntry1] : I, Adela Yang wrote this note acting as a scribe for Dr. Bharat Mann on Oct 26, 2021.

## 2021-10-26 NOTE — PHYSICAL EXAM
[de-identified] : Patient is WDWN, alert, and in no acute distress. Breathing is unlabored. He is grossly oriented to person, place, and time.\par \par Patient ambulates with a cane. \par \par Right Lower Extremity:\par Long leg cylinder cast was removed 9/14/21\par There appears to be a well healed scar anteriorly\par Mild edema noted medially through the right knee\par ROM: 9-118°   [de-identified] : AP, lateral, skyline, and tunnel views of the right and left knees were obtained and revealed medial compartment arthritis.

## 2021-11-12 ENCOUNTER — APPOINTMENT (OUTPATIENT)
Dept: CARDIOLOGY | Facility: CLINIC | Age: 76
End: 2021-11-12
Payer: MEDICARE

## 2021-11-12 ENCOUNTER — NON-APPOINTMENT (OUTPATIENT)
Age: 76
End: 2021-11-12

## 2021-11-12 VITALS
OXYGEN SATURATION: 95 % | DIASTOLIC BLOOD PRESSURE: 78 MMHG | SYSTOLIC BLOOD PRESSURE: 157 MMHG | HEART RATE: 85 BPM | BODY MASS INDEX: 27.31 KG/M2 | WEIGHT: 160 LBS | HEIGHT: 64 IN

## 2021-11-12 PROCEDURE — 99214 OFFICE O/P EST MOD 30 MIN: CPT

## 2021-11-12 PROCEDURE — 93000 ELECTROCARDIOGRAM COMPLETE: CPT

## 2021-11-12 RX ORDER — DICLOFENAC SODIUM 50 MG/1
50 TABLET, DELAYED RELEASE ORAL
Qty: 30 | Refills: 0 | Status: DISCONTINUED | COMMUNITY
Start: 2021-06-25 | End: 2021-11-12

## 2021-11-12 NOTE — HISTORY OF PRESENT ILLNESS
[FreeTextEntry1] : Patient with history of hypertension and hyperlipidemia, coronary artery disease status post PCI stent 2004,   Prior MI ,  hypothyroid  who had quadriceps tear repair came for follow up , says he is doing ok other than pain in left knee now , taking NSAID  , his blood pressure is elevated \par \par   Patient denies any active cardiac symptoms patients  recent echo showed moderate AS without worsening ,\par \par denies  shortness of breath or dizziness , \par Patient does have occasional GERD symptoms for which he takes PRN medication  m no change in pattern \par \par Patient  does  have chronic cervical spine pain with restricted motion , recommended surgery , how ever he does not want it , patient did have epidural injection for lower back for chronic pain which made it better , \par \par Patient blood work  showed  normal renal function ,  mild dyslipidemia ,  low HDL  37 LDL at target level \par \par TSH elevated normal T3 T4 level \par \par \par

## 2021-11-12 NOTE — PHYSICAL EXAM

## 2021-11-12 NOTE — CARDIOLOGY SUMMARY
[de-identified] : 11/12/21 sinus rhythm  AS infarct IVCD non specific ST  T changes [de-identified] : 6/11/20  medium size distal anterior , apical MI without ischemia  EF 46% [de-identified] : 6/28/21  EF 54% Mild Dd  moderate AS CORRINE 1.3 Sq cm , PG 27 mm hg , MG 16 mm hg , minima AI , mild dilated ascending aorta 4.1  [de-identified] : 3/15/2004 RCA LAD,2/14/2005 LAD LCX

## 2021-11-12 NOTE — ASSESSMENT
[FreeTextEntry1] : Patient with above history without active cardiac symptoms who is stable \par \par \par History of old MI and, LAD multiple stents, last one 2005, improved ejection fraction without active cardiac symptoms. Continue his current medications. continue ecotrin forever , \par \par Hyperlipidemia: Mildly elevated triglycerides noncompliant to diet, LDL 58. Continue dietary restriction and medication. Monitor lipid profile and liver function tests\par \par Hypertension: mild elevated as he is having pain , taking  NSAID  ,advise the patient to follow low-salt diet and home blood pressure monitoring. continue  losartan HCTZ together 100/12.5 mg po daily , continue  norvasc 5 mg po daily ,  \par \par Hypothyroidism: Mildly elevated TSH, normal T3-T4 continue current dose of Synthroid. Continue monitor TSH levels.\par \par moderate to severe   aortic stenosis: Continuing monitor severity of aortic stenosis. currently he does not have symptoms   explained to him to pay attention to his symptoms like chest pain or SOB on exertion \par \par \par follow up after 4 months \par \par \par \par \par

## 2021-11-22 ENCOUNTER — APPOINTMENT (OUTPATIENT)
Dept: ULTRASOUND IMAGING | Facility: CLINIC | Age: 76
End: 2021-11-22
Payer: MEDICARE

## 2021-11-22 ENCOUNTER — APPOINTMENT (OUTPATIENT)
Dept: ORTHOPEDIC SURGERY | Facility: CLINIC | Age: 76
End: 2021-11-22
Payer: MEDICARE

## 2021-11-22 VITALS — WEIGHT: 160 LBS | HEIGHT: 64 IN | BODY MASS INDEX: 27.31 KG/M2

## 2021-11-22 DIAGNOSIS — M17.10 UNILATERAL PRIMARY OSTEOARTHRITIS, UNSPECIFIED KNEE: ICD-10-CM

## 2021-11-22 DIAGNOSIS — M25.562 PAIN IN LEFT KNEE: ICD-10-CM

## 2021-11-22 DIAGNOSIS — M54.50 LOW BACK PAIN, UNSPECIFIED: ICD-10-CM

## 2021-11-22 PROCEDURE — 93970 EXTREMITY STUDY: CPT

## 2021-11-22 PROCEDURE — 99213 OFFICE O/P EST LOW 20 MIN: CPT

## 2021-11-22 PROCEDURE — 72100 X-RAY EXAM L-S SPINE 2/3 VWS: CPT

## 2021-11-22 RX ORDER — IBUPROFEN 600 MG/1
600 TABLET, FILM COATED ORAL
Qty: 30 | Refills: 0 | Status: COMPLETED | COMMUNITY
Start: 2021-07-23

## 2021-11-22 RX ORDER — METHYLPREDNISOLONE 4 MG/1
4 TABLET ORAL
Qty: 21 | Refills: 0 | Status: COMPLETED | COMMUNITY
Start: 2021-06-22

## 2021-11-22 RX ORDER — OXYCODONE AND ACETAMINOPHEN 5; 325 MG/1; MG/1
5-325 TABLET ORAL
Qty: 5 | Refills: 0 | Status: COMPLETED | COMMUNITY
Start: 2021-07-23

## 2021-11-22 NOTE — DISCUSSION/SUMMARY
[de-identified] : The underlying pathophysiology was reviewed with the patient. XR films were reviewed with the patient. Discussed at length the nature of the patient’s condition. Their left knee symptoms appear secondary to a popiteal cyst. \par \par A left Knee MRI script was given to assess for a popiteal / baker cyst. \par \par A lumbar MRI script was been given do to S/p diskectomy with persistent LE complaints. \par \par A new PT script was given for lumbar spine and BL knee pain. \par \par A script for a venous doppler of the left calf was given to rule out blood clot. \par \par \par Patient can continue activities as tolerated. All questions answered, understanding verbalized. Patient in agreement with plan of care. Patient can call to review MRI results and discuss further treatment options.

## 2021-11-22 NOTE — HISTORY OF PRESENT ILLNESS
[de-identified] : The patient is a 76 year male who returns for follow up evaluation S/P revision repair of right quadriceps tendon rupture and application of right long leg cylinder cast done at [facility]. The surgery was on 07/23/2021. He returned on 10/26/21 noting he was able to fully extend the knee when in a seated position. He noted difficulty with ascending and descending stairs. He reported difficulty with flexion of the knee. He noted he still ambulates with a limp. He has enrolled in PT. He states due to a compensatory gait he was experiencing some lower back pain at last follow up. Pt has a new complaint on todays exam of left posterior knee pain.  He states that his calf feels tight.  The pain radiates down his left leg as well.  He occasionally has pain in his left buttock.  He has difficulty standing and walking.  His left leg feels weak.

## 2021-11-22 NOTE — ADDENDUM
[FreeTextEntry1] : Documented by Stephan Ayala acting as a scribe for Dr. Bharat Mann on 11/22/2021. All medical record entries made by the Scribe were at my, Dr. Bharat Mann, direction and personally dictated by me on 11/22/2021. I have reviewed the chart and agree that the record accurately reflects my personal performance of the history, physical exam, assessment and plan. I have also personally directed, reviewed, and agreed with the chart.

## 2021-11-22 NOTE — PHYSICAL EXAM
[Poor Appearance] : well-appearing [Acute Distress] : not in acute distress [de-identified] : Patient is WDWN, alert, and in no acute distress. Breathing is unlabored. He is grossly oriented to person, place, and time.\par \par Patient ambulates with a cane. \par \par Left Lower Extremity:\par Long leg cylinder cast was removed 9/14/21\par Well healed midline incision. \par Vascular: swelling about the left calf\par Musculoskeletal: left calf tightness and tenderness.  [de-identified] : AP, lateral, skyline, and tunnel views of the right and left knees from 10- was reviewed and revealed medial compartment arthritis. \par \par Xray of the lumbar spine taken 11/22/2021 demonstrates minimal scoliosis, degenerative disc disease, no spondylolisthesis, there is anterior longitudinal ligament calcification.

## 2021-11-23 ENCOUNTER — APPOINTMENT (OUTPATIENT)
Dept: MRI IMAGING | Facility: CLINIC | Age: 76
End: 2021-11-23
Payer: MEDICARE

## 2021-11-23 PROCEDURE — 73721 MRI JNT OF LWR EXTRE W/O DYE: CPT | Mod: LT,ME

## 2021-11-23 PROCEDURE — G1004: CPT

## 2021-11-23 PROCEDURE — 72148 MRI LUMBAR SPINE W/O DYE: CPT | Mod: ME

## 2021-11-29 ENCOUNTER — APPOINTMENT (OUTPATIENT)
Dept: ULTRASOUND IMAGING | Facility: CLINIC | Age: 76
End: 2021-11-29

## 2021-12-07 ENCOUNTER — APPOINTMENT (OUTPATIENT)
Dept: ORTHOPEDIC SURGERY | Facility: CLINIC | Age: 76
End: 2021-12-07
Payer: MEDICARE

## 2021-12-07 LAB
25(OH)D3 SERPL-MCNC: 22.9 NG/ML
ALBUMIN SERPL ELPH-MCNC: 4.7 G/DL
ALP BLD-CCNC: 73 U/L
ALT SERPL-CCNC: 26 U/L
ANION GAP SERPL CALC-SCNC: 16 MMOL/L
APPEARANCE: CLEAR
AST SERPL-CCNC: 23 U/L
BASOPHILS # BLD AUTO: 0.04 K/UL
BASOPHILS NFR BLD AUTO: 0.7 %
BILIRUB SERPL-MCNC: 0.4 MG/DL
BILIRUBIN URINE: NEGATIVE
BLOOD URINE: NEGATIVE
BUN SERPL-MCNC: 13 MG/DL
CALCIUM SERPL-MCNC: 9.5 MG/DL
CHLORIDE SERPL-SCNC: 102 MMOL/L
CHOLEST SERPL-MCNC: 148 MG/DL
CO2 SERPL-SCNC: 23 MMOL/L
COLOR: NORMAL
CREAT SERPL-MCNC: 1.17 MG/DL
EOSINOPHIL # BLD AUTO: 0.14 K/UL
EOSINOPHIL NFR BLD AUTO: 2.5 %
ESTIMATED AVERAGE GLUCOSE: 140 MG/DL
GLUCOSE QUALITATIVE U: NEGATIVE
GLUCOSE SERPL-MCNC: 109 MG/DL
HBA1C MFR BLD HPLC: 6.5 %
HCT VFR BLD CALC: 42.6 %
HDLC SERPL-MCNC: 40 MG/DL
HGB BLD-MCNC: 13.4 G/DL
IMM GRANULOCYTES NFR BLD AUTO: 0.4 %
KETONES URINE: NEGATIVE
LDLC SERPL CALC-MCNC: 72 MG/DL
LEUKOCYTE ESTERASE URINE: NEGATIVE
LYMPHOCYTES # BLD AUTO: 2.18 K/UL
LYMPHOCYTES NFR BLD AUTO: 38.7 %
MAN DIFF?: NORMAL
MCHC RBC-ENTMCNC: 27.3 PG
MCHC RBC-ENTMCNC: 31.5 GM/DL
MCV RBC AUTO: 86.9 FL
MONOCYTES # BLD AUTO: 0.51 K/UL
MONOCYTES NFR BLD AUTO: 9.1 %
NEUTROPHILS # BLD AUTO: 2.74 K/UL
NEUTROPHILS NFR BLD AUTO: 48.6 %
NITRITE URINE: NEGATIVE
NONHDLC SERPL-MCNC: 109 MG/DL
PH URINE: 6.5
PLATELET # BLD AUTO: 217 K/UL
POTASSIUM SERPL-SCNC: 4.3 MMOL/L
PROT SERPL-MCNC: 7.3 G/DL
PROTEIN URINE: NEGATIVE
PSA FREE FLD-MCNC: 28 %
PSA FREE SERPL-MCNC: 1.31 NG/ML
PSA SERPL-MCNC: 4.71 NG/ML
RBC # BLD: 4.9 M/UL
RBC # FLD: 14.1 %
SODIUM SERPL-SCNC: 141 MMOL/L
SPECIFIC GRAVITY URINE: 1.02
TRIGL SERPL-MCNC: 183 MG/DL
TSH SERPL-ACNC: 5.61 UIU/ML
UROBILINOGEN URINE: NORMAL
WBC # FLD AUTO: 5.63 K/UL

## 2021-12-07 PROCEDURE — 20610 DRAIN/INJ JOINT/BURSA W/O US: CPT | Mod: LT

## 2021-12-07 PROCEDURE — 99213 OFFICE O/P EST LOW 20 MIN: CPT | Mod: 25

## 2021-12-07 NOTE — DISCUSSION/SUMMARY
[de-identified] : The underlying pathophysiology was reviewed with the patient. MRI films were reviewed with the patient. Discussed at length the nature of the patient’s condition. \par \par At this time, I am not advising surgery as I discussed with the patient and his daughter that arthroscopy is not indicated.\par I am recommending a cortisone injection to the left knee at this time. We did additionally discuss gel injections although I told them that I would first try the cortisone prior to proceeding with gel.\par \par The patient wishes to proceed with a cortisone injection today (1). Under sterile precautions, an injection of 5cc 1% lidocaine without epinephrine and 0.5cc Kenalog 40mg, 0.5cc Dexamethasone was administered into the LEFT KNEE. The patient tolerated the procedure well. \par \par He was encouraged on low impact activity such as walking, use of a stationary bike and swimming. He was instructed to avoid any sort of high impact activities.\par Topical analgesics as needed.\par Tylenol Arthritis prn.\par \par All questions answered, understanding verbalized. Patient in agreement with plan of care. Follow up as needed.

## 2021-12-07 NOTE — END OF VISIT
[FreeTextEntry3] : All medical record entries made by the Scribe were at my,  Dr. Bharat Mann MD., direction and personally dictated by me on 12/07/2021. I have personally reviewed the chart and agree that the record accurately reflects my personal performance of the history, physical exam, assessment and plan.

## 2021-12-07 NOTE — PHYSICAL EXAM
[de-identified] : Patient is WDWN, alert, and in no acute distress. Breathing is unlabored. He is grossly oriented to person, place, and time.\par \par Patient ambulates with a cane. \par He is accompanied by his daughter today. \par \par Left Lower Extremity:\par Long leg cylinder cast was removed 9/14/21\par Well healed midline incision. \par Vascular: swelling about the left calf\par Musculoskeletal: left calf tightness and tenderness.  [de-identified] : EXAM:  MR KNEE LT - 11/23/21\par IMPRESSION:\par Full-thickness complex tear of the posterior horn/posterior root junction of the medial meniscus. Osseous stress reaction is seen along the periphery of the medial tibial plateau and at the posterior root attachment. There is medial joint line synovitis.\par \par Tricompartment arthrosis of the knee which is moderate to severe in the patellofemoral compartment and mild to moderate in the medial and lateral tibiofemoral compartment.\par \par Small to moderate knee joint effusion with synovitis. Prominent fluid decompressing from the semimembranosus/medial gastrocnemius bursa along the superficial myofascial plane of the medial gastrocnemius muscle consistent with a ruptured Baker's cyst. Adjacent reactive edema seen within the periphery of the medial gastrocnemius muscle.\par \par Tendinosis of the patellar tendon and the quadriceps insertion.\par \par STEFFANY MCCLOUD MD; Attending Radiologist\par This document has been electronically signed. 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 Mild unchanged retrolisthesis of L1 on L2 and L3 on L4. Unchanged trace degenerative grade 1 anterolisthesis of L4 on L5.\par \par L1/2: There is mild to moderate spinal canal narrowing with effacement of the left lateral recess. There is mild to moderate bilateral neural foraminal narrowing, greater on the right. Findings appear mildly progressed compared to the prior examination.\par \par L4/5: There is severe spinal canal narrowing and severe bilateral neural foraminal narrowing, right greater than left. Findings are grossly unchanged.\par \par STEFFANY MCCLOUD MD; Attending Radiologist\par This document has been electronically signed. Nov 30 2021 10:32AM\par

## 2021-12-07 NOTE — ADDENDUM
[FreeTextEntry1] : I, Adela Yang wrote this note acting as a scribe for Dr. Bharat Mann on Dec 07, 2021.

## 2021-12-07 NOTE — REASON FOR VISIT
[Follow-Up Visit] : a follow-up visit for [FreeTextEntry2] : s/p revision repair of right quadriceps tendon rupture and application of right long leg cylinder cast

## 2021-12-07 NOTE — HISTORY OF PRESENT ILLNESS
[de-identified] : The patient is a 76 year male who returns for follow up evaluation S/P revision repair of right quadriceps tendon rupture and application of right long leg cylinder cast done at [facility]. The surgery was on 07/23/2021. He returned on 10/26/21 noting he was able to fully extend the knee when in a seated position. He noted difficulty with ascending and descending stairs. He reported difficulty with flexion of the knee. He noted he still ambulates with a limp. He has enrolled in PT. He states due to a compensatory gait he was experiencing some lower back pain at last follow up. Pt had a new complaint at his visit on 11/22/21 of left posterior knee pain. He stated that his calf feels tight. The pain radiates down his left leg as well. He occasionally has pain in his left buttock. He has difficulty standing and walking. His left leg feels weak. He returns on 12/7/21 for MRI review. He complains of a pulling sensation through the knee and is unable to fully WB through the lower extremity. His daughter who is present with him states he again notes weakness to the leg. He has not had a cortisone injection to the knee.

## 2022-01-12 NOTE — H&P PST ADULT - MALLAMPATI CLASS
Class I (easy) - visualization of the soft palate, fauces, uvula, and both anterior and posterior pillars No

## 2022-02-22 ENCOUNTER — APPOINTMENT (OUTPATIENT)
Dept: ORTHOPEDIC SURGERY | Facility: CLINIC | Age: 77
End: 2022-02-22
Payer: MEDICARE

## 2022-02-22 VITALS — HEIGHT: 64 IN | BODY MASS INDEX: 25.61 KG/M2 | WEIGHT: 150 LBS

## 2022-02-22 DIAGNOSIS — S76.111D STRAIN OF RIGHT QUADRICEPS MUSCLE, FASCIA AND TENDON, SUBSEQUENT ENCOUNTER: ICD-10-CM

## 2022-02-22 DIAGNOSIS — M17.12 UNILATERAL PRIMARY OSTEOARTHRITIS, LEFT KNEE: ICD-10-CM

## 2022-02-22 PROCEDURE — 99213 OFFICE O/P EST LOW 20 MIN: CPT

## 2022-02-22 NOTE — END OF VISIT
[FreeTextEntry3] : All medical record entries made by the Scribe were at my,  Dr. Bharat Mann MD., direction and personally dictated by me on 02/22/2022. I have personally reviewed the chart and agree that the record accurately reflects my personal performance of the history, physical exam, assessment and plan.

## 2022-02-22 NOTE — PHYSICAL EXAM
[de-identified] : Patient is WDWN, alert, and in no acute distress. Breathing is unlabored. He is grossly oriented to person, place, and time.\par \par Patient ambulates with a cane. \par He is accompanied by his daughter today. \par \par Left Lower Extremity:\par Long leg cylinder cast was removed 9/14/21\par Well healed midline incision. \par Vascular: swelling about the left calf\par Musculoskeletal: left calf tightness and tenderness.  [de-identified] : EXAM:  MR KNEE LT - 11/23/21\par IMPRESSION:\par Full-thickness complex tear of the posterior horn/posterior root junction of the medial meniscus. Osseous stress reaction is seen along the periphery of the medial tibial plateau and at the posterior root attachment. There is medial joint line synovitis.\par \par Tricompartment arthrosis of the knee which is moderate to severe in the patellofemoral compartment and mild to moderate in the medial and lateral tibiofemoral compartment.\par \par Small to moderate knee joint effusion with synovitis. Prominent fluid decompressing from the semimembranosus/medial gastrocnemius bursa along the superficial myofascial plane of the medial gastrocnemius muscle consistent with a ruptured Baker's cyst. Adjacent reactive edema seen within the periphery of the medial gastrocnemius muscle.\par \par Tendinosis of the patellar tendon and the quadriceps insertion.\par \par STEFFANY MCCLOUD MD; Attending Radiologist\par This document has been electronically signed. 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 Mild unchanged retrolisthesis of L1 on L2 and L3 on L4. Unchanged trace degenerative grade 1 anterolisthesis of L4 on L5.\par \par L1/2: There is mild to moderate spinal canal narrowing with effacement of the left lateral recess. There is mild to moderate bilateral neural foraminal narrowing, greater on the right. Findings appear mildly progressed compared to the prior examination.\par \par L4/5: There is severe spinal canal narrowing and severe bilateral neural foraminal narrowing, right greater than left. Findings are grossly unchanged.\par \par STEFFANY MCCLOUD MD; Attending Radiologist\par This document has been electronically signed. Nov 30 2021 10:32AM\par

## 2022-02-22 NOTE — HISTORY OF PRESENT ILLNESS
[de-identified] : The patient is a 76 year male who returns for follow up evaluation S/P revision repair of right quadriceps tendon rupture and application of right long leg cylinder cast done at [facility]. The surgery was on 07/23/2021. He returned on 10/26/21 noting he was able to fully extend the knee when in a seated position. He noted difficulty with ascending and descending stairs. He reported difficulty with flexion of the knee. He noted he still ambulates with a limp. He has enrolled in PT. He states due to a compensatory gait he was experiencing some lower back pain at last follow up. Pt had a new complaint at his visit on 11/22/21 of left posterior knee pain. He stated that his calf feels tight. The pain radiates down his left leg as well. He occasionally has pain in his left buttock. He has difficulty standing and walking. His left leg feels weak. He returned on 12/7/21 for MRI review. He complained of a pulling sensation through the knee and is unable to fully WB through the lower extremity. His daughter who is present with him stated he again noted weakness to the leg. He was given a cortisone injection to the left knee at his last office visit on 12/7/22. He returns on 2/22/22 and reports to be improved. He denies pain but states he still has issue with ascending stairs. He does complain of tightness through the posterior aspect of the knee. He additionally reports pain to the lumbar spine which is worse with ambulating. He is no longer in physical therapy.

## 2022-02-22 NOTE — ADDENDUM
[FreeTextEntry1] : I, Adela Yang wrote this note acting as a scribe for Dr. Bharat Mann on Feb 22, 2022.

## 2022-02-22 NOTE — DISCUSSION/SUMMARY
[de-identified] : The underlying pathophysiology was reviewed with the patient. MRI films were reviewed with the patient. Discussed at length the nature of the patient’s condition. \par \par He was advised to use caution as to not fall. I did encourage him on low impact exercise such as walking as tolerated by his pain. \par The MRI of his lumbar spine from 11/23/21 was once again reviewed in great detail. I did tell him that there is unfortunately not much that can be done with regard to the lumbar spine given his diagnosis of spinal stenosis. I would recommend that he follow up with Dr. Robertson once again as his wife stated he was given an injection by Dr. Robertson years ago which helped relieve his pain.\par Form filled out for temporary handicap parking pass.\par \par All questions answered, understanding verbalized. Patient in agreement with plan of care. Follow up as needed.

## 2022-03-09 ENCOUNTER — APPOINTMENT (OUTPATIENT)
Dept: INTERNAL MEDICINE | Facility: CLINIC | Age: 77
End: 2022-03-09
Payer: MEDICARE

## 2022-03-09 VITALS
HEART RATE: 62 BPM | BODY MASS INDEX: 25.61 KG/M2 | SYSTOLIC BLOOD PRESSURE: 130 MMHG | HEIGHT: 64 IN | OXYGEN SATURATION: 97 % | WEIGHT: 150 LBS | RESPIRATION RATE: 14 BRPM | DIASTOLIC BLOOD PRESSURE: 72 MMHG

## 2022-03-09 PROCEDURE — 99214 OFFICE O/P EST MOD 30 MIN: CPT

## 2022-03-09 NOTE — HISTORY OF PRESENT ILLNESS
[de-identified] : 76 here day for follow up.\par Pt s/p revision of tendon rupture  Difficulty with walking and descending down stairs.- Undergoing PT\par Pt with HTN, Hyperlipidemia, CAD s/p PCI\par No CP, SOB or palpitations.

## 2022-03-09 NOTE — ASSESSMENT
[FreeTextEntry1] : HTN- good control\par CAD- follow up with cardiology\par Hypothyroidism- check labs next visit.\par GERD- continue nexium daily.

## 2022-05-12 ENCOUNTER — APPOINTMENT (OUTPATIENT)
Dept: CARDIOLOGY | Facility: CLINIC | Age: 77
End: 2022-05-12
Payer: MEDICARE

## 2022-05-12 ENCOUNTER — NON-APPOINTMENT (OUTPATIENT)
Age: 77
End: 2022-05-12

## 2022-05-12 VITALS
WEIGHT: 158 LBS | HEIGHT: 64 IN | OXYGEN SATURATION: 97 % | RESPIRATION RATE: 16 BRPM | SYSTOLIC BLOOD PRESSURE: 135 MMHG | BODY MASS INDEX: 26.98 KG/M2 | DIASTOLIC BLOOD PRESSURE: 69 MMHG | HEART RATE: 167 BPM

## 2022-05-12 VITALS — SYSTOLIC BLOOD PRESSURE: 110 MMHG | DIASTOLIC BLOOD PRESSURE: 60 MMHG

## 2022-05-12 PROCEDURE — 93000 ELECTROCARDIOGRAM COMPLETE: CPT

## 2022-05-12 PROCEDURE — 99214 OFFICE O/P EST MOD 30 MIN: CPT

## 2022-05-12 RX ORDER — ASPIRIN 81 MG
81 TABLET, DELAYED RELEASE (ENTERIC COATED) ORAL
Refills: 0 | Status: ACTIVE | COMMUNITY

## 2022-05-12 NOTE — HISTORY OF PRESENT ILLNESS
[FreeTextEntry1] : Patient with history of hypertension and hyperlipidemia, coronary artery disease status post PCI stent 2004,   Prior MI ,  hypothyroid  who had quadriceps tear repair came for follow up , says he is doing well , does have  pain on and off , other wise doing ok   \par \par Patient denies any active cardiac symptoms patients  recent echo showed moderate AS without worsening ,\par \par denies  shortness of breath or dizziness , \par Patient does have occasional GERD symptoms for which he takes PRN medication  m no change in pattern . his blood pressure is controlled \par \par Patient  does  have chronic cervical spine pain with restricted motion , recommended surgery , how ever he does not want it , patient did have epidural injection for lower back for chronic pain which made it better , \par \par Patient blood work  showed  normal renal function ,  mild dyslipidemia ,  low HDL  37 LDL at target level \par \par TSH elevated normal T3 T4 level \par \par \par

## 2022-05-12 NOTE — CARDIOLOGY SUMMARY
[de-identified] : 5/12/22 sinus rhythm  AS infarct IVCD non specific ST  T changes [de-identified] : 6/11/20  medium size distal anterior , apical MI without ischemia  EF 46% [de-identified] : 6/28/21  EF 54% Mild Dd  moderate AS CORRINE 1.3 Sq cm , PG 27 mm hg , MG 16 mm hg , minima AI , mild dilated ascending aorta 4.1  [de-identified] : 3/15/2004 RCA LAD,2/14/2005 LAD LCX

## 2022-05-12 NOTE — PHYSICAL EXAM

## 2022-05-12 NOTE — ASSESSMENT
[FreeTextEntry1] : Patient with above history without active cardiac symptoms who is stable \par \par \par History of old MI and, LAD multiple stents, last one 2005, improved ejection fraction without active cardiac symptoms. Continue his current medications. continue ecotrin forever , carotid doppler , abdominal aorta , \par \par Hyperlipidemia: Mildly elevated triglycerides noncompliant to diet, LDL 58. Continue dietary restriction and medication. Monitor lipid profile and liver function tests\par \par Hypertension: controlled ,advise the patient to follow low-salt diet and home blood pressure monitoring. continue  losartan HCTZ together 100/12.5 mg po daily , continue  norvasc 5 mg po daily ,  \par \par Hypothyroidism: Mildly elevated TSH, normal T3-T4 continue current dose of Synthroid. Continue monitor TSH levels.\par \par moderate to severe   aortic stenosis: Continuing monitor severity of aortic stenosis. currently he does not have symptoms   explained to him to pay attention to his symptoms like chest pain or SOB on exertion follow up echo to  monitor severity of AS \par \par \par follow up after 4 months \par \par \par \par \par

## 2022-06-13 ENCOUNTER — NON-APPOINTMENT (OUTPATIENT)
Age: 77
End: 2022-06-13

## 2022-06-13 ENCOUNTER — APPOINTMENT (OUTPATIENT)
Dept: ORTHOPEDIC SURGERY | Facility: CLINIC | Age: 77
End: 2022-06-13
Payer: MEDICARE

## 2022-06-13 VITALS
BODY MASS INDEX: 26.98 KG/M2 | SYSTOLIC BLOOD PRESSURE: 157 MMHG | HEIGHT: 64 IN | WEIGHT: 158 LBS | HEART RATE: 75 BPM | DIASTOLIC BLOOD PRESSURE: 67 MMHG

## 2022-06-13 PROCEDURE — 99214 OFFICE O/P EST MOD 30 MIN: CPT

## 2022-06-15 ENCOUNTER — OUTPATIENT (OUTPATIENT)
Dept: OUTPATIENT SERVICES | Facility: HOSPITAL | Age: 77
LOS: 1 days | End: 2022-06-15
Payer: MEDICARE

## 2022-06-15 DIAGNOSIS — Z98.890 OTHER SPECIFIED POSTPROCEDURAL STATES: Chronic | ICD-10-CM

## 2022-06-15 DIAGNOSIS — Z20.828 CONTACT WITH AND (SUSPECTED) EXPOSURE TO OTHER VIRAL COMMUNICABLE DISEASES: ICD-10-CM

## 2022-06-15 DIAGNOSIS — S76.119A STRAIN OF UNSPECIFIED QUADRICEPS MUSCLE, FASCIA AND TENDON, INITIAL ENCOUNTER: Chronic | ICD-10-CM

## 2022-06-15 DIAGNOSIS — Z98.49 CATARACT EXTRACTION STATUS, UNSPECIFIED EYE: Chronic | ICD-10-CM

## 2022-06-15 LAB — SARS-COV-2 RNA SPEC QL NAA+PROBE: SIGNIFICANT CHANGE UP

## 2022-06-15 PROCEDURE — U0003: CPT

## 2022-06-15 PROCEDURE — U0005: CPT

## 2022-06-17 ENCOUNTER — OUTPATIENT (OUTPATIENT)
Dept: OUTPATIENT SERVICES | Facility: HOSPITAL | Age: 77
LOS: 1 days | End: 2022-06-17
Payer: MEDICARE

## 2022-06-17 ENCOUNTER — APPOINTMENT (OUTPATIENT)
Dept: ORTHOPEDIC SURGERY | Facility: HOSPITAL | Age: 77
End: 2022-06-17

## 2022-06-17 DIAGNOSIS — S76.119A STRAIN OF UNSPECIFIED QUADRICEPS MUSCLE, FASCIA AND TENDON, INITIAL ENCOUNTER: Chronic | ICD-10-CM

## 2022-06-17 DIAGNOSIS — M48.062 SPINAL STENOSIS, LUMBAR REGION WITH NEUROGENIC CLAUDICATION: ICD-10-CM

## 2022-06-17 DIAGNOSIS — Z98.890 OTHER SPECIFIED POSTPROCEDURAL STATES: Chronic | ICD-10-CM

## 2022-06-17 DIAGNOSIS — Z98.49 CATARACT EXTRACTION STATUS, UNSPECIFIED EYE: Chronic | ICD-10-CM

## 2022-06-17 PROCEDURE — 64483 NJX AA&/STRD TFRM EPI L/S 1: CPT | Mod: LT

## 2022-06-17 PROCEDURE — 64484 NJX AA&/STRD TFRM EPI L/S EA: CPT | Mod: LT

## 2022-06-20 NOTE — HISTORY OF PRESENT ILLNESS
[8] : a current pain level of 8/10 [Walking] : walking [Daily] : ~He/She~ states the symptoms seem to be occuring daily [None] : No relieving factors are noted [de-identified] : Patient is here today due to acute low back into left leg pain going on for the past 6 months no injury just chronic pain. Patient had mri lumbar spine 11/23/21 ordered by Karen Mascorro.\par Pain primarily across back, down left leg into calf\par hx of left L4, L5 ALPESH 2/9/18\par DVT w/u was negative with dopplers\par Had left knee injection by Dr Mann

## 2022-06-20 NOTE — DISCUSSION/SUMMARY
[Medication Risks Reviewed] : Medication risks reviewed [de-identified] : MRI lumbar spine previously performed was independently reviewed by me and findings discussed with the patient and his wife.  He has severe spinal stenosis at L4-5 with severe bilateral neural foraminal narrowing right greater than left.  These findings are grossly unchanged from x-rays from January 22, 2018.  He also has grade 1 spinal listhesis at L4-5.  For his primary left leg pain is not interested in surgical interventions but would like to proceed with an epidural steroid injection.  We will schedule left L4 and L5 transforaminal epidural steroid injection at his earliest convenience.  Prescribed him gabapentin.  He is not interested in any surgical intervention

## 2022-06-20 NOTE — PHYSICAL EXAM
[Apractic] : apractic [Stooped] : stooped [] : Motor: [NL] : Motor strength of the left lower extremity was normal [___/5] : right ([unfilled]/5) [Motor Strength Lower Extremities] : right (5/5) [LE] : Sensory: Intact in bilateral lower extremities [0] : left patella 0 [1+] : left ankle jerk 1+ [DP] : dorsalis pedis 2+ and symmetric bilaterally [Slaughter's Sign] : negative Slaughter's sign [SLR] : negative straight leg raise [Plantar Reflex Right Only] : absent on the right [Plantar Reflex Left Only] : absent on the left [DTR Reflexes Clonus Of Right Ankle (___ Beats)] : absent on the right [DTR Reflexes Clonus Of Left Ankle (___ Beats)] : absent on the left [de-identified] : seen with his wife\par The pt is awake, alert and oriented to self, place and time, is comfortable and in no acute distress. Gait evaluation reveals a narrow based, non-ataxic, non-antalgic gait. Negative Romberg sign noted. Can heel and toe walk without difficulty. Inspection of the neck, back and upper extremities bilaterally reveals no rashes or ecchymotic lesions. There is no obvious abnormal spinal curvature in the sagittal and coronal planes. No crepitus or instability noted with range of motion of bilateral upper extremities. No joint laxity noted in the upper and lower extremities bilaterally. No atrophy or abnormal movements noted in the upper or lower extremities. No tenderness over the cervical, thoracic, lumbar spine or in the paraspinal, or upper and lower extremity musculature. There is no swelling noted in the upper or lower extremities bilaterally. No cervical lymphadenopathy noted anteriorly.\par Cervical spine range of motion is limited by discomfort.  Forward flexion is 30 degrees, extension is 10, left and right lateral patient is 20 degrees some stiffness at end range of motion cervical spine range of motion of both shoulders. Negative Neer's sign and Hawkin's sign bilaterally. Negative Spurling's sign bilaterally. In the lumbar spine the patient can forward flex to his knees and extend 30 degrees without pain\par Negative Babinski sign and no clonus bilaterally in the upper or lower extremities. [de-identified] : healed right knee incision [de-identified] : EXAM: MR SPINE LUMBAR\par \par PROCEDURE DATE: 11/23/2021\par \par INTERPRETATION: CLINICAL INDICATION: Pain along the posterior aspect of the knee.\par \par Multiplanar Multisequence MR of the LUMBAR SPINE\par \par Prior Studies: Prior the lumbar spine from January 22, 2018.\par \par FINDINGS:\par \par ALIGNMENT: Lumbar lordosis is maintained. There is mild retrolisthesis of L1 on L2 and L3 on L4. There is trace degenerative grade 1 anterolisthesis of L4 on L5. Findings are grossly unchanged.\par \par VERTEBRAL BODIES: No acute compression deformity.\par \par DISC SPACES: There is ossification within the L2/L3 disc space, unchanged. There is disc desiccation and mild to moderate disc height loss at L1/L2, L3/L4, and L4/L5.\par \par MARROW: Within normal limits.\par \par SACROILIAC JOINTS: There is mild bilateral sacroiliac joint arthrosis.\par \par CONUS AND CAUDA EQUINA: Conus is normal in morphology terminating at the level of L1/L2. Canal appears narrowed on a congenital basis.\par \par IMAGED ABDOMINAL AND PELVIC STRUCTURES: There is a left-sided renal cyst.\par \par The findings at the individual levels are as follows:\par \par T11/T12: There is bilateral facet arthrosis. There is no spinal canal or neural foraminal narrowing.\par \par T12/L1: There is bilateral facet arthrosis. There is mild bilateral neural foraminal narrowing. There is mild spinal canal narrowing. Findings are grossly unchanged.\par \par L1/2: There is a mild diffuse disc bulge with a superimposed left paracentral disc protrusion. There is bilateral facet arthrosis. Findings result in mild to moderate spinal canal narrowing with effacement of the left lateral recess. There is mild to moderate bilateral neural foraminal narrowing, greater on the right. Findings appear mildly progressed compared to the prior examination.\par \par L2/3: There is posterior osseous ridging. There is bilateral facet arthrosis. Findings result in mild to moderate spinal canal narrowing and bilateral neural foraminal narrowing, moderate on the right and mild to moderate on the left. Findings are grossly unchanged.\par \par L3/4: There is a diffuse disc bulge with posterior osseous ridging. There is bilateral facet arthrosis. Findings result in moderate spinal canal narrowing and moderate to severe bilateral neural foraminal narrowing. Findings appear grossly similar compared to the prior examination.\par \par L4/5: There is uncovering of the posterior disc with a superimposed diffuse disc bulge and left foraminal disc protrusion. There is severe bilateral facet arthrosis and ligamentum flavum hypertrophy. Findings result in severe spinal canal narrowing and severe bilateral neural foraminal narrowing, right greater than left. Findings are grossly unchanged.\par \par L5/S1: There is bilateral facet arthrosis. There is a minimal disc bulge. There is no spinal canal or neural foraminal narrowing. 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 Mild unchanged retrolisthesis of L1 on L2 and L3 on L4. Unchanged trace degenerative grade 1 anterolisthesis of L4 on L5.\par \par L1/2: There is mild to moderate spinal canal narrowing with effacement of the left lateral recess. There is mild to moderate bilateral neural foraminal narrowing, greater on the right. Findings appear mildly progressed compared to the prior examination.\par \par L4/5: There is severe spinal canal narrowing and severe bilateral neural foraminal narrowing, right greater than left. Findings are grossly unchanged.\par \par \par --- End of Report ---\par \par \par STEFFANY MCCLOUD MD; Attending Radiologist\par This document has been electronically signed. Nov 30 2021 10:32AM

## 2022-07-07 ENCOUNTER — APPOINTMENT (OUTPATIENT)
Dept: CARDIOLOGY | Facility: CLINIC | Age: 77
End: 2022-07-07

## 2022-07-07 PROCEDURE — 93306 TTE W/DOPPLER COMPLETE: CPT

## 2022-07-07 PROCEDURE — 93978 VASCULAR STUDY: CPT

## 2022-07-07 PROCEDURE — 93880 EXTRACRANIAL BILAT STUDY: CPT

## 2022-07-11 ENCOUNTER — APPOINTMENT (OUTPATIENT)
Dept: ORTHOPEDIC SURGERY | Facility: CLINIC | Age: 77
End: 2022-07-11

## 2022-07-11 VITALS
WEIGHT: 157 LBS | HEART RATE: 62 BPM | HEIGHT: 64 IN | DIASTOLIC BLOOD PRESSURE: 72 MMHG | BODY MASS INDEX: 26.8 KG/M2 | SYSTOLIC BLOOD PRESSURE: 152 MMHG

## 2022-07-11 PROCEDURE — 99213 OFFICE O/P EST LOW 20 MIN: CPT

## 2022-07-11 RX ORDER — GABAPENTIN 100 MG/1
100 CAPSULE ORAL
Qty: 30 | Refills: 2 | Status: DISCONTINUED | COMMUNITY
Start: 2022-06-13 | End: 2022-07-11

## 2022-07-11 NOTE — HISTORY OF PRESENT ILLNESS
[7] : a current pain level of 7/10 [Walking] : walking [Daily] : ~He/She~ states the symptoms seem to be occuring daily [Rest] : relieved by rest [de-identified] : Patient is here today post lumbar epidural injection 6/17/22 overall patient states only 10% better. Patient stopped the gabapentin wasn’t helping.

## 2022-07-11 NOTE — PHYSICAL EXAM
[Apractic] : apractic [Stooped] : stooped [Slaughter's Sign] : negative Slaughter's sign [SLR] : negative straight leg raise [] : Motor: [NL] : Motor strength of the left lower extremity was normal [___/5] : right ([unfilled]/5) [Motor Strength Lower Extremities] : right (5/5) [LE] : Sensory: Intact in bilateral lower extremities [0] : left patella 0 [1+] : left ankle jerk 1+ [Plantar Reflex Right Only] : absent on the right [Plantar Reflex Left Only] : absent on the left [DTR Reflexes Clonus Of Right Ankle (___ Beats)] : absent on the right [DTR Reflexes Clonus Of Left Ankle (___ Beats)] : absent on the left [DP] : dorsalis pedis 2+ and symmetric bilaterally [de-identified] : seen with his daughter\par The pt is awake, alert and oriented to self, place and time, is comfortable and in no acute distress. Gait evaluation reveals a narrow based, non-ataxic, non-antalgic gait. Negative Romberg sign noted. Can heel and toe walk without difficulty. Inspection of the neck, back and upper extremities bilaterally reveals no rashes or ecchymotic lesions. There is no obvious abnormal spinal curvature in the sagittal and coronal planes. No crepitus or instability noted with range of motion of bilateral upper extremities. No joint laxity noted in the upper and lower extremities bilaterally. No atrophy or abnormal movements noted in the upper or lower extremities. No tenderness over the cervical, thoracic, lumbar spine or in the paraspinal, or upper and lower extremity musculature. There is no swelling noted in the upper or lower extremities bilaterally. No cervical lymphadenopathy noted anteriorly.\par Cervical spine range of motion is limited by discomfort.  Forward flexion is 30 degrees, extension is 10, left and right lateral patient is 20 degrees some stiffness at end range of motion cervical spine range of motion of both shoulders. Negative Neer's sign and Hawkin's sign bilaterally. Negative Spurling's sign bilaterally. In the lumbar spine the patient can forward flex to his knees and extend 30 degrees without pain\par Negative Babinski sign and no clonus bilaterally in the upper or lower extremities. [de-identified] : healed right knee incision

## 2022-07-11 NOTE — DISCUSSION/SUMMARY
[Medication Risks Reviewed] : Medication risks reviewed [de-identified] : MRI lumbar spine performed previously was independently again reviewed by me and findings discussed with the patient and his daughter.  He has severe spinal stenosis at L4-5 and moderate stenosis at L3-4.  This does not appear to have significant on my interpretation.  He does have multilevel anterior osteophytes consistent with his diagnosis of DISH.  We spoke at length regarding more definitive treatment for his condition which will be a laminectomy for severe spinal stenosis L4-5 possibly to include L3-4.  We discussed the option of including fusion as well given his multilevel anterior osteophytes and degenerative changes not sure if a 1 level spinal fusion L4-5 would likely address his back pain complaint.  He understands this and the family will decide whether they would like to do a laminectomy alone at L4-5 versus to include L3-4 and to include a fusion at the L4-5 level as well.\par \par The patient has unrelated left knee meniscus tear for which he is seeing a knee surgeon.  He is considering possible surgical interventions for that and will decide whether to proceed with that first versus denies any spinal pathology.  Information regarding the proposed surgery in the lumbar spine was provided to them.  They will contact me to schedule as needed in the future.

## 2022-07-11 NOTE — REASON FOR VISIT
[Follow-Up Visit] : a follow-up visit for [Back Pain] : back pain [Radiculopathy] : radiculopathy [Spinal Stenosis] : spinal stenosis [Family Member] : family member

## 2022-07-12 ENCOUNTER — NON-APPOINTMENT (OUTPATIENT)
Age: 77
End: 2022-07-12

## 2022-07-25 ENCOUNTER — APPOINTMENT (OUTPATIENT)
Dept: ORTHOPEDIC SURGERY | Facility: CLINIC | Age: 77
End: 2022-07-25

## 2022-07-25 VITALS — BODY MASS INDEX: 26.98 KG/M2 | WEIGHT: 158 LBS | HEIGHT: 64 IN

## 2022-07-25 PROCEDURE — 99214 OFFICE O/P EST MOD 30 MIN: CPT

## 2022-07-25 PROCEDURE — 73564 X-RAY EXAM KNEE 4 OR MORE: CPT | Mod: 50

## 2022-07-25 NOTE — ADDENDUM
[FreeTextEntry1] : I, En Breen, acted solely as a scribe for Dr. Mo Byrd on this date 07/25/2022.\par All medical record entries made by the Scribe were at my, Dr. Mo Byrd, direction and personally dictated by me on 07/25/2022. I have reviewed the chart and agree that the record accurately reflects my personal performance of the history, physical exam, assessment and plan. I have also personally directed, reviewed, and agreed with the chart.

## 2022-07-25 NOTE — DISCUSSION/SUMMARY
[de-identified] : I discussed the underlying pathophysiology of the patient's condition in great detail with the patient and his daughter (Viviane). I went over the patient's x-rays and MRIs with them in great detail. I advised them his problem is coming from his back, not his left knee. I informed the patient that lumbar spinal surgery will be required to provide them long term relief from their symptoms. We had a lengthy discussion about the patient's issues, and talked about the benefits and risks of the procedure. I advised him to go back to Dr. Robertson and proceed with back surgery. All of their questions were answered. They understand and consent to the plan.\par \par FU with Dr. Robertson for back surgery.

## 2022-07-25 NOTE — HISTORY OF PRESENT ILLNESS
[de-identified] : 77 year old male [employer]  presents complaining of left medial-sided knee pain. He denies injury. He denies buckling or swelling. He denies that the pain wakes him up from sleep. His right knee is only occasionally uncomfortable. He had right quadriceps tendon repair in 02/2021 at an outside facility, then then a revision surgery with Dr. Mann in 07/2021. He also has a lower back issue and DISH for which he has had lumbar ESIs, last on 6/17/22 with Dr. Robertson. It helped minimally. Dr. Robertson has recommended surgical intervention. He has difficulty walking even short distances and is constantly stopping to sit down. He notes a pulling on the left medial knee and his left calf becomes hard. \par Pmhx significant for: HTN; CAD (stable); thyroid problems; 4 stents. He is no longer on any blood thinners.\par \par Lumbar spine x-rays from June 2022 with Dr. Robertson reveal calcification of the anterior longitudinal ligament, a slight retrolisthesis of L3 on L4 and severe foraminal stenosis \par \par Left Knee MRI obtained at [facility] on 11/23/2021:\par 1.  Full-thickness complex tear of the posterior horn/posterior root junction of the medial meniscus. Osseous stress reaction is seen along the periphery of the medial tibial plateau and at the posterior root attachment. There is medial joint line synovitis.\par 2.  Tricompartment arthrosis of the knee which is moderate to severe in the patellofemoral compartment and mild to moderate in the medial and lateral tibiofemoral compartment.\par 3.  Small to moderate knee joint effusion with synovitis. Prominent fluid decompressing from the semimembranosus/medial gastrocnemius bursa along the superficial myofascial plane of the medial gastrocnemius muscle consistent with a ruptured Baker's cyst. Adjacent reactive edema seen within the periphery of the medial gastrocnemius muscle.\par 4.  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 Mild unchanged retrolisthesis of L1 on L2 and L3 on L4. Unchanged trace degenerative grade 1 anterolisthesis of L4 on L5.\par * L1/2: There is mild to moderate spinal canal narrowing with effacement of the left lateral recess. There is mild to moderate bilateral neural foraminal narrowing, greater on the right. Findings appear mildly progressed compared to the prior examination.\par * L4/5: There is severe spinal canal narrowing and severe bilateral neural foraminal narrowing, right greater than left. Findings are grossly unchanged.\par

## 2022-07-25 NOTE — CONSULT LETTER
[Dear  ___] : Dear  [unfilled], [Consult Letter:] : I had the pleasure of evaluating your patient, [unfilled]. [Please see my note below.] : Please see my note below. [Consult Closing:] : Thank you very much for allowing me to participate in the care of this patient.  If you have any questions, please do not hesitate to contact me. [Sincerely,] : Sincerely, [FreeTextEntry2] : FLETCHER TUCKER  [FreeTextEntry3] : Mo Byrd M.D.

## 2022-07-25 NOTE — PHYSICAL EXAM
[de-identified] : Constitutional\par o Appearance : well-nourished, well developed, alert, in no acute distress \par Head and Face\par o Head :\par ¦ Inspection : atraumatic, normocephalic\par o Face :\par ¦ Inspection : no visible rash or discoloration\par Respiratory\par o Respiratory Effort: breathing unlabored \par Neurologic\par o Mental Status Examination :\par ¦ Orientation : alert and oriented X 3\par Psychiatric\par o Mood and Affect: mood normal, affect appropriate\par Cardiovascular\par o Observation/Palpation : - no swelling\par Lymphatic\par o Additional Nodes : No palpable lymph nodes present\par \par Lumbosacral Spine\par o Inspection : no visible rash or discoloration\par o Palpation : no paraspinal musculature tenderness\par o Range of Motion : very limited ROM\par o Muscle Strength : paraspinal muscle strength and tone within normal limits\par o Muscle Tone : paraspinal muscle strength and tone within normal limits\par o Tests: straight leg test negative and MARICRUZ test negative bilaterally \par \par Right Lower Extremity\par o Buttock : no tenderness, swelling or deformities \par o Right Hip :\par ¦ Inspection/Palpation : no tenderness, swelling or deformities\par ¦ Range of Motion : full flexion but slight limitation of rotation, no crepitance\par ¦ Stability : joint stability intact\par ¦ Strength : extension, flexion, adduction, abduction, internal rotation and external rotation, 5/5\par \par o Right Knee :\par ¦ Inspection/Palpation : no tenderness to palpation, no swelling\par ¦ Range of Motion : active flexion and extension full and painless, no crepitance\par ¦ Stability : no valgus or varus instability present on provocative testing\par ¦ Strength : flexion and extension 5/5\par ¦ Tests and Signs : negative Anterior Drawer, negative Lachman, negative Kelton\par \par Left Lower Extremity\par o Buttock : no tenderness, swelling or deformities \par o Left Hip :\par ¦ Inspection/Palpation : no tenderness, no swelling or deformities\par ¦ Range of Motion : full and painless in all planes, no crepitance\par ¦ Stability : joint stability intact\par ¦ Strength : extension, flexion, adduction, abduction, internal rotation and external rotation, 5/5\par \par o Left Knee :\par ¦ Inspection/Palpation : no medial or lateral compartment tenderness to palpation, no swelling\par ¦ Range of Motion : active flexion and extension full and painless, no crepitance, good patellofemoral glide \par ¦ Stability : no valgus or varus instability present on provocative testing\par ¦ Strength : flexion and extension 5/5\par ¦ Tests and Signs : negative Anterior Drawer, negative Lachman, negative Kelton\par \par o Peripheral Vascular System :\par ¦ Dorsalis Pedis Artery : pulse 2+ bilaterally\par ¦ Posterior Tibial Artery : pulse 2+ bilaterally \par \par Gait and Station:\par Gait: kyphotic gait, no significant extremity swelling or lymphedema, no foot drop\par \par Radiology Results\par o Right Knee : Standing AP, lateral, tunnel, and skyline views of the knee were obtained and reveal severe lateral and patellofemoral arthritis. \par o Left Knee : Standing AP, lateral, tunnel, and skyline views of the knee were obtained and reveal severe medial and patellofemoral arthritis with calcification of the patellar tendon and traction spurs of the patella.

## 2022-08-09 ENCOUNTER — APPOINTMENT (OUTPATIENT)
Dept: INTERNAL MEDICINE | Facility: CLINIC | Age: 77
End: 2022-08-09

## 2022-08-09 VITALS
OXYGEN SATURATION: 97 % | BODY MASS INDEX: 26.63 KG/M2 | HEIGHT: 64 IN | RESPIRATION RATE: 14 BRPM | HEART RATE: 73 BPM | WEIGHT: 156 LBS | TEMPERATURE: 98.4 F | SYSTOLIC BLOOD PRESSURE: 138 MMHG | DIASTOLIC BLOOD PRESSURE: 66 MMHG

## 2022-08-09 PROCEDURE — 99214 OFFICE O/P EST MOD 30 MIN: CPT

## 2022-08-09 RX ORDER — ESOMEPRAZOLE MAGNESIUM 40 MG/1
40 CAPSULE, DELAYED RELEASE ORAL
Qty: 90 | Refills: 0 | Status: DISCONTINUED | COMMUNITY
Start: 2019-09-04 | End: 2022-08-09

## 2022-08-09 RX ORDER — PANTOPRAZOLE 40 MG/1
40 TABLET, DELAYED RELEASE ORAL
Qty: 90 | Refills: 0 | Status: ACTIVE | COMMUNITY
Start: 2022-08-09 | End: 1900-01-01

## 2022-08-09 NOTE — HISTORY OF PRESENT ILLNESS
[de-identified] : Here today for follow up.\par Doing well \par Going to ortho for back pain\par History of HTN- stable on Meds.

## 2022-08-11 LAB
ALBUMIN SERPL ELPH-MCNC: 4.9 G/DL
ALP BLD-CCNC: 59 U/L
ALT SERPL-CCNC: 25 U/L
ANION GAP SERPL CALC-SCNC: 13 MMOL/L
AST SERPL-CCNC: 22 U/L
BASOPHILS # BLD AUTO: 0.05 K/UL
BASOPHILS NFR BLD AUTO: 0.7 %
BILIRUB SERPL-MCNC: 0.6 MG/DL
BUN SERPL-MCNC: 23 MG/DL
CALCIUM SERPL-MCNC: 10 MG/DL
CHLORIDE SERPL-SCNC: 107 MMOL/L
CHOLEST SERPL-MCNC: 169 MG/DL
CO2 SERPL-SCNC: 28 MMOL/L
CREAT SERPL-MCNC: 1.24 MG/DL
EGFR: 59 ML/MIN/1.73M2
EOSINOPHIL # BLD AUTO: 0.25 K/UL
EOSINOPHIL NFR BLD AUTO: 3.3 %
ESTIMATED AVERAGE GLUCOSE: 134 MG/DL
GLUCOSE SERPL-MCNC: 109 MG/DL
HBA1C MFR BLD HPLC: 6.3 %
HCT VFR BLD CALC: 46.2 %
HDLC SERPL-MCNC: 54 MG/DL
HGB BLD-MCNC: 14.7 G/DL
IMM GRANULOCYTES NFR BLD AUTO: 0.3 %
LDLC SERPL CALC-MCNC: 85 MG/DL
LYMPHOCYTES # BLD AUTO: 2.13 K/UL
LYMPHOCYTES NFR BLD AUTO: 28.4 %
MAN DIFF?: NORMAL
MCHC RBC-ENTMCNC: 29.7 PG
MCHC RBC-ENTMCNC: 31.8 GM/DL
MCV RBC AUTO: 93.3 FL
MONOCYTES # BLD AUTO: 0.6 K/UL
MONOCYTES NFR BLD AUTO: 8 %
NEUTROPHILS # BLD AUTO: 4.44 K/UL
NEUTROPHILS NFR BLD AUTO: 59.3 %
NONHDLC SERPL-MCNC: 114 MG/DL
PLATELET # BLD AUTO: 179 K/UL
POTASSIUM SERPL-SCNC: 5.1 MMOL/L
PROT SERPL-MCNC: 7.6 G/DL
RBC # BLD: 4.95 M/UL
RBC # FLD: 14.6 %
SODIUM SERPL-SCNC: 148 MMOL/L
TRIGL SERPL-MCNC: 147 MG/DL
TSH SERPL-ACNC: 6.43 UIU/ML
WBC # FLD AUTO: 7.49 K/UL

## 2022-08-15 ENCOUNTER — APPOINTMENT (OUTPATIENT)
Dept: ORTHOPEDIC SURGERY | Facility: CLINIC | Age: 77
End: 2022-08-15

## 2022-08-15 VITALS — HEIGHT: 64 IN | BODY MASS INDEX: 26.63 KG/M2 | WEIGHT: 156 LBS

## 2022-08-15 PROCEDURE — 99214 OFFICE O/P EST MOD 30 MIN: CPT

## 2022-08-15 NOTE — REVIEW OF SYSTEMS
[Joint Pain] : joint pain [Joint Stiffness] : joint stiffness [Negative] : Heme/Lymph [FreeTextEntry9] : back pain

## 2022-08-15 NOTE — HISTORY OF PRESENT ILLNESS
[Worsening] : worsening [de-identified] : Pt is a 77 year old male who presents to the office today for an initial evaluation of lower back pain.\par Spinal stenosis \par Pt has had this pain for years \par Prolonged walking and bending  exacerbates the pain; sitting relieves the pain\par Takes Motrin occasionally; very little relief\par Takes  Diclofenac; offers little relief \par Here for second opinion.\par He can only walk half a block.\par He is here with his daughter who is a physician.\par Had LESI June 2022; offered relief \par No numbness/tingling.\par Pain radiates down L leg to calf \par Has not seen a chiropractor\par Has not participated in Acupuncture\par Has done PT in past; complained about it exacerbating his pain\par Has not seen a pain management doctor \par No fever, chills, sweats, nausea/vomiting. No bowel or bladder dysfunction, no recent weight loss or gain. No night pain. This history is in addition to the intake form that I personally reviewed.\par

## 2022-08-15 NOTE — ADDENDUM
[FreeTextEntry1] : This note was written by Charan Calvo on 08/15/2022 acting as scribe for Dr. Jg Louis M.D.\par \par I, Jg Louis MD, have read and attest that all the information, medical decision making and discharge instructions within are true and accurate.

## 2022-08-15 NOTE — DISCUSSION/SUMMARY
[Medication Risks Reviewed] : Medication risks reviewed [Surgical risks reviewed] : Surgical risks reviewed [de-identified] : Neurogenic claudication.\par Diffuse idiopathic skeletal hyperostosis.\par L4-5 severe stenosis.\par L3-4 medium stenosis.\par Lumbar disc degenerative disease.\par Discussed all options. \par We discussed surgery.  We will get\par Risks of surgery include infection, dural tear, nerve root injury, reherniation, future back pain, future leg pain, retained fragment, hematoma, urinary retention, worsening leg symptoms, footdrop, anesthetic risks, blood transfusion risks, positioning pain, visceral and vascular injury, deep vein thrombosis, pulmonary embolus, and death. All risks were explained not exclusive to the ones mentioned alternatives were discussed and all questions were answered the patient agrees and understands the above and is in complete agreement with the plan. \par See pain management, SNRB.\par All options discussed including rest,medicine,home exercise, acupuncture, Chiropractic care, physical therapy, pain management and last resort surgery. All questions were answered, all alternatives discussed and the patient is in complete agreement with the plan. Follow up appointment as instructed. If any issues arise, the patient will call or come in sooner.\par Was daughter agrees with the plan.\par

## 2022-08-15 NOTE — PHYSICAL EXAM
[Normal] : Gait: normal [Stooped] : stooped [Slaughter's Sign] : negative Slaughter's sign [Pronator Drift] : negative pronator drift [SLR] : negative straight leg raise [de-identified] : 5 out of 5 motor strength,sensation is intact and symmetrical full range of motion flexion extension and rotation, no palpatory tenderness full range of motion of hips knees shoulders and elbows (all four extremities), no atrophy, negative straight leg raise, no pathological reflexes, no swelling, normal ambulation, no apparent distress skin intact, no palpable lymph nodes, no upper or lower extremity instability, alert and oriented x 3 and normal mood. Normal finger-to nose test.\par He does have decreased range of motion in his bilateral hips. [de-identified] : XR AP Lat Lumbar 08/15/2022-lumbar disc degenerative disease, diffuse idiopathic skeletal hyperostosis-reviewed with the patient. \par \par I reviewed, interpreted and clinically correlated the following outside imaging studies, \par \par EXAM:  MR SPINE LUMBAR\par \par PROCEDURE DATE:  11/23/2021\par \par INTERPRETATION:  CLINICAL INDICATION: Pain along the posterior aspect of the knee.\par \par Multiplanar Multisequence MR of the LUMBAR SPINE\par \par Prior Studies: Prior the lumbar spine from January 22, 2018.\par \par FINDINGS:\par \par ALIGNMENT: Lumbar lordosis is maintained. There is mild retrolisthesis of L1 on L2 and L3 on L4. There is trace degenerative grade 1 anterolisthesis of L4 on L5. Findings are grossly unchanged.\par \par VERTEBRAL BODIES: No acute compression deformity.\par \par DISC SPACES: There is ossification within the L2/L3 disc space, unchanged. There is disc desiccation and mild to moderate disc height loss at L1/L2, L3/L4, and L4/L5.\par \par MARROW: Within normal limits.\par \par SACROILIAC JOINTS: There is mild bilateral sacroiliac joint arthrosis.\par \par CONUS AND CAUDA EQUINA: Conus is normal in morphology terminating at the level of L1/L2. Canal appears narrowed on a congenital basis.\par \par IMAGED ABDOMINAL AND PELVIC STRUCTURES: There is a left-sided renal cyst.\par \par The findings at the individual levels are as follows:\par \par T11/T12: There is bilateral facet arthrosis. There is no spinal canal or neural foraminal narrowing.\par \par T12/L1: There is bilateral facet arthrosis. There is mild bilateral neural foraminal narrowing. There is mild spinal canal narrowing. Findings are grossly unchanged.\par \par L1/2: There is a mild diffuse disc bulge with a superimposed left paracentral disc protrusion. There is bilateral facet arthrosis. Findings result in mild to moderate spinal canal narrowing with effacement of the left lateral recess. There is mild to moderate bilateral neural foraminal narrowing, greater on the right. Findings appear mildly progressed compared to the prior examination.\par \par L2/3: There is posterior osseous ridging. There is bilateral facet arthrosis. Findings result in mild to moderate spinal canal narrowing and bilateral neural foraminal narrowing, moderate on the right and mild to moderate on the left. Findings are grossly unchanged.\par \par L3/4: There is a diffuse disc bulge with posterior osseous ridging. There is bilateral facet arthrosis. Findings result in moderate spinal canal narrowing and moderate to severe bilateral neural foraminal narrowing. Findings appear grossly similar compared to the prior examination.\par \par L4/5: There is uncovering of the posterior disc with a superimposed diffuse disc bulge and left foraminal disc protrusion. There is severe bilateral facet arthrosis and ligamentum flavum hypertrophy. Findings result in severe spinal canal narrowing and severe bilateral neural foraminal narrowing, right greater than left. Findings are grossly unchanged.\par \par L5/S1: There is bilateral facet arthrosis. There is a minimal disc bulge. There is no spinal canal or neural foraminal narrowing. 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 Mild unchanged retrolisthesis of L1 on L2 and L3 on L4. Unchanged trace degenerative grade 1 anterolisthesis of L4 on L5.\par \par L1/2: There is mild to moderate spinal canal narrowing with effacement of the left lateral recess. There is mild to moderate bilateral neural foraminal narrowing, greater on the right. Findings appear mildly progressed compared to the prior examination.\par \par L4/5: There is severe spinal canal narrowing and severe bilateral neural foraminal narrowing, right greater than left. Findings are grossly unchanged.\par \par \par --- End of Report ---\par \par

## 2022-08-31 ENCOUNTER — APPOINTMENT (OUTPATIENT)
Dept: ORTHOPEDIC SURGERY | Facility: CLINIC | Age: 77
End: 2022-08-31

## 2022-08-31 VITALS
BODY MASS INDEX: 26.63 KG/M2 | WEIGHT: 156 LBS | SYSTOLIC BLOOD PRESSURE: 166 MMHG | HEART RATE: 73 BPM | DIASTOLIC BLOOD PRESSURE: 74 MMHG | HEIGHT: 64 IN

## 2022-08-31 PROCEDURE — 99214 OFFICE O/P EST MOD 30 MIN: CPT | Mod: 57

## 2022-08-31 NOTE — REASON FOR VISIT
[Follow-Up Visit] : a follow-up visit for [Degenerative Joint Disease] : degenerative joint disease [Back Pain] : back pain [Radiculopathy] : radiculopathy [Spinal Stenosis] : spinal stenosis [Spouse] : spouse

## 2022-08-31 NOTE — PHYSICAL EXAM
[Apractic] : apractic [Stooped] : stooped [] : Motor: [NL] : Motor strength of the left lower extremity was normal [___/5] : right ([unfilled]/5) [Motor Strength Lower Extremities] : right (5/5) [LE] : Sensory: Intact in bilateral lower extremities [0] : left patella 0 [1+] : left ankle jerk 1+ [DP] : dorsalis pedis 2+ and symmetric bilaterally [Slaughter's Sign] : negative Slaughter's sign [SLR] : negative straight leg raise [Plantar Reflex Right Only] : absent on the right [Plantar Reflex Left Only] : absent on the left [DTR Reflexes Clonus Of Right Ankle (___ Beats)] : absent on the right [DTR Reflexes Clonus Of Left Ankle (___ Beats)] : absent on the left [de-identified] : seen with his wife, and daughter over the telephone\par The pt is awake, alert and oriented to self, place and time, is comfortable and in no acute distress. Gait evaluation reveals a narrow based, non-ataxic, non-antalgic gait. Negative Romberg sign noted. Can heel and toe walk without difficulty. Inspection of the neck, back and upper extremities bilaterally reveals no rashes or ecchymotic lesions. There is no obvious abnormal spinal curvature in the sagittal and coronal planes. No crepitus or instability noted with range of motion of bilateral upper extremities. No joint laxity noted in the upper and lower extremities bilaterally. No atrophy or abnormal movements noted in the upper or lower extremities. No tenderness over the cervical, thoracic, lumbar spine or in the paraspinal, or upper and lower extremity musculature. There is no swelling noted in the upper or lower extremities bilaterally. No cervical lymphadenopathy noted anteriorly.\par Cervical spine range of motion is limited by discomfort.  Forward flexion is 30 degrees, extension is 10, left and right lateral patient is 20 degrees some stiffness at end range of motion cervical spine range of motion of both shoulders. Negative Neer's sign and Hawkin's sign bilaterally. Negative Spurling's sign bilaterally. In the lumbar spine the patient can forward flex to his knees and extend 30 degrees without pain\par Negative Babinski sign and no clonus bilaterally in the upper or lower extremities. [de-identified] : healed right knee incision

## 2022-08-31 NOTE — DISCUSSION/SUMMARY
[Medication Risks Reviewed] : Medication risks reviewed [de-identified] : MRI lumbar spine performed previously was independently again reviewed by me and findings discussed with the patient, his wife and his daughter.  He has severe spinal stenosis at L4-5 and moderate stenosis at L3-4.   He does have multilevel anterior osteophytes consistent with his diagnosis of DISH.  We spoke at length regarding more definitive treatment for his condition which will be a laminectomy for severe spinal stenosis L4-5 possibly to include L3-4.  We discussed the option of including fusion as well given his multilevel anterior osteophytes and degenerative changes not sure if a 1 level spinal fusion L4-5 would likely address his back pain complaint.  He understands this and the family has decided to proceed with a laminectomy alone to include L4-5 and L3-4.  They understand that a fusion can be considered in a staged manner in the future if he continues to have significant low back pain though given the multilevel ossification and suggestion of DISH not sure if fusion would be the best option for him.\par \par The patient has unrelated left knee meniscus tear for which he is seeing a knee surgeon.  Knee pathology is not significant at this time per patient and will be managed nonsurgically.\par \par They would like to proceed with surgery.\par \par The patient was advised that based upon their clinical presentation and radiographic findings they meet inclusion criteria for spinal surgery to address their spinal pathology.\par The spectrum of treatments for their spinal condition were reviewed in detail. The goals, alternatives, risks and benefits of spinal surgery were reviewed in detail with the patient.  \par Benefits and risks of operative and nonoperative treatment for the patient's pathology were outlined at length with the patient.  Specifically, those risks are understood to be, but not limited to, bleeding, infection, fracture (both during surgery and after surgery), adjacent level disease, failure to heal (significantly increased by smoking), need for further surgery, failure of instrumentation (if used), recurrence of problem and symptoms, neurovascular injury, dural tears or leaks resulting in spinal fluid leakage and requiring additional invasive and non-invasive treatments, need for additional procedures, possible paralysis resulting in loss of use of arms, legs, bowel and bladder function as well as sexual dysfunction, and anesthetic risks including death. \par Available alternatives to surgery were also discussed with the patient. In addition, the patient further understands that there may be indicated need for somatosensory evoked potential monitoring, real-time EMG monitoring, and motor evoked potential monitoring during the procedure along with placement of needle electrodes. A neuromonitoring service will discuss the risks and benefits separately with the patient.\par The patient also understands that having a surgical procedure and being hospitalized carries risks in addition to the ones described above.\par \par The patient was advised that Dr. Robertson operates as a surgical team with his associate Dr. Howard and/or with surgical Physician Assistants (PA)\par \par The patient was advised that they will require a medical preoperative risk evaluation by their PCP. Further medical subspecialty clearances such as cardiac may be indicated if felt needed by their PCP.\par \par The patient was advised he/she may call our office after careful consideration of their treatment options and further consultation with any other physician to begin the process of preoperative planning for elective spinal surgery at a time of their choosing. \par The patient verbalized an understanding of the procedure, its indications, and its common potential complications and attendant risks, and is willing to proceed. No guarantees were made with regard to a complete recovery. We will proceed in a timely manner after obtaining medical clearance.

## 2022-08-31 NOTE — HISTORY OF PRESENT ILLNESS
[Walking] : walking [Daily] : ~He/She~ states the symptoms seem to be occuring daily [Bending] : worsened by bending [Lifting] : worsened by lifting [Prolonged Sitting] : worsened by prolonged sitting [Prolonged Standing] : worsened by prolonged standing [None] : No relieving factors are noted [de-identified] : Patient is here today to discuss having surgery. Patient states lumbar epidural did not help.

## 2022-09-01 ENCOUNTER — APPOINTMENT (OUTPATIENT)
Dept: CARDIOLOGY | Facility: CLINIC | Age: 77
End: 2022-09-01

## 2022-09-01 ENCOUNTER — NON-APPOINTMENT (OUTPATIENT)
Age: 77
End: 2022-09-01

## 2022-09-01 VITALS
HEART RATE: 65 BPM | HEIGHT: 64 IN | DIASTOLIC BLOOD PRESSURE: 70 MMHG | WEIGHT: 156 LBS | OXYGEN SATURATION: 97 % | BODY MASS INDEX: 26.63 KG/M2 | SYSTOLIC BLOOD PRESSURE: 150 MMHG

## 2022-09-01 DIAGNOSIS — Z01.810 ENCOUNTER FOR PREPROCEDURAL CARDIOVASCULAR EXAMINATION: ICD-10-CM

## 2022-09-01 PROCEDURE — 99215 OFFICE O/P EST HI 40 MIN: CPT

## 2022-09-01 PROCEDURE — 93000 ELECTROCARDIOGRAM COMPLETE: CPT | Mod: NC

## 2022-09-01 NOTE — ASSESSMENT
[FreeTextEntry1] : Patient with above history without active cardiac symptoms   who had  no evidence of ischemia on stress test , who is optimal for proposed intermediate risk surgery  . patient is intermediate risk \par \par \par History of old MI and, LAD multiple stents, last one 2005, improved ejection fraction without active cardiac symptoms. Continue his current medications.  hold ecotrin  5 days prior to surgery if really warranted  , resume after the surgery when ok with spine surgeon \par \par Hyperlipidemia: controlled t, LDL 58. Continue dietary restriction and medication. Monitor lipid profile and liver function tests\par \par Hypertension:  minimally elevated  ,advise the patient to follow low-salt diet and home blood pressure monitoring. continue  losartan HCTZ together 100/12.5 mg po daily , increase metoprolol succinate to 100 mg po daily continue  norvasc 5 mg po daily ,  \par \par Hypothyroidism: Mildly elevated TSH, normal T3-T4 continue current dose of Synthroid. Continue monitor TSH levels.\par \par moderate to severe   aortic stenosis: stable on echo exam Continuing monitor severity of aortic stenosis. currently he does not have symptoms   explained to him to pay attention to his symptoms like chest pain or SOB on exertion \par \par \par follow up after 3 months \par \par \par \par \par

## 2022-09-01 NOTE — CARDIOLOGY SUMMARY
[de-identified] : 9/1/22  sinus rhythm  AS infarct IVCD non specific ST  T changes [de-identified] : 6/11/20  medium size distal anterior , apical MI without ischemia  EF 46% [de-identified] :  EF 55-60% Mild DD   moderate AS CORRINE 1.0 Sq cm , PG 27 mm hg , MG 16 mm hg , minima AI , mild dilated ascending aorta 4.1  [de-identified] : 3/15/2004 RCA LAD,2/14/2005 LAD LCX

## 2022-09-01 NOTE — PHYSICAL EXAM

## 2022-09-01 NOTE — HISTORY OF PRESENT ILLNESS
[FreeTextEntry1] : Patient with history of hypertension and hyperlipidemia, coronary artery disease status post PCI stent 2004,   Prior MI ,  hypothyroid  quadriceps tear repair came for pre op cardiac evaluation for lower back surgery   as pain is getting worse , other \par \par Patient denies any active cardiac symptoms patients  recent echo showed moderate AS without worsening ,\par \par denies  shortness of breath or dizziness , \par \par Patient does have occasional GERD symptoms for which he takes PRN medication  m no change in pattern . his \par \par blood pressure is mild elevated today   patient was taking advil ,  non compliant to low salt diet \par \par \par Patient blood work  showed  normal renal function  elevated sodium level ,  mild dyslipidemia ,  low HDL  37 LDL at target level \par \par TSH elevated normal T3 T4 level \par \par \par

## 2022-09-07 ENCOUNTER — OUTPATIENT (OUTPATIENT)
Dept: OUTPATIENT SERVICES | Facility: HOSPITAL | Age: 77
LOS: 1 days | End: 2022-09-07
Payer: MEDICARE

## 2022-09-07 VITALS
DIASTOLIC BLOOD PRESSURE: 66 MMHG | TEMPERATURE: 97 F | HEART RATE: 59 BPM | WEIGHT: 154.98 LBS | HEIGHT: 63 IN | SYSTOLIC BLOOD PRESSURE: 130 MMHG | RESPIRATION RATE: 14 BRPM | OXYGEN SATURATION: 99 %

## 2022-09-07 DIAGNOSIS — M48.062 SPINAL STENOSIS, LUMBAR REGION WITH NEUROGENIC CLAUDICATION: ICD-10-CM

## 2022-09-07 DIAGNOSIS — M51.36 OTHER INTERVERTEBRAL DISC DEGENERATION, LUMBAR REGION: ICD-10-CM

## 2022-09-07 DIAGNOSIS — M54.16 RADICULOPATHY, LUMBAR REGION: ICD-10-CM

## 2022-09-07 DIAGNOSIS — I25.10 ATHEROSCLEROTIC HEART DISEASE OF NATIVE CORONARY ARTERY WITHOUT ANGINA PECTORIS: Chronic | ICD-10-CM

## 2022-09-07 DIAGNOSIS — M48.10 ANKYLOSING HYPEROSTOSIS [FORESTIER], SITE UNSPECIFIED: ICD-10-CM

## 2022-09-07 DIAGNOSIS — Z01.818 ENCOUNTER FOR OTHER PREPROCEDURAL EXAMINATION: ICD-10-CM

## 2022-09-07 DIAGNOSIS — Z98.890 OTHER SPECIFIED POSTPROCEDURAL STATES: Chronic | ICD-10-CM

## 2022-09-07 DIAGNOSIS — S76.119A STRAIN OF UNSPECIFIED QUADRICEPS MUSCLE, FASCIA AND TENDON, INITIAL ENCOUNTER: Chronic | ICD-10-CM

## 2022-09-07 DIAGNOSIS — Z98.49 CATARACT EXTRACTION STATUS, UNSPECIFIED EYE: Chronic | ICD-10-CM

## 2022-09-07 LAB
ALBUMIN SERPL ELPH-MCNC: 4.5 G/DL — SIGNIFICANT CHANGE UP (ref 3.3–5)
ALP SERPL-CCNC: 69 U/L — SIGNIFICANT CHANGE UP (ref 30–120)
ALT FLD-CCNC: 37 U/L DA — SIGNIFICANT CHANGE UP (ref 10–60)
ANION GAP SERPL CALC-SCNC: 8 MMOL/L — SIGNIFICANT CHANGE UP (ref 5–17)
APTT BLD: 28 SEC — SIGNIFICANT CHANGE UP (ref 27.5–35.5)
AST SERPL-CCNC: 24 U/L — SIGNIFICANT CHANGE UP (ref 10–40)
BILIRUB SERPL-MCNC: 0.6 MG/DL — SIGNIFICANT CHANGE UP (ref 0.2–1.2)
BLD GP AB SCN SERPL QL: SIGNIFICANT CHANGE UP
BUN SERPL-MCNC: 14 MG/DL — SIGNIFICANT CHANGE UP (ref 7–23)
CALCIUM SERPL-MCNC: 9.7 MG/DL — SIGNIFICANT CHANGE UP (ref 8.4–10.5)
CHLORIDE SERPL-SCNC: 104 MMOL/L — SIGNIFICANT CHANGE UP (ref 96–108)
CO2 SERPL-SCNC: 34 MMOL/L — HIGH (ref 22–31)
CREAT SERPL-MCNC: 1.31 MG/DL — HIGH (ref 0.5–1.3)
EGFR: 55 ML/MIN/1.73M2 — LOW
GLUCOSE SERPL-MCNC: 112 MG/DL — HIGH (ref 70–99)
HCT VFR BLD CALC: 45.6 % — SIGNIFICANT CHANGE UP (ref 39–50)
HGB BLD-MCNC: 14.9 G/DL — SIGNIFICANT CHANGE UP (ref 13–17)
INR BLD: 1.07 RATIO — SIGNIFICANT CHANGE UP (ref 0.88–1.16)
MCHC RBC-ENTMCNC: 29.5 PG — SIGNIFICANT CHANGE UP (ref 27–34)
MCHC RBC-ENTMCNC: 32.7 GM/DL — SIGNIFICANT CHANGE UP (ref 32–36)
MCV RBC AUTO: 90.3 FL — SIGNIFICANT CHANGE UP (ref 80–100)
NRBC # BLD: 0 /100 WBCS — SIGNIFICANT CHANGE UP (ref 0–0)
PLATELET # BLD AUTO: 157 K/UL — SIGNIFICANT CHANGE UP (ref 150–400)
POTASSIUM SERPL-MCNC: 4.7 MMOL/L — SIGNIFICANT CHANGE UP (ref 3.5–5.3)
POTASSIUM SERPL-SCNC: 4.7 MMOL/L — SIGNIFICANT CHANGE UP (ref 3.5–5.3)
PROT SERPL-MCNC: 8.4 G/DL — HIGH (ref 6–8.3)
PROTHROM AB SERPL-ACNC: 12.3 SEC — SIGNIFICANT CHANGE UP (ref 10.5–13.4)
RBC # BLD: 5.05 M/UL — SIGNIFICANT CHANGE UP (ref 4.2–5.8)
RBC # FLD: 13.5 % — SIGNIFICANT CHANGE UP (ref 10.3–14.5)
SODIUM SERPL-SCNC: 146 MMOL/L — HIGH (ref 135–145)
WBC # BLD: 7.05 K/UL — SIGNIFICANT CHANGE UP (ref 3.8–10.5)
WBC # FLD AUTO: 7.05 K/UL — SIGNIFICANT CHANGE UP (ref 3.8–10.5)

## 2022-09-07 PROCEDURE — G0463: CPT

## 2022-09-07 RX ORDER — OMEGA-3 ACID ETHYL ESTERS 1 G
1 CAPSULE ORAL
Qty: 0 | Refills: 0 | DISCHARGE

## 2022-09-07 RX ORDER — METOPROLOL TARTRATE 50 MG
1 TABLET ORAL
Qty: 0 | Refills: 0 | DISCHARGE

## 2022-09-07 RX ORDER — TIZANIDINE 4 MG/1
2 TABLET ORAL
Qty: 0 | Refills: 0 | DISCHARGE

## 2022-09-07 NOTE — H&P PST ADULT - HISTORY OF PRESENT ILLNESS
75 yo male  78 yo male who presents with 5 year history of  lower back pain  radiating down to left leg . Reports constant pain and increased pain with walking , unable to walk more than 1 block . Tried ALPESH in the past with mild relief in the past , however pain continued . Scheduled for lumbar laminectomy L3-4,L4-5

## 2022-09-07 NOTE — H&P PST ADULT - MUSCULOSKELETAL
details… no joint swelling/no joint erythema/no joint warmth/decreased ROM due to pain no joint swelling/no joint erythema/no joint warmth/decreased ROM due to pain/back exam

## 2022-09-07 NOTE — H&P PST ADULT - ASSESSMENT
75 yo male is scheduled for right quadriceps tendon repair on 7/23/2021 78 yo male is scheduled for lumbar laminectomy on 9/20/22

## 2022-09-07 NOTE — H&P PST ADULT - NSICDXPASTSURGICALHX_GEN_ALL_CORE_FT
PAST SURGICAL HISTORY:  CAD S/P percutaneous coronary angioplasty 3/15/200 x2 stent , RCA, LAD, 2/14/2005 x 2 stent  LAD , LCX    History of cardiac cath 2004,2005    Quadriceps muscle rupture with repair right 2/2021, revision 7/2021    S/P cataract extraction 2018

## 2022-09-07 NOTE — H&P PST ADULT - NSICDXFAMILYHX_GEN_ALL_CORE_FT
FAMILY HISTORY:  Sibling  Still living? No  FH: heart attack, Age at diagnosis: Age Unknown  FH: type 2 diabetes mellitus, Age at diagnosis: Age Unknown

## 2022-09-07 NOTE — H&P PST ADULT - NEGATIVE GENERAL GENITOURINARY SYMPTOMS
no hematuria/no renal colic/no flank pain L/no flank pain R/no urine discoloration/no bladder infections/no incontinence

## 2022-09-07 NOTE — H&P PST ADULT - PROBLEM SELECTOR PLAN 1
scheduled for lumbar laminectomy on 9/20/22\  will obtain medical or cardiac clearance   Follow up with cardiologist on aspirin medication instruction for upcoming surgery.   pre op instruction  covid test on 9/18/22 at 2 pm scheduled for lumbar laminectomy on 9/20/22\  will obtain medical or cardiac clearance   Follow up with cardiologist and surgeon on aspirin medication instruction for upcoming surgery.   pre op instruction  covid test on 9/18/22 at 2 pm scheduled for lumbar laminectomy on 9/20/22\  will obtain medical or cardiac clearance   instructed patient to Follow up with cardiologist and surgeon on aspirin medication instruction for upcoming surgery.   pre op instruction  patient stopped Aspirin on 9/1/22 after seeing Dr Robertson . Notified office about the early stopping of Aspirin   covid test on 9/18/22 at 2 pm

## 2022-09-07 NOTE — H&P PST ADULT - NSICDXPASTMEDICALHX_GEN_ALL_CORE_FT
PAST MEDICAL HISTORY:  CAD (coronary artery disease)     COVID-19 vaccine series completed pfizer 2/22/2021    Hyperlipidemia     Hypertension     Hypothyroidism     Myocardial infarction 2004 with 4 stents    Stented coronary artery X4     PAST MEDICAL HISTORY:  Aortic stenosis     CAD (coronary artery disease)     COVID-19 vaccine series completed pfizer 2/22/2021    Hyperlipidemia     Hypertension     Hypothyroidism     Myocardial infarction 2004 with 4 stents    Stented coronary artery X4

## 2022-09-08 LAB
A1C WITH ESTIMATED AVERAGE GLUCOSE RESULT: 6.2 % — HIGH (ref 4–5.6)
ESTIMATED AVERAGE GLUCOSE: 131 MG/DL — HIGH (ref 68–114)

## 2022-09-13 ENCOUNTER — APPOINTMENT (OUTPATIENT)
Dept: INTERNAL MEDICINE | Facility: CLINIC | Age: 77
End: 2022-09-13

## 2022-09-13 VITALS
HEIGHT: 64 IN | HEART RATE: 72 BPM | SYSTOLIC BLOOD PRESSURE: 130 MMHG | RESPIRATION RATE: 14 BRPM | BODY MASS INDEX: 26.8 KG/M2 | DIASTOLIC BLOOD PRESSURE: 70 MMHG | OXYGEN SATURATION: 96 % | WEIGHT: 157 LBS

## 2022-09-13 PROCEDURE — 99214 OFFICE O/P EST MOD 30 MIN: CPT

## 2022-09-13 NOTE — ASSESSMENT
[Patient Optimized for Surgery] : Patient optimized for surgery [FreeTextEntry4] : History HTN- good control. Continue blood pressure Meds\par Mod AS- followed by cards.\par Cleared by cards.

## 2022-09-13 NOTE — HISTORY OF PRESENT ILLNESS
[Aortic Stenosis] : aortic stenosis [Coronary Artery Disease] : coronary artery disease [Recent Myocardial Infarction] : no recent myocardial infarction [No Pertinent Pulmonary History] : no history of asthma, COPD, sleep apnea, or smoking [FreeTextEntry1] : Laminectomy [FreeTextEntry2] : September 20, 2022 [FreeTextEntry3] : Dr. Robertson [FreeTextEntry4] : History of CAD, HTN, Hyperlipidemia- followed by cards.

## 2022-09-18 ENCOUNTER — OUTPATIENT (OUTPATIENT)
Dept: OUTPATIENT SERVICES | Facility: HOSPITAL | Age: 77
LOS: 1 days | End: 2022-09-18

## 2022-09-18 DIAGNOSIS — S76.119A STRAIN OF UNSPECIFIED QUADRICEPS MUSCLE, FASCIA AND TENDON, INITIAL ENCOUNTER: Chronic | ICD-10-CM

## 2022-09-18 DIAGNOSIS — Z20.828 CONTACT WITH AND (SUSPECTED) EXPOSURE TO OTHER VIRAL COMMUNICABLE DISEASES: ICD-10-CM

## 2022-09-18 DIAGNOSIS — Z98.49 CATARACT EXTRACTION STATUS, UNSPECIFIED EYE: Chronic | ICD-10-CM

## 2022-09-18 DIAGNOSIS — I25.10 ATHEROSCLEROTIC HEART DISEASE OF NATIVE CORONARY ARTERY WITHOUT ANGINA PECTORIS: Chronic | ICD-10-CM

## 2022-09-18 DIAGNOSIS — Z98.890 OTHER SPECIFIED POSTPROCEDURAL STATES: Chronic | ICD-10-CM

## 2022-09-18 LAB — SARS-COV-2 RNA SPEC QL NAA+PROBE: SIGNIFICANT CHANGE UP

## 2022-09-19 PROBLEM — I35.0 NONRHEUMATIC AORTIC (VALVE) STENOSIS: Chronic | Status: ACTIVE | Noted: 2022-09-07

## 2022-09-19 NOTE — ASU PATIENT PROFILE, ADULT - NSICDXPASTMEDICALHX_GEN_ALL_CORE_FT
PAST MEDICAL HISTORY:  Aortic stenosis     CAD (coronary artery disease)     COVID-19 vaccine series completed pfizer 2/22/2021    Hyperlipidemia     Hypertension     Hypothyroidism     Myocardial infarction 2004 with 4 stents    Stented coronary artery X4

## 2022-09-19 NOTE — ASU PATIENT PROFILE, ADULT - PRESSURE ULCER(S)
Form completed, placed in fax bin.     Dr. JIM   
Form was reviewed and signed by Vanesa Machuca MD. Form was faxed and placed in the file cabinet on 7/29/2021.        
[facility] - Physician Order and POC received for review and completion by PCP,   placed paperwork in PCP's mailbox.     Facility: [facility]- Physician Order & Plan of Care    Phone :[phone]  Fax:[phone]    Thank you,  ALESSANDRO SmithR  
no

## 2022-09-19 NOTE — PROVIDER CONTACT NOTE (OTHER) - ASSESSMENT
The Spine Pre-Operative Education packet was given to the patient on 8/3/22. The patient and NP reviewed the information included in the packet. All his questions were answered and he gave a clear understanding of the instructions. He was advised to call the office at any time with further questions or concerns. The Spine Pre-Operative Education packet was given to the patient on 8/31/22. The patient and NP reviewed the information included in the packet. All his questions were answered and he gave a clear understanding of the instructions. He was advised to call the office at any time with further questions or concerns.

## 2022-09-20 ENCOUNTER — INPATIENT (INPATIENT)
Facility: HOSPITAL | Age: 77
LOS: 0 days | Discharge: HOME | DRG: 517 | End: 2022-09-21
Attending: ORTHOPAEDIC SURGERY | Admitting: ORTHOPAEDIC SURGERY
Payer: COMMERCIAL

## 2022-09-20 ENCOUNTER — APPOINTMENT (OUTPATIENT)
Dept: ORTHOPEDIC SURGERY | Facility: HOSPITAL | Age: 77
End: 2022-09-20

## 2022-09-20 ENCOUNTER — TRANSCRIPTION ENCOUNTER (OUTPATIENT)
Age: 77
End: 2022-09-20

## 2022-09-20 ENCOUNTER — RESULT REVIEW (OUTPATIENT)
Age: 77
End: 2022-09-20

## 2022-09-20 VITALS
OXYGEN SATURATION: 100 % | WEIGHT: 152.34 LBS | RESPIRATION RATE: 18 BRPM | HEART RATE: 72 BPM | SYSTOLIC BLOOD PRESSURE: 161 MMHG | HEIGHT: 63 IN | DIASTOLIC BLOOD PRESSURE: 87 MMHG | TEMPERATURE: 98 F

## 2022-09-20 DIAGNOSIS — I10 ESSENTIAL (PRIMARY) HYPERTENSION: ICD-10-CM

## 2022-09-20 DIAGNOSIS — Z98.49 CATARACT EXTRACTION STATUS, UNSPECIFIED EYE: Chronic | ICD-10-CM

## 2022-09-20 DIAGNOSIS — M48.061 SPINAL STENOSIS, LUMBAR REGION WITHOUT NEUROGENIC CLAUDICATION: ICD-10-CM

## 2022-09-20 DIAGNOSIS — Z98.890 OTHER SPECIFIED POSTPROCEDURAL STATES: Chronic | ICD-10-CM

## 2022-09-20 DIAGNOSIS — M48.062 SPINAL STENOSIS, LUMBAR REGION WITH NEUROGENIC CLAUDICATION: ICD-10-CM

## 2022-09-20 DIAGNOSIS — I25.10 ATHEROSCLEROTIC HEART DISEASE OF NATIVE CORONARY ARTERY WITHOUT ANGINA PECTORIS: ICD-10-CM

## 2022-09-20 DIAGNOSIS — I25.10 ATHEROSCLEROTIC HEART DISEASE OF NATIVE CORONARY ARTERY WITHOUT ANGINA PECTORIS: Chronic | ICD-10-CM

## 2022-09-20 DIAGNOSIS — Z29.9 ENCOUNTER FOR PROPHYLACTIC MEASURES, UNSPECIFIED: ICD-10-CM

## 2022-09-20 DIAGNOSIS — E78.5 HYPERLIPIDEMIA, UNSPECIFIED: ICD-10-CM

## 2022-09-20 DIAGNOSIS — E03.9 HYPOTHYROIDISM, UNSPECIFIED: ICD-10-CM

## 2022-09-20 DIAGNOSIS — S76.119A STRAIN OF UNSPECIFIED QUADRICEPS MUSCLE, FASCIA AND TENDON, INITIAL ENCOUNTER: Chronic | ICD-10-CM

## 2022-09-20 PROCEDURE — 63047 LAM FACETEC & FORAMOT LUMBAR: CPT

## 2022-09-20 PROCEDURE — 99222 1ST HOSP IP/OBS MODERATE 55: CPT

## 2022-09-20 PROCEDURE — 88311 DECALCIFY TISSUE: CPT | Mod: 26

## 2022-09-20 PROCEDURE — 63048 LAM FACETEC &FORAMOT EA ADDL: CPT

## 2022-09-20 PROCEDURE — 88304 TISSUE EXAM BY PATHOLOGIST: CPT | Mod: 26

## 2022-09-20 DEVICE — BONE WAX 2.5GM: Type: IMPLANTABLE DEVICE | Status: FUNCTIONAL

## 2022-09-20 DEVICE — SURGIFOAM PAD SZ 100: Type: IMPLANTABLE DEVICE | Status: FUNCTIONAL

## 2022-09-20 DEVICE — KIT SURGIFLO HEMOSTATIC MATRIX: Type: IMPLANTABLE DEVICE | Status: FUNCTIONAL

## 2022-09-20 RX ORDER — HYDROMORPHONE HYDROCHLORIDE 2 MG/ML
2 INJECTION INTRAMUSCULAR; INTRAVENOUS; SUBCUTANEOUS
Refills: 0 | Status: DISCONTINUED | OUTPATIENT
Start: 2022-09-20 | End: 2022-09-21

## 2022-09-20 RX ORDER — MAGNESIUM HYDROXIDE 400 MG/1
30 TABLET, CHEWABLE ORAL EVERY 12 HOURS
Refills: 0 | Status: DISCONTINUED | OUTPATIENT
Start: 2022-09-20 | End: 2022-09-21

## 2022-09-20 RX ORDER — HYDROMORPHONE HYDROCHLORIDE 2 MG/ML
0.5 INJECTION INTRAMUSCULAR; INTRAVENOUS; SUBCUTANEOUS
Refills: 0 | Status: DISCONTINUED | OUTPATIENT
Start: 2022-09-20 | End: 2022-09-20

## 2022-09-20 RX ORDER — CEFAZOLIN SODIUM 1 G
2000 VIAL (EA) INJECTION EVERY 8 HOURS
Refills: 0 | Status: COMPLETED | OUTPATIENT
Start: 2022-09-20 | End: 2022-09-21

## 2022-09-20 RX ORDER — ATORVASTATIN CALCIUM 80 MG/1
40 TABLET, FILM COATED ORAL AT BEDTIME
Refills: 0 | Status: DISCONTINUED | OUTPATIENT
Start: 2022-09-20 | End: 2022-09-21

## 2022-09-20 RX ORDER — ACETAMINOPHEN 500 MG
1000 TABLET ORAL EVERY 8 HOURS
Refills: 0 | Status: DISCONTINUED | OUTPATIENT
Start: 2022-09-21 | End: 2022-09-21

## 2022-09-20 RX ORDER — HYDROMORPHONE HYDROCHLORIDE 2 MG/ML
4 INJECTION INTRAMUSCULAR; INTRAVENOUS; SUBCUTANEOUS
Refills: 0 | Status: DISCONTINUED | OUTPATIENT
Start: 2022-09-20 | End: 2022-09-21

## 2022-09-20 RX ORDER — LEVOTHYROXINE SODIUM 125 MCG
50 TABLET ORAL DAILY
Refills: 0 | Status: DISCONTINUED | OUTPATIENT
Start: 2022-09-20 | End: 2022-09-21

## 2022-09-20 RX ORDER — LOSARTAN POTASSIUM 100 MG/1
100 TABLET, FILM COATED ORAL DAILY
Refills: 0 | Status: DISCONTINUED | OUTPATIENT
Start: 2022-09-22 | End: 2022-09-21

## 2022-09-20 RX ORDER — HYDROMORPHONE HYDROCHLORIDE 2 MG/ML
0.5 INJECTION INTRAMUSCULAR; INTRAVENOUS; SUBCUTANEOUS
Refills: 0 | Status: DISCONTINUED | OUTPATIENT
Start: 2022-09-20 | End: 2022-09-21

## 2022-09-20 RX ORDER — CYCLOBENZAPRINE HYDROCHLORIDE 10 MG/1
10 TABLET, FILM COATED ORAL EVERY 8 HOURS
Refills: 0 | Status: DISCONTINUED | OUTPATIENT
Start: 2022-09-20 | End: 2022-09-21

## 2022-09-20 RX ORDER — SODIUM CHLORIDE 9 MG/ML
1000 INJECTION, SOLUTION INTRAVENOUS
Refills: 0 | Status: DISCONTINUED | OUTPATIENT
Start: 2022-09-20 | End: 2022-09-20

## 2022-09-20 RX ORDER — AMLODIPINE BESYLATE 2.5 MG/1
5 TABLET ORAL DAILY
Refills: 0 | Status: DISCONTINUED | OUTPATIENT
Start: 2022-09-22 | End: 2022-09-21

## 2022-09-20 RX ORDER — POLYETHYLENE GLYCOL 3350 17 G/17G
17 POWDER, FOR SOLUTION ORAL AT BEDTIME
Refills: 0 | Status: DISCONTINUED | OUTPATIENT
Start: 2022-09-20 | End: 2022-09-21

## 2022-09-20 RX ORDER — ACETAMINOPHEN 500 MG
1000 TABLET ORAL ONCE
Refills: 0 | Status: COMPLETED | OUTPATIENT
Start: 2022-09-20 | End: 2022-09-20

## 2022-09-20 RX ORDER — HYDROMORPHONE HYDROCHLORIDE 2 MG/ML
0.25 INJECTION INTRAMUSCULAR; INTRAVENOUS; SUBCUTANEOUS
Refills: 0 | Status: DISCONTINUED | OUTPATIENT
Start: 2022-09-20 | End: 2022-09-20

## 2022-09-20 RX ORDER — SENNA PLUS 8.6 MG/1
2 TABLET ORAL AT BEDTIME
Refills: 0 | Status: DISCONTINUED | OUTPATIENT
Start: 2022-09-20 | End: 2022-09-21

## 2022-09-20 RX ORDER — ONDANSETRON 8 MG/1
4 TABLET, FILM COATED ORAL EVERY 6 HOURS
Refills: 0 | Status: DISCONTINUED | OUTPATIENT
Start: 2022-09-20 | End: 2022-09-21

## 2022-09-20 RX ORDER — CEFAZOLIN SODIUM 1 G
2000 VIAL (EA) INJECTION ONCE
Refills: 0 | Status: COMPLETED | OUTPATIENT
Start: 2022-09-20 | End: 2022-09-20

## 2022-09-20 RX ORDER — SODIUM CHLORIDE 9 MG/ML
1000 INJECTION, SOLUTION INTRAVENOUS
Refills: 0 | Status: DISCONTINUED | OUTPATIENT
Start: 2022-09-20 | End: 2022-09-21

## 2022-09-20 RX ORDER — ONDANSETRON 8 MG/1
4 TABLET, FILM COATED ORAL ONCE
Refills: 0 | Status: DISCONTINUED | OUTPATIENT
Start: 2022-09-20 | End: 2022-09-20

## 2022-09-20 RX ORDER — TRANEXAMIC ACID 100 MG/ML
2000 INJECTION, SOLUTION INTRAVENOUS ONCE
Refills: 0 | Status: COMPLETED | OUTPATIENT
Start: 2022-09-20 | End: 2022-09-20

## 2022-09-20 RX ORDER — ASPIRIN/CALCIUM CARB/MAGNESIUM 324 MG
81 TABLET ORAL DAILY
Refills: 0 | Status: DISCONTINUED | OUTPATIENT
Start: 2022-09-22 | End: 2022-09-21

## 2022-09-20 RX ORDER — METOPROLOL TARTRATE 50 MG
100 TABLET ORAL DAILY
Refills: 0 | Status: DISCONTINUED | OUTPATIENT
Start: 2022-09-20 | End: 2022-09-21

## 2022-09-20 RX ORDER — APREPITANT 80 MG/1
40 CAPSULE ORAL ONCE
Refills: 0 | Status: COMPLETED | OUTPATIENT
Start: 2022-09-20 | End: 2022-09-20

## 2022-09-20 RX ADMIN — HYDROMORPHONE HYDROCHLORIDE 2 MILLIGRAM(S): 2 INJECTION INTRAMUSCULAR; INTRAVENOUS; SUBCUTANEOUS at 21:35

## 2022-09-20 RX ADMIN — Medication 400 MILLIGRAM(S): at 23:50

## 2022-09-20 RX ADMIN — SODIUM CHLORIDE 100 MILLILITER(S): 9 INJECTION, SOLUTION INTRAVENOUS at 21:35

## 2022-09-20 RX ADMIN — SODIUM CHLORIDE 75 MILLILITER(S): 9 INJECTION, SOLUTION INTRAVENOUS at 18:16

## 2022-09-20 RX ADMIN — Medication 100 MILLIGRAM(S): at 23:49

## 2022-09-20 RX ADMIN — ATORVASTATIN CALCIUM 40 MILLIGRAM(S): 80 TABLET, FILM COATED ORAL at 21:35

## 2022-09-20 RX ADMIN — APREPITANT 40 MILLIGRAM(S): 80 CAPSULE ORAL at 11:41

## 2022-09-20 RX ADMIN — HYDROMORPHONE HYDROCHLORIDE 2 MILLIGRAM(S): 2 INJECTION INTRAMUSCULAR; INTRAVENOUS; SUBCUTANEOUS at 22:25

## 2022-09-20 NOTE — CONSULT NOTE ADULT - PROBLEM SELECTOR RECOMMENDATION 6
Was started on B/L intermittent pneumatic compression device for mechanical DVT prophylaxis, pharmacological DVT prophylaxis as per orthopedic surgery team.

## 2022-09-20 NOTE — CONSULT NOTE ADULT - ASSESSMENT
Asymptomatic post-op 78 y/o M with PMH of HTN, Dyslipidemia, AS, CAD s/p MI s/p PCI, Hypothyroidism, BPH, and GERD.

## 2022-09-20 NOTE — CONSULT NOTE ADULT - PROBLEM SELECTOR RECOMMENDATION 5
Balloon inserted to left main and left main. on Levothyroxine 50 mcg PO daily, continue at same dose.

## 2022-09-20 NOTE — CONSULT NOTE ADULT - PROBLEM SELECTOR RECOMMENDATION 9
s/p laminectomy, asymptomatic post-op, pain control, bowel regimen, IVF hydration, I & O monitoring, and incentive spirometry. PT & OT consults and mobilization as per orthopedic surgery team.

## 2022-09-20 NOTE — CONSULT NOTE ADULT - CONSULT REQUESTED BY NAME
[facility] Brief Operative Note    Pre-operative diagnosis: STATUS POST BILATERAL MASTECTOMY   Post-operative diagnosis deformity reconstructed breasts     Procedure: Procedure(s):  REVISION left silicone BREAST RECONSTRUCTION AND  FAT GRAFTING FROM AXILLA TO BILATERAL BREASTS, right mastectomy scar revision - Wound Class: I-Clean     Surgeon(s): Surgeon(s) and Role:  Panel 1:     * Jenna Vazquez MD - Primary    Panel 2:     * Jenna Vazquez MD - Primary   Estimated blood loss: 10 mL    Specimens: * No specimens in log *   Findings: See op note.        Dr. Robertson.

## 2022-09-20 NOTE — CONSULT NOTE ADULT - SUBJECTIVE AND OBJECTIVE BOX
This is a 78 y/o M with PMH of HTN, Dyslipidemia, AS, CAD s/p MI s/p PCI, Hypothyroidism, BPH, and GERD who parented for lumbar laminectomy. Patient was suffering of back pain with neurologic caudications This is a 76 y/o M with PMH of HTN, Dyslipidemia, AS, CAD s/p MI s/p PCI, Hypothyroidism, BPH, and GERD who parented for lumbar laminectomy. Patient was suffering of back pain with neurologic claudications over This is a 76 y/o M with PMH of HTN, Dyslipidemia, AS, CAD s/p MI s/p PCI, Hypothyroidism, BPH, and GERD who parented for lumbar laminectomy. Patient was suffering of back pain with neurologic claudications over the past 5 years, pain was moderate radiating down his left thigh & leg, worse with walking, had epidural steroid injections before with little help, seen by ortho, and was scheduled for laminectomy today. Routine post-op medical evaluation was requested. Patient denies any chest pain, SOB, palpitations, dizziness, headache, paranesthesias, or focal muscle weakness.          ALLERGIES:     NKDA          PAST MEDICAL & SURGICAL HISTORY:    CAD (coronary artery disease)  Myocardial infarction 2004 with 4 stents  Hypertension  Hypothyroidism  Hyperlipidemia  Stented coronary artery X4  COVID-19 vaccine series completed Pfizer 2/22/2021  Aortic stenosis  History of cardiac cath 2004,2005  S/P cataract extraction 2018  Quadriceps muscle rupture with repair right 2/2021, revision 7/2021  CAD S/P percutaneous coronary angioplasty 3/15/200 x2 stent , RCA, LAD, 2/14/2005 x 2 stent  LAD , LCX          SOCIAL HISTORY:     Jf, lives with family, non smoker, no ETOH or drug abuse.          FAMILY HISTORY:    FH: type 2 diabetes mellitus (Sibling)  FH: heart attack (Sibling)  brother          MEDICATIONS  (STANDING):    acetaminophen   IVPB .. 1000 milliGRAM(s) IV Intermittent once  amLODIPine   Tablet 5 milliGRAM(s) Oral daily  atorvastatin 40 milliGRAM(s) Oral at bedtime  ceFAZolin   IVPB 2000 milliGRAM(s) IV Intermittent every 8 hours  lactated ringers. 1000 milliLiter(s) (100 mL/Hr) IV Continuous <Continuous>  levothyroxine 50 MICROGram(s) Oral daily  metoprolol succinate  milliGRAM(s) Oral daily  polyethylene glycol 3350 17 Gram(s) Oral at bedtime  senna 2 Tablet(s) Oral at bedtime          MEDICATIONS  (PRN):    aluminum hydroxide/magnesium hydroxide/simethicone Suspension 30 milliLiter(s) Oral every 12 hours PRN Indigestion  cyclobenzaprine 10 milliGRAM(s) Oral every 8 hours PRN Muscle Spasm  HYDROmorphone   Tablet 2 milliGRAM(s) Oral every 3 hours PRN Moderate Pain (4 - 6)  HYDROmorphone   Tablet 4 milliGRAM(s) Oral every 3 hours PRN Severe Pain (7 - 10)  HYDROmorphone  Injectable 0.5 milliGRAM(s) IV Push every 3 hours PRN Breakthrough pain  magnesium hydroxide Suspension 30 milliLiter(s) Oral every 12 hours PRN Constipation  ondansetron Injectable 4 milliGRAM(s) IV Push every 6 hours PRN Nausea and/or Vomiting          HOME MEDICATIONS:    amLODIPine 5 mg oral tablet: 1 tab(s) orally once a day (20 Sep 2022 11:39)  aspirin 81 mg oral delayed release tablet: 1 tab(s) orally once a day  (20 Sep 2022 11:39)  Crestor 10 mg oral tablet: 1 tab(s) orally once a day (20 Sep 2022 11:39)  losartan-hydrochlorothiazide 100mg-12.5mg oral tablet: 1 tab(s) orally once a day (20 Sep 2022 11:39)  metoprolol succinate 100 mg oral capsule, extended release: 1 cap(s) orally once a day (20 Sep 2022 11:39)  Synthroid 50 mcg (0.05 mg) oral tablet: 1 tab(s) orally once a day (20 Sep 2022 11:39)        REVIEW OF SYSTEMS:    CONSTITUTIONAL: No fever or chills.  EYES: No eye pain, visual disturbances, or discharge.  ENMT:  No difficulty hearing, vertigo, sinus or throat pain.  NECK: No pain or stiffness.	  RESPIRATORY: No cough, wheezing, or hemoptysis; No shortness of breath.  CARDIOVASCULAR: No chest pain, palpitations, dizziness, or leg swelling.  GASTROINTESTINAL: No abdominal pain, no nausea, vomiting, or hematemesis; No diarrhea or Change in bowel habits. No melena or hematochezia.  GENITOURINARY: No dysuria, frequency, hematuria, or incontinence.  NEUROLOGICAL: No headaches, focal muscle weakness, numbness, or tremors.  SKIN: No itching, burning or rashes.  MUSCULOSKELETAL: No joint swelling or pain.  PSYCHIATRIC: No depression, anxiety, or agitation.  HEME/LYMPH: No easy bruising, bleeding gums, or nose bleed.  ALLERGY AND IMMUNOLOGIC: No hives or eczema.          VITAL SIGNS:  Vital Signs Last 24 Hrs  T(C): 36.8 (20 Sep 2022 22:51), Max: 36.8 (20 Sep 2022 20:51)  T(F): 98.2 (20 Sep 2022 22:51), Max: 98.2 (20 Sep 2022 20:51)  HR: 78 (20 Sep 2022 22:51) (67 - 79)  BP: 116/68 (20 Sep 2022 22:51) (116/44 - 161/87)  BP(mean): --  RR: 18 (20 Sep 2022 22:51) (14 - 20)  SpO2: 94% (20 Sep 2022 22:51) (94% - 100%)    Parameters below as of 20 Sep 2022 22:51  Patient On (Oxygen Delivery Method): room air      PHYSICAL EXAM:  		  GENERAL: NAD, well-groomed, well-developed.  HEAD:  Atraumatic, Norm cephalic.  EYES: PERRLA, conjunctiva clear.  ENMT: no nasal discharge, MMM.   NECK: Supple, No JVD.  NERVOUS SYSTEM:  Alert & oriented X3, neurologically intact grossly.  CHEST/LUNG: Good air entry B/L, (+) left basal rales, no rhonchi, or wheezing.  HEART: Normal S1 & S2, grade III/VI [place] max over cardiac base propagating to apex & B/L carotids, no extra sounds.  ABDOMEN: Soft, non-tender, mildly distended; bowel sounds present, no palpable masses or organomegaly.  EXTREMITIES:  No clubbing, cyanosis, or edema.  VASCULAR: 2+ radial, brachial pulses B/L.  SKIN: No rashes or lesions.  PSYCH: normal affect & behavior.        LABs:    COVID-19 PCR: NotDetec (18 Sep 2022 13:55)  COVID-19 PCR: NotDetec (15 Shahram 2022 08:52)      EKG:    As per my review shows SR at 70/min, normal NE & QTc intervals, old septal infarct, normal QRS voltage, duration, and axis (zero), with normal transition, no ST-T abnormality.  Was performed on Sept 1st, 2022 for PST.  -

## 2022-09-21 ENCOUNTER — TRANSCRIPTION ENCOUNTER (OUTPATIENT)
Age: 77
End: 2022-09-21

## 2022-09-21 VITALS
TEMPERATURE: 98 F | HEART RATE: 80 BPM | DIASTOLIC BLOOD PRESSURE: 68 MMHG | RESPIRATION RATE: 17 BRPM | OXYGEN SATURATION: 96 % | SYSTOLIC BLOOD PRESSURE: 138 MMHG

## 2022-09-21 LAB
ANION GAP SERPL CALC-SCNC: 7 MMOL/L — SIGNIFICANT CHANGE UP (ref 5–17)
BASOPHILS # BLD AUTO: 0.03 K/UL — SIGNIFICANT CHANGE UP (ref 0–0.2)
BASOPHILS NFR BLD AUTO: 0.2 % — SIGNIFICANT CHANGE UP (ref 0–2)
BUN SERPL-MCNC: 16 MG/DL — SIGNIFICANT CHANGE UP (ref 7–23)
CALCIUM SERPL-MCNC: 8.8 MG/DL — SIGNIFICANT CHANGE UP (ref 8.4–10.5)
CHLORIDE SERPL-SCNC: 101 MMOL/L — SIGNIFICANT CHANGE UP (ref 96–108)
CO2 SERPL-SCNC: 28 MMOL/L — SIGNIFICANT CHANGE UP (ref 22–31)
CREAT SERPL-MCNC: 1.25 MG/DL — SIGNIFICANT CHANGE UP (ref 0.5–1.3)
EGFR: 59 ML/MIN/1.73M2 — LOW
EOSINOPHIL # BLD AUTO: 0 K/UL — SIGNIFICANT CHANGE UP (ref 0–0.5)
EOSINOPHIL NFR BLD AUTO: 0 % — SIGNIFICANT CHANGE UP (ref 0–6)
GLUCOSE SERPL-MCNC: 178 MG/DL — HIGH (ref 70–99)
HCT VFR BLD CALC: 42 % — SIGNIFICANT CHANGE UP (ref 39–50)
HGB BLD-MCNC: 14.1 G/DL — SIGNIFICANT CHANGE UP (ref 13–17)
IMM GRANULOCYTES NFR BLD AUTO: 0.6 % — SIGNIFICANT CHANGE UP (ref 0–0.9)
LYMPHOCYTES # BLD AUTO: 0.97 K/UL — LOW (ref 1–3.3)
LYMPHOCYTES # BLD AUTO: 6.1 % — LOW (ref 13–44)
MCHC RBC-ENTMCNC: 29.3 PG — SIGNIFICANT CHANGE UP (ref 27–34)
MCHC RBC-ENTMCNC: 33.6 GM/DL — SIGNIFICANT CHANGE UP (ref 32–36)
MCV RBC AUTO: 87.1 FL — SIGNIFICANT CHANGE UP (ref 80–100)
MONOCYTES # BLD AUTO: 0.54 K/UL — SIGNIFICANT CHANGE UP (ref 0–0.9)
MONOCYTES NFR BLD AUTO: 3.4 % — SIGNIFICANT CHANGE UP (ref 2–14)
NEUTROPHILS # BLD AUTO: 14.22 K/UL — HIGH (ref 1.8–7.4)
NEUTROPHILS NFR BLD AUTO: 89.7 % — HIGH (ref 43–77)
NRBC # BLD: 0 /100 WBCS — SIGNIFICANT CHANGE UP (ref 0–0)
PLATELET # BLD AUTO: 144 K/UL — LOW (ref 150–400)
POTASSIUM SERPL-MCNC: 4.3 MMOL/L — SIGNIFICANT CHANGE UP (ref 3.5–5.3)
POTASSIUM SERPL-SCNC: 4.3 MMOL/L — SIGNIFICANT CHANGE UP (ref 3.5–5.3)
RBC # BLD: 4.82 M/UL — SIGNIFICANT CHANGE UP (ref 4.2–5.8)
RBC # FLD: 13.2 % — SIGNIFICANT CHANGE UP (ref 10.3–14.5)
SODIUM SERPL-SCNC: 136 MMOL/L — SIGNIFICANT CHANGE UP (ref 135–145)
WBC # BLD: 15.85 K/UL — HIGH (ref 3.8–10.5)
WBC # FLD AUTO: 15.85 K/UL — HIGH (ref 3.8–10.5)

## 2022-09-21 PROCEDURE — 97530 THERAPEUTIC ACTIVITIES: CPT

## 2022-09-21 PROCEDURE — U0005: CPT

## 2022-09-21 PROCEDURE — 88304 TISSUE EXAM BY PATHOLOGIST: CPT

## 2022-09-21 PROCEDURE — 85025 COMPLETE CBC W/AUTO DIFF WBC: CPT

## 2022-09-21 PROCEDURE — U0003: CPT

## 2022-09-21 PROCEDURE — 97116 GAIT TRAINING THERAPY: CPT

## 2022-09-21 PROCEDURE — 94664 DEMO&/EVAL PT USE INHALER: CPT

## 2022-09-21 PROCEDURE — 99232 SBSQ HOSP IP/OBS MODERATE 35: CPT

## 2022-09-21 PROCEDURE — 97535 SELF CARE MNGMENT TRAINING: CPT

## 2022-09-21 PROCEDURE — 97165 OT EVAL LOW COMPLEX 30 MIN: CPT

## 2022-09-21 PROCEDURE — 76000 FLUOROSCOPY <1 HR PHYS/QHP: CPT

## 2022-09-21 PROCEDURE — 80048 BASIC METABOLIC PNL TOTAL CA: CPT

## 2022-09-21 PROCEDURE — 97110 THERAPEUTIC EXERCISES: CPT

## 2022-09-21 PROCEDURE — 36415 COLL VENOUS BLD VENIPUNCTURE: CPT

## 2022-09-21 PROCEDURE — C1889: CPT

## 2022-09-21 PROCEDURE — 88311 DECALCIFY TISSUE: CPT

## 2022-09-21 PROCEDURE — 97161 PT EVAL LOW COMPLEX 20 MIN: CPT

## 2022-09-21 RX ORDER — HYDROMORPHONE HYDROCHLORIDE 2 MG/ML
1 INJECTION INTRAMUSCULAR; INTRAVENOUS; SUBCUTANEOUS
Qty: 42 | Refills: 0
Start: 2022-09-21 | End: 2022-09-27

## 2022-09-21 RX ORDER — POLYETHYLENE GLYCOL 3350 17 G/17G
17 POWDER, FOR SOLUTION ORAL
Qty: 0 | Refills: 0 | DISCHARGE
Start: 2022-09-21

## 2022-09-21 RX ORDER — SENNA PLUS 8.6 MG/1
2 TABLET ORAL
Qty: 0 | Refills: 0 | DISCHARGE
Start: 2022-09-21

## 2022-09-21 RX ORDER — ACETAMINOPHEN 500 MG
2 TABLET ORAL
Qty: 0 | Refills: 0 | DISCHARGE
Start: 2022-09-21

## 2022-09-21 RX ADMIN — HYDROMORPHONE HYDROCHLORIDE 2 MILLIGRAM(S): 2 INJECTION INTRAMUSCULAR; INTRAVENOUS; SUBCUTANEOUS at 09:43

## 2022-09-21 RX ADMIN — Medication 1000 MILLIGRAM(S): at 14:27

## 2022-09-21 RX ADMIN — Medication 100 MILLIGRAM(S): at 08:18

## 2022-09-21 RX ADMIN — CYCLOBENZAPRINE HYDROCHLORIDE 10 MILLIGRAM(S): 10 TABLET, FILM COATED ORAL at 07:01

## 2022-09-21 RX ADMIN — Medication 50 MICROGRAM(S): at 07:02

## 2022-09-21 RX ADMIN — Medication 1000 MILLIGRAM(S): at 00:01

## 2022-09-21 RX ADMIN — HYDROMORPHONE HYDROCHLORIDE 2 MILLIGRAM(S): 2 INJECTION INTRAMUSCULAR; INTRAVENOUS; SUBCUTANEOUS at 09:13

## 2022-09-21 RX ADMIN — Medication 1000 MILLIGRAM(S): at 07:03

## 2022-09-21 RX ADMIN — Medication 1000 MILLIGRAM(S): at 07:01

## 2022-09-21 RX ADMIN — Medication 100 MILLIGRAM(S): at 07:02

## 2022-09-21 NOTE — OCCUPATIONAL THERAPY INITIAL EVALUATION ADULT - ADL RETRAINING, OT EVAL
Goal: Pt will complete LB dressing using compensatory technique to maintain spinal precautions with Loyda in 1-2 sessions.

## 2022-09-21 NOTE — PHYSICAL THERAPY INITIAL EVALUATION ADULT - RANGE OF MOTION EXAMINATION, REHAB EVAL
within spinal prec's/bilateral upper extremity ROM was WFL (within functional limits)/bilateral lower extremity ROM was WFL (within functional limits)

## 2022-09-21 NOTE — PROGRESS NOTE ADULT - SUBJECTIVE AND OBJECTIVE BOX
Subjective: Patient seen and examined. No overnight events.    MEDICATIONS  (STANDING):  acetaminophen     Tablet .. 1000 milliGRAM(s) Oral every 8 hours  amLODIPine   Tablet 5 milliGRAM(s) Oral daily  atorvastatin 40 milliGRAM(s) Oral at bedtime  lactated ringers. 1000 milliLiter(s) (100 mL/Hr) IV Continuous <Continuous>  levothyroxine 50 MICROGram(s) Oral daily  metoprolol succinate  milliGRAM(s) Oral daily  polyethylene glycol 3350 17 Gram(s) Oral at bedtime  senna 2 Tablet(s) Oral at bedtime    MEDICATIONS  (PRN):  aluminum hydroxide/magnesium hydroxide/simethicone Suspension 30 milliLiter(s) Oral every 12 hours PRN Indigestion  cyclobenzaprine 10 milliGRAM(s) Oral every 8 hours PRN Muscle Spasm  HYDROmorphone   Tablet 2 milliGRAM(s) Oral every 3 hours PRN Moderate Pain (4 - 6)  HYDROmorphone   Tablet 4 milliGRAM(s) Oral every 3 hours PRN Severe Pain (7 - 10)  HYDROmorphone  Injectable 0.5 milliGRAM(s) IV Push every 3 hours PRN Breakthrough pain  magnesium hydroxide Suspension 30 milliLiter(s) Oral every 12 hours PRN Constipation  ondansetron Injectable 4 milliGRAM(s) IV Push every 6 hours PRN Nausea and/or Vomiting      Allergies    No Known Allergies    Intolerances        Vital Signs Last 24 Hrs  T(C): 36.4 (21 Sep 2022 07:39), Max: 36.9 (21 Sep 2022 03:00)  T(F): 97.5 (21 Sep 2022 07:39), Max: 98.4 (21 Sep 2022 03:00)  HR: 80 (21 Sep 2022 07:39) (66 - 81)  BP: 138/68 (21 Sep 2022 07:39) (102/58 - 161/87)  BP(mean): --  RR: 17 (21 Sep 2022 07:39) (14 - 20)  SpO2: 96% (21 Sep 2022 07:39) (94% - 100%)    Parameters below as of 21 Sep 2022 03:00  Patient On (Oxygen Delivery Method): room air        PHYSICAL EXAM:  GENERAL: NAD, well-groomed, well-developed  HEAD:  Atraumatic, Normocephalic  ENMT: Moist mucous membranes,   NECK: Supple, No JVD, Normal thyroid  NERVOUS SYSTEM:  All 4 extremities mobile, no gross sensory deficits.   CHEST/LUNG: Clear to auscultation bilaterally; No rales, rhonchi, wheezing, or rubs  HEART: Regular rate and rhythm; No murmurs, rubs, or gallops  ABDOMEN: Soft, Nontender, Nondistended; Bowel sounds present  EXTREMITIES:  2+ Peripheral Pulses, No clubbing, cyanosis, or edema      LABS:                        14.1   15.85 )-----------( 144      ( 21 Sep 2022 06:30 )             42.0     21 Sep 2022 06:30    136    |  101    |  16     ----------------------------<  178    4.3     |  28     |  1.25     Ca    8.8        21 Sep 2022 06:30          CAPILLARY BLOOD GLUCOSE          RADIOLOGY & ADDITIONAL TESTS:    Imaging Personally Reviewed:  [ ] YES     Consultant(s) Notes Reviewed:      Care Discussed with Consultants/Other Providers:    Advanced Directives: [ ] DNR  [ ] No feeding tube  [ ] MOLST in chart  [ ] MOLST completed today  [ ] Unknown

## 2022-09-21 NOTE — PROGRESS NOTE ADULT - SUBJECTIVE AND OBJECTIVE BOX
POST OPERATIVE DAY #:  1  STATUS POST: Lumbar laminectomy L3-L5    SUBJECTIVE: Patient seen and examined. Ambulated with PT. Tolerating diet. Voiding. Tolerating pain medications well.   Denies fevers, chills, calf pain, chest pain, numbness/tingling    Pain (0-10): 2/10      OBJECTIVE:     Vital Signs Last 24 Hrs  T(C): 36.4 (21 Sep 2022 07:39), Max: 36.9 (21 Sep 2022 03:00)  T(F): 97.5 (21 Sep 2022 07:39), Max: 98.4 (21 Sep 2022 03:00)  HR: 80 (21 Sep 2022 07:39) (66 - 81)  BP: 138/68 (21 Sep 2022 07:39) (102/58 - 161/87)  BP(mean): --  RR: 17 (21 Sep 2022 07:39) (14 - 20)  SpO2: 96% (21 Sep 2022 07:39) (94% - 100%)    Parameters below as of 21 Sep 2022 03:00  Patient On (Oxygen Delivery Method): room air                   Spine          Dressing:  clean/dry/intact, HV removed         Sensation:  intact to light touch           Motor exam:                   Lower extremity                     DF     PF     Ev       Inv     EHL                                               R             5/5   5/5    5/5     5/5     5/5                                               L              5/5   5/5    5/5     5/5     5/5           Calves supple and NT                                              2+ DP pulses          SCDs in place             LABS:                        14.1   15.85 )-----------( 144      ( 21 Sep 2022 06:30 )             42.0     09-21    136  |  101  |  16  ----------------------------<  178<H>  4.3   |  28  |  1.25    Ca    8.8      21 Sep 2022 06:30                A/P :  77y Male, stable,  s/p  Lumbar laminectomy L3-L5      POD # 1  -    Pain control as clinically indicated  -    DVT ppx: SCDs    -    Encourage Incentive Spirometry  -    Physical Therapy  -    Occupational Therapy  -    Weight bearing status: WBAT  -    Discharge Plan: Home when cleared by medicine and PT POST OPERATIVE DAY #:  1  STATUS POST: Lumbar laminectomy L3-L5    SUBJECTIVE: Patient seen and examined. Ambulated with PT. Tolerating diet. Voiding. Tolerating pain medications well.   Denies fevers, chills, calf pain, chest pain, numbness/tingling    Pain (0-10): 2/10      OBJECTIVE:     Vital Signs Last 24 Hrs  T(C): 36.4 (21 Sep 2022 07:39), Max: 36.9 (21 Sep 2022 03:00)  T(F): 97.5 (21 Sep 2022 07:39), Max: 98.4 (21 Sep 2022 03:00)  HR: 80 (21 Sep 2022 07:39) (66 - 81)  BP: 138/68 (21 Sep 2022 07:39) (102/58 - 161/87)  BP(mean): --  RR: 17 (21 Sep 2022 07:39) (14 - 20)  SpO2: 96% (21 Sep 2022 07:39) (94% - 100%)    Parameters below as of 21 Sep 2022 03:00  Patient On (Oxygen Delivery Method): room air                   Spine          Dressing:  clean/dry/intact, HV removed         Sensation:  intact to light touch           Motor exam:                   Lower extremity                     DF     PF     Ev       Inv     EHL                                               R             5/5   5/5    5/5     5/5     5/5                                               L              5/5   5/5    5/5     5/5     5/5           Calves supple and NT                                              2+ DP pulses          SCDs in place             LABS:                        14.1   15.85 )-----------( 144      ( 21 Sep 2022 06:30 )             42.0     09-21    136  |  101  |  16  ----------------------------<  178<H>  4.3   |  28  |  1.25    Ca    8.8      21 Sep 2022 06:30                A/P :  77y Male, stable,  s/p  Lumbar laminectomy L3-L5      POD # 1  -    Pain control as clinically indicated  -    DVT ppx: SCDs    -    Encourage Incentive Spirometry  -    Physical Therapy  -    Occupational Therapy  -    Weight bearing status: WBAT  -    Discharge Plan: Home when cleared by medicine and PT  -    LSO brace ordered. To be worn for all out of bed activities. Brace is to be used to help maintain surgical correction and aide in healing

## 2022-09-21 NOTE — OCCUPATIONAL THERAPY INITIAL EVALUATION ADULT - NSOTDISCHREC_GEN_A_CORE
Supervision/assist with functional activities prn which pt states wife will provide./No skilled OT needs

## 2022-09-21 NOTE — OCCUPATIONAL THERAPY INITIAL EVALUATION ADULT - GENERAL OBSERVATIONS, REHAB EVAL
Pt found semi-supine in bed, +hemovac, +SCDs, +IVL (dislodged during session-RN Eleonora aware and performed bedside care).

## 2022-09-21 NOTE — OCCUPATIONAL THERAPY INITIAL EVALUATION ADULT - LIVES WITH, PROFILE
Pt lives with his wife in a private home, 5 steps to enter with a walk in shower. Pt was independent with ADLs, functional mobility/transfers using a cane prior to admission./spouse

## 2022-09-21 NOTE — OCCUPATIONAL THERAPY INITIAL EVALUATION ADULT - DIAGNOSIS, OT EVAL
Pt with impaired strength, balance impacting pt's ability to complete ADLs, IADLs, functional mobility/transfers.

## 2022-09-21 NOTE — PROGRESS NOTE ADULT - SUBJECTIVE AND OBJECTIVE BOX
Discharge medication calendar:  APAP 1000mg q8h x 2-3 weeks  Narcotic PRN  Docusate 100mg TID while taking narcotic  Miralax, Senna, or Bisacodyl PRN for treatment of constipation

## 2022-09-21 NOTE — DISCHARGE NOTE PROVIDER - NSDCQMPCI_CARD_ALL_CORE
[FreeTextEntry1] : The patient presents for follow up of chronic and painful mycotic nail disease as well as painful kyperkeratoses. Past professional  tx's in the past in the presence of PAD have consisted of periodic debridements of both nails and painful keratoses which have offered significant relief in controlling of symptoms and the patient presents for follow up care.\par 
No

## 2022-09-21 NOTE — OCCUPATIONAL THERAPY INITIAL EVALUATION ADULT - PERTINENT HX OF CURRENT PROBLEM, REHAB EVAL
77 y.o. male who presents with 5 year history of lower back pain  radiating down to left leg. Reports constant pain and increased pain with walking , unable to walk more than 1 block . Tried ALPESH in the past with mild relief in the past , however pain continued . Scheduled for lumbar laminectomy L3-4,L4-5 77 y.o. male who presents with 5 year history of lower back pain  radiating down to left leg. Reports constant pain and increased pain with walking , unable to walk more than 1 block . Tried steroid injections in the past with mild relief in the past, however pain continued. Pt s/p lumbar laminectomy L3-L5 9/20/22.

## 2022-09-21 NOTE — PHYSICAL THERAPY INITIAL EVALUATION ADULT - BED MOBILITY LIMITATIONS, REHAB EVAL
log roll/decreased ability to use arms for pushing/pulling/decreased ability to use legs for bridging/pushing

## 2022-09-21 NOTE — DISCHARGE NOTE NURSING/CASE MANAGEMENT/SOCIAL WORK - PATIENT PORTAL LINK FT
You can access the FollowMyHealth Patient Portal offered by [facility] by registering at the following website: [email or web address]. By joining [facility]’s FollowMyHealth portal, you will also be able to view your health information using other applications (apps) compatible with our system.

## 2022-09-21 NOTE — PROGRESS NOTE ADULT - ASSESSMENT
Asymptomatic post-op 76 y/o M with PMH of HTN, Dyslipidemia, AS, CAD s/p MI s/p PCI, Hypothyroidism, BPH, and GERD.     Lumbar spinal stenosis.    s/p laminectomy, asymptomatic post-op, pain control, bowel regimen, IVF hydration, I & O monitoring, and incentive spirometry. PT & OT consults and mobilization as per orthopedic surgery team.      HTN (hypertension)  controlled, continue Toprol XL & Amlodipine with hold parameters, resume Losartan on POD2.      Dyslipidemia.   continue on Atorvastatin (formulary for Rosuvastatin).      CAD (coronary artery disease).   Asymptomatic, continue on Metoprolol succinate & Atorvastatin, resume low dose Aspirin on POD2.      Hypothyroidism.   Levothyroxine 50 mcg PO daily, continue at same dose.      Need for prophylactic measure.   Was started on B/L intermittent pneumatic compression device for mechanical DVT prophylaxis, pharmacological DVT prophylaxis as per orthopedic surgery team.  Patient medically optimized for discharge home if cleared by PT and Ortho.

## 2022-09-21 NOTE — CHART NOTE - NSCHARTNOTEFT_GEN_A_CORE
LSO fitted to patient prior to discharge.  Pt measured and lso fitted and heat modified posterior panel  Pt instructed to don and doff  Follow up if needed. Pt given written instructions and office contact information if any problems arise    Marshall [facility] P&O  [phone]

## 2022-09-21 NOTE — DISCHARGE NOTE PROVIDER - HOSPITAL COURSE
This patient was admitted to [facility] with a history of Lumbar spinal stenosis.  Patient underwent Pre-Surgical Testing and was medically cleared to undergo elective procedure. Patient underwent lumbar laminectomy by Dr. Scar Robertson on 9/20/22. Procedure was well tolerated.  No operative or rd-operative complications arose during patient's hospital course.  Patient received antibiotic according to SCIP guidelines for infection prevention.  SCDs were used for DVT prophylaxis.  Anesthesia, Medical Hospitalist, Physical Therapy and Occupational Therapy were consulted. Patient is stable for discharge with a good prognosis.  Appropriate discharge instructions and medications are provided in this document.

## 2022-09-21 NOTE — PHYSICAL THERAPY INITIAL EVALUATION ADULT - ADDITIONAL COMMENTS
pvt home with garage entrance no steps to family room, then stays on main floor. Pt has cane. Pt's spouse available to ast upon d/c home. Pt drives.

## 2022-09-21 NOTE — DISCHARGE NOTE NURSING/CASE MANAGEMENT/SOCIAL WORK - MODE OF TRANSPORTATION
Problem: Patient Care Overview (Adult)  Goal: Plan of Care Review  Outcome: Ongoing (interventions implemented as appropriate)    11/02/17 0423   Outcome Evaluation   Outcome Summary/Follow up Plan knows name , is hard tto understand, o2 at 2 per n/c   Coping/Psychosocial Response Interventions   Plan Of Care Reviewed With patient   Patient Care Overview   Progress progress toward functional goals as expected         Problem: Pressure Ulcer Risk (Yogi Scale) (Adult,Obstetrics,Pediatric)  Goal: Skin Integrity  Outcome: Ongoing (interventions implemented as appropriate)    Problem: Fall Risk (Adult)  Goal: Identify Related Risk Factors and Signs and Symptoms  Outcome: Ongoing (interventions implemented as appropriate)    Problem: Pneumonia (Adult)  Goal: Signs and Symptoms of Listed Potential Problems Will be Absent or Manageable (Pneumonia)  Outcome: Ongoing (interventions implemented as appropriate)    Problem: SAFETY - NON-VIOLENT RESTRAINT  Goal: Free from restraint(s) (Non-Violent Restraint)  Outcome: Ongoing (interventions implemented as appropriate)       Wheelchair/Stroller

## 2022-09-21 NOTE — DISCHARGE NOTE PROVIDER - NSDCFUADDINST_GEN_ALL_CORE_FT
- Call your doctor if you experience:  • An increase in pain not controlled by pain medication or change in activity or  position.  • Temperature greater than 101° F.  • Redness, increased swelling or foul smelling drainage from or around the  incision.  • Numbness, tingling or a change in color or temperature of the operative extremity.  • Call your doctor immediately if you experience chest pain, shortness of breath or calf pain.     Follow all verbal and written instructions. Take medications as prescribed. DO NOT drive, operate machinery, and/or make important decisions while on prescription pain medication. DO NOT hesitate to call Doctor's office with questions or concerns.     Pain medicine has been prescribed for you, as needed, and it often causes constipation.  Take docusate sodium (Colace) 100mg 3x daily, while taking narcotic pain medication.   For Constipation :   • Increase your water intake. Drink at least 8 glasses of water daily.  • Try adding fiber to your diet by eating fruits, vegetables and foods that are rich in grains.  • If you do experience constipation, you may take an over-the-counter laxative such as Senokot, Miralax or  Milk of Magnesia.

## 2022-09-21 NOTE — DISCHARGE NOTE PROVIDER - NSDCCPCAREPLAN_GEN_ALL_CORE_FT
PRINCIPAL DISCHARGE DIAGNOSIS  Diagnosis: Lumbar stenosis  Assessment and Plan of Treatment: No bending, lifting or twisting (NO BLT).   Do NOT drive a car.  You may be driven in a car.  You may shower if the dressing is the clear plastic type or if you cover the existing dressing with plastic and tape to prevent it from getting wet.  Change dressing with dry, sterile gauze and tape if it becomes loose, wet or soiled.   Observe low back precautions as described by the Physical Therapist.  Walking is your best exercise.  It is best not to remain in one position for long periods such as long car rides.

## 2022-09-21 NOTE — DISCHARGE NOTE PROVIDER - NSDCMRMEDTOKEN_GEN_ALL_CORE_FT
amLODIPine 5 mg oral tablet: 1 tab(s) orally once a day  aspirin 81 mg oral delayed release tablet: 1 tab(s) orally once a day   Crestor 10 mg oral tablet: 1 tab(s) orally once a day  losartan-hydrochlorothiazide 100mg-12.5mg oral tablet: 1 tab(s) orally once a day  Lumbar Brace: Wear when out of bed for support and comfort.  s/p L3-5 Laminectomy   metoprolol succinate 100 mg oral capsule, extended release: 1 cap(s) orally once a day  Synthroid 50 mcg (0.05 mg) oral tablet: 1 tab(s) orally once a day   acetaminophen 500 mg oral tablet: 2 tab(s) orally every 8 hours  amLODIPine 5 mg oral tablet: 1 tab(s) orally once a day  aspirin 81 mg oral delayed release tablet: 1 tab(s) orally once a day   Crestor 10 mg oral tablet: 1 tab(s) orally once a day  HYDROmorphone 2 mg oral tablet: 1 tab(s) orally every 4 hours, As Needed -for severe pain      Reference #: [id number] MDD:6     G89.18  losartan-hydrochlorothiazide 100mg-12.5mg oral tablet: 1 tab(s) orally once a day  Lumbar Brace: Wear when out of bed for support and comfort.  s/p L3-5 Laminectomy   metoprolol succinate 100 mg oral capsule, extended release: 1 cap(s) orally once a day  polyethylene glycol 3350 oral powder for reconstitution: 17 gram(s) orally once a day (at bedtime)  senna leaf extract oral tablet: 2 tab(s) orally once a day (at bedtime)  Synthroid 50 mcg (0.05 mg) oral tablet: 1 tab(s) orally once a day

## 2022-09-21 NOTE — OCCUPATIONAL THERAPY INITIAL EVALUATION ADULT - RANGE OF MOTION EXAMINATION, UPPER EXTREMITY
shoulder flexion held 0-90* 2* spinal precautions/bilateral UE Active ROM was WFL  (within functional limits)

## 2022-09-21 NOTE — DISCHARGE NOTE NURSING/CASE MANAGEMENT/SOCIAL WORK - NSDCPEFALRISK_GEN_ALL_CORE
For information on Fall & Injury Prevention, visit: [email or web address] OR  [email or web address] OR  https://www.cdc.gov/steadi/patient.html

## 2022-09-21 NOTE — PHYSICAL THERAPY INITIAL EVALUATION ADULT - PERTINENT HX OF CURRENT PROBLEM, REHAB EVAL
presents with 5 year history of  lower back pain  radiating down to left leg . Reports constant pain and increased pain with walking , unable to walk more than 1 block . Tried ALPESH in the past with mild relief in the past , however pain continued . Scheduled for lumbar laminectomy L3-4,L4-5

## 2022-09-21 NOTE — DISCHARGE NOTE PROVIDER - NSDCFUSCHEDAPPT_GEN_ALL_CORE_FT
Scar Robertson  [facility]  ORTHOSURG [address] [facility]  Scheduled Appointment: 10/03/2022    Trisha Mccarthy  [facility]  INTMED [address] [facility]  Scheduled Appointment: 11/29/2022

## 2022-09-22 ENCOUNTER — NON-APPOINTMENT (OUTPATIENT)
Age: 77
End: 2022-09-22

## 2022-09-27 ENCOUNTER — RX RENEWAL (OUTPATIENT)
Age: 77
End: 2022-09-27

## 2022-10-03 ENCOUNTER — NON-APPOINTMENT (OUTPATIENT)
Age: 77
End: 2022-10-03

## 2022-10-03 ENCOUNTER — APPOINTMENT (OUTPATIENT)
Dept: ORTHOPEDIC SURGERY | Facility: CLINIC | Age: 77
End: 2022-10-03

## 2022-10-03 VITALS — HEART RATE: 77 BPM | SYSTOLIC BLOOD PRESSURE: 194 MMHG | HEIGHT: 64 IN | DIASTOLIC BLOOD PRESSURE: 69 MMHG

## 2022-10-03 DIAGNOSIS — M50.30 OTHER CERVICAL DISC DEGENERATION, UNSPECIFIED CERVICAL REGION: ICD-10-CM

## 2022-10-03 PROCEDURE — 99024 POSTOP FOLLOW-UP VISIT: CPT

## 2022-10-03 RX ORDER — TIZANIDINE 2 MG/1
2 TABLET ORAL EVERY 8 HOURS
Qty: 30 | Refills: 0 | Status: ACTIVE | COMMUNITY
Start: 2021-06-28 | End: 1900-01-01

## 2022-10-05 NOTE — HISTORY OF PRESENT ILLNESS
[___ Days Post Op] : post op day #[unfilled] [3] : the patient reports pain that is 3/10 in severity [Clean/Dry/Intact] : clean, dry and intact [Vascular Intact] : ~T peripheral vascular exam normal [Negative Daisy's] : maneuvers demonstrated a negative Daisy's sign [Doing Well] : is doing well [Excellent Pain Control] : has excellent pain control [No Sign of Infection] : is showing no signs of infection [Steri-Strips Removed & Replaced] : steri-strips removed and replaced [Limited ADLs] : to participate in activities of daily living with limitations [No Work] : not to work [No Housework] : not to do housework [No Sports] : not to participate in sports [Chills] : no chills [Constipation] : no constipation [Diarrhea] : no diarrhea [Dysuria] : no dysuria [Fever] : no fever [Nausea] : no nausea [Vomiting] : no vomiting [Erythema] : not erythematous [Discharge] : absent of discharge [Swelling] : not swollen [Dehiscence] : not dehisced [de-identified] : Lumbar laminectomy 9/20/22 [de-identified] : Patient is here today for his first post operative office visit overall feeling okay Pain level is a 3 taking tylenol and wearing back support and cane to ambulate. [de-identified] : seen with his wife\par 5/5 EHL, ankle DF, plantar flexion.  Grossly intact light touch sensation bilateral lower extremities.  Negative straight leg raise bilaterally. [de-identified] : Patient is doing well with improvement of his preoperative leg pain symptoms and is able to walk better.  Incision is healing well. No significant back pain complaints.  Uses Tylenol on an as-needed basis.\par \par Referral for physical therapy was provided prior to starting 2 weeks.  Tizanidine was prescribed as a muscle relaxant to use on as-needed basis as well.\par \par Overall he is pleased with the outcome of surgery.  Recommended follow-up in 4 weeks on as-needed basis.

## 2022-10-31 ENCOUNTER — APPOINTMENT (OUTPATIENT)
Dept: ORTHOPEDIC SURGERY | Facility: CLINIC | Age: 77
End: 2022-10-31

## 2022-10-31 VITALS
HEIGHT: 64 IN | WEIGHT: 156 LBS | DIASTOLIC BLOOD PRESSURE: 73 MMHG | HEART RATE: 66 BPM | BODY MASS INDEX: 26.63 KG/M2 | SYSTOLIC BLOOD PRESSURE: 157 MMHG

## 2022-10-31 DIAGNOSIS — M79.662 PAIN IN LEFT LOWER LEG: ICD-10-CM

## 2022-10-31 PROCEDURE — 99024 POSTOP FOLLOW-UP VISIT: CPT

## 2022-10-31 RX ORDER — GABAPENTIN 100 MG/1
100 CAPSULE ORAL
Qty: 30 | Refills: 2 | Status: ACTIVE | COMMUNITY
Start: 2022-10-31 | End: 1900-01-01

## 2022-10-31 NOTE — HISTORY OF PRESENT ILLNESS
[___ Weeks Post Op] : [unfilled] weeks post op [7] : the patient reports pain that is 7/10 in severity [Doing Well] : is doing well [Excellent Pain Control] : has excellent pain control [No Sign of Infection] : is showing no signs of infection [None] : None [Chills] : no chills [Constipation] : no constipation [Diarrhea] : no diarrhea [Dysuria] : no dysuria [Fever] : no fever [Nausea] : no nausea [Vomiting] : no vomiting [de-identified] : Lumbar laminectomy 9/20/22 [de-identified] : Patient is here today for his 6 week checkup. Overall signficiant improvement. Patient going for physical therapy 3 times a week. Cane to ambulate. [de-identified] : seen with his wife\par 5/5 EHL, ankle DF, plantar flexion. Grossly intact light touch sensation bilateral lower extremities. Negative straight leg raise bilaterally. The surgical incision site(s) was clean, dry and intact, not erythematous, absent of discharge, not swollen and not dehisced. Additional findings included peripheral vascular exam normal and maneuvers demonstrated a negative Daisy's sign.  [de-identified] : Overall patient is doing well.  He reports some discomfort left side of his back with standing and walking.  Also reports some Symptoms of left calf discomfort.  He has reported some tightness of his calf.  Recommended lower extremity Dopplers to rule out a DVT in his left leg.  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  He understands that his degeneration and loss he has not been interested in that surgery in the past.  He is significantly improved compared to preop with improved walking ability and significant improvement of his back pain as well.\par \par I will see them back in 3 months on as-needed basis unless there is a positive finding on lower extremity Dopplers.\par \par He has been instructed to wean off the lumbosacral orthosis at this time.  Also recommended that he minimize use of his cane.

## 2022-11-02 ENCOUNTER — APPOINTMENT (OUTPATIENT)
Dept: ULTRASOUND IMAGING | Facility: CLINIC | Age: 77
End: 2022-11-02

## 2022-11-02 PROCEDURE — 93971 EXTREMITY STUDY: CPT | Mod: LT

## 2022-11-29 ENCOUNTER — APPOINTMENT (OUTPATIENT)
Dept: INTERNAL MEDICINE | Facility: CLINIC | Age: 77
End: 2022-11-29

## 2022-11-30 ENCOUNTER — RX RENEWAL (OUTPATIENT)
Age: 77
End: 2022-11-30

## 2022-12-11 NOTE — ASU PATIENT PROFILE, ADULT - AS SC BRADEN MOISTURE
[Home] : at home, [unfilled] , 4) rarely moist

## 2023-01-11 ENCOUNTER — APPOINTMENT (OUTPATIENT)
Dept: ORTHOPEDIC SURGERY | Facility: CLINIC | Age: 78
End: 2023-01-11
Payer: MEDICARE

## 2023-01-11 DIAGNOSIS — M70.52 OTHER BURSITIS OF KNEE, LEFT KNEE: ICD-10-CM

## 2023-01-11 PROCEDURE — 73564 X-RAY EXAM KNEE 4 OR MORE: CPT | Mod: LT

## 2023-01-11 PROCEDURE — 20610 DRAIN/INJ JOINT/BURSA W/O US: CPT | Mod: LT

## 2023-01-11 PROCEDURE — 99214 OFFICE O/P EST MOD 30 MIN: CPT | Mod: 25

## 2023-02-01 ENCOUNTER — APPOINTMENT (OUTPATIENT)
Dept: ORTHOPEDIC SURGERY | Facility: CLINIC | Age: 78
End: 2023-02-01
Payer: MEDICARE

## 2023-02-01 VITALS
DIASTOLIC BLOOD PRESSURE: 74 MMHG | BODY MASS INDEX: 26.63 KG/M2 | WEIGHT: 156 LBS | HEART RATE: 93 BPM | HEIGHT: 64 IN | SYSTOLIC BLOOD PRESSURE: 146 MMHG

## 2023-02-01 PROCEDURE — 99213 OFFICE O/P EST LOW 20 MIN: CPT

## 2023-02-07 ENCOUNTER — RX RENEWAL (OUTPATIENT)
Age: 78
End: 2023-02-07

## 2023-02-07 ENCOUNTER — APPOINTMENT (OUTPATIENT)
Dept: INTERNAL MEDICINE | Facility: CLINIC | Age: 78
End: 2023-02-07
Payer: MEDICARE

## 2023-02-07 VITALS
RESPIRATION RATE: 14 BRPM | DIASTOLIC BLOOD PRESSURE: 76 MMHG | BODY MASS INDEX: 27.31 KG/M2 | HEIGHT: 64 IN | OXYGEN SATURATION: 94 % | HEART RATE: 104 BPM | SYSTOLIC BLOOD PRESSURE: 140 MMHG | WEIGHT: 160 LBS

## 2023-02-07 DIAGNOSIS — Z87.898 PERSONAL HISTORY OF OTHER SPECIFIED CONDITIONS: ICD-10-CM

## 2023-02-07 DIAGNOSIS — R05.9 COUGH, UNSPECIFIED: ICD-10-CM

## 2023-02-07 DIAGNOSIS — H65.199 OTHER ACUTE NONSUPPURATIVE OTITIS MEDIA, UNSPECIFIED EAR: ICD-10-CM

## 2023-02-07 PROCEDURE — 99213 OFFICE O/P EST LOW 20 MIN: CPT

## 2023-02-07 RX ORDER — BUDESONIDE AND FORMOTEROL FUMARATE DIHYDRATE 160; 4.5 UG/1; UG/1
160-4.5 AEROSOL RESPIRATORY (INHALATION) TWICE DAILY
Qty: 1 | Refills: 2 | Status: ACTIVE | COMMUNITY
Start: 2023-02-07 | End: 1900-01-01

## 2023-02-07 NOTE — PHYSICAL EXAM
[Coordination Grossly Intact] : coordination grossly intact [No Focal Deficits] : no focal deficits [Normal Gait] : normal gait [Normal] : affect was normal and insight and judgment were intact [de-identified] : mild erythmea posterior oropharynx, no exudates. +erythema and bulging of TM, left > R, + air fluid level

## 2023-02-07 NOTE — PLAN
[FreeTextEntry1] : Left otitis media: start Augmentin BID for 10 days \par Cough/bronchitis- likely persisting after initial viral illness. Lungs CTA b/l. s/p course of azithromycin, now on Augmentin for otitis media. Start albuterol nebs PRN (pt has nebulizer machine at home). Symbicort refilled. Start benzonatate PRN . Follow up in 1 week if no improvement.

## 2023-02-07 NOTE — HISTORY OF PRESENT ILLNESS
[Spouse] : spouse [FreeTextEntry8] : Patient is a 77 year old male who presents today with complaint of dry cough, hoarse voice, and left ear pain. Patient states symptoms started almost two weeks ago. His daughter is a pulmonologist and had prescribed him zithromax and Symbicort, which helped slightly but did not completely improve his symptoms. He states that recently the cough has been very dry and his voice is very hoarse, and he has some intermittent wheezing. He has no nasal congestion but has mild left ear pain. He has no fevers, chills, chest pain, palpitations, SOB.

## 2023-02-08 DIAGNOSIS — J98.01 ACUTE BRONCHOSPASM: ICD-10-CM

## 2023-02-11 ENCOUNTER — APPOINTMENT (OUTPATIENT)
Dept: INTERNAL MEDICINE | Facility: CLINIC | Age: 78
End: 2023-02-11
Payer: MEDICARE

## 2023-02-11 VITALS
RESPIRATION RATE: 16 BRPM | DIASTOLIC BLOOD PRESSURE: 68 MMHG | HEART RATE: 92 BPM | TEMPERATURE: 98.2 F | SYSTOLIC BLOOD PRESSURE: 124 MMHG | OXYGEN SATURATION: 98 % | HEIGHT: 64 IN | WEIGHT: 160 LBS | BODY MASS INDEX: 27.31 KG/M2

## 2023-02-11 DIAGNOSIS — J01.90 ACUTE SINUSITIS, UNSPECIFIED: ICD-10-CM

## 2023-02-11 DIAGNOSIS — E55.9 VITAMIN D DEFICIENCY, UNSPECIFIED: ICD-10-CM

## 2023-02-11 PROCEDURE — 99214 OFFICE O/P EST MOD 30 MIN: CPT

## 2023-02-11 RX ORDER — ALBUTEROL SULFATE 2.5 MG/3ML
(2.5 MG/3ML) SOLUTION RESPIRATORY (INHALATION)
Qty: 1 | Refills: 0 | Status: ACTIVE | COMMUNITY
Start: 2023-02-07 | End: 1900-01-01

## 2023-02-11 NOTE — HISTORY OF PRESENT ILLNESS
[de-identified] : Pt here today for follow up.\par Pt treated for sinus infection- Day 3 of Augmentin and on Symbicort.\par Pt with HTN- stable on meds.

## 2023-02-11 NOTE — ASSESSMENT
[FreeTextEntry1] : acute sinus- add FLonase NS- Continue meds.\par HTN- good control\par Hypothyroidism- check labs today

## 2023-02-11 NOTE — PHYSICAL EXAM
[Normal] : normal gait, coordination grossly intact, no focal deficits and deep tendon reflexes were 2+ and symmetric [de-identified] : tm dull sinus pressure

## 2023-02-11 NOTE — REVIEW OF SYSTEMS
[Fatigue] : fatigue [Nasal Discharge] : nasal discharge [Sore Throat] : sore throat [Hoarseness] : hoarseness [Negative] : Heme/Lymph

## 2023-02-12 ENCOUNTER — RX RENEWAL (OUTPATIENT)
Age: 78
End: 2023-02-12

## 2023-02-13 ENCOUNTER — APPOINTMENT (OUTPATIENT)
Dept: ORTHOPEDIC SURGERY | Facility: CLINIC | Age: 78
End: 2023-02-13
Payer: MEDICARE

## 2023-02-13 ENCOUNTER — LABORATORY RESULT (OUTPATIENT)
Age: 78
End: 2023-02-13

## 2023-02-13 DIAGNOSIS — M17.0 BILATERAL PRIMARY OSTEOARTHRITIS OF KNEE: ICD-10-CM

## 2023-02-13 PROCEDURE — 20611 DRAIN/INJ JOINT/BURSA W/US: CPT | Mod: LT

## 2023-02-13 PROCEDURE — 99214 OFFICE O/P EST MOD 30 MIN: CPT | Mod: 25

## 2023-02-14 LAB
ALBUMIN SERPL ELPH-MCNC: 4.4 G/DL
ALP BLD-CCNC: 74 U/L
ALT SERPL-CCNC: 31 U/L
ANION GAP SERPL CALC-SCNC: 12 MMOL/L
AST SERPL-CCNC: 24 U/L
BILIRUB SERPL-MCNC: 0.6 MG/DL
BUN SERPL-MCNC: 20 MG/DL
CALCIUM SERPL-MCNC: 9.5 MG/DL
CHLORIDE SERPL-SCNC: 101 MMOL/L
CHOLEST SERPL-MCNC: 154 MG/DL
CO2 SERPL-SCNC: 28 MMOL/L
CREAT SERPL-MCNC: 1.15 MG/DL
CREAT SPEC-SCNC: 64 MG/DL
CREAT/PROT UR: 0.1 RATIO
EGFR: 65 ML/MIN/1.73M2
GLUCOSE SERPL-MCNC: 94 MG/DL
HDLC SERPL-MCNC: 51 MG/DL
LDLC SERPL CALC-MCNC: 80 MG/DL
NONHDLC SERPL-MCNC: 103 MG/DL
POTASSIUM SERPL-SCNC: 4.2 MMOL/L
PROT SERPL-MCNC: 7 G/DL
PROT UR-MCNC: 4 MG/DL
SODIUM SERPL-SCNC: 142 MMOL/L
T3RU NFR SERPL: 1 TBI
T4 FREE SERPL-MCNC: 1.5 NG/DL
TRIGL SERPL-MCNC: 113 MG/DL
TSH SERPL-ACNC: 4.03 UIU/ML

## 2023-02-15 RX ORDER — LEVALBUTEROL HYDROCHLORIDE 1.25 MG/3ML
1.25 SOLUTION RESPIRATORY (INHALATION)
Qty: 1 | Refills: 1 | Status: ACTIVE | COMMUNITY
Start: 2023-02-15 | End: 1900-01-01

## 2023-02-23 ENCOUNTER — APPOINTMENT (OUTPATIENT)
Dept: CARDIOLOGY | Facility: CLINIC | Age: 78
End: 2023-02-23
Payer: MEDICARE

## 2023-02-23 ENCOUNTER — NON-APPOINTMENT (OUTPATIENT)
Age: 78
End: 2023-02-23

## 2023-02-23 VITALS
RESPIRATION RATE: 16 BRPM | SYSTOLIC BLOOD PRESSURE: 128 MMHG | DIASTOLIC BLOOD PRESSURE: 60 MMHG | HEART RATE: 55 BPM | OXYGEN SATURATION: 96 %

## 2023-02-23 VITALS
DIASTOLIC BLOOD PRESSURE: 58 MMHG | TEMPERATURE: 97.6 F | HEIGHT: 64 IN | HEART RATE: 67 BPM | BODY MASS INDEX: 26.8 KG/M2 | OXYGEN SATURATION: 96 % | WEIGHT: 157 LBS | SYSTOLIC BLOOD PRESSURE: 132 MMHG

## 2023-02-23 PROCEDURE — 99214 OFFICE O/P EST MOD 30 MIN: CPT

## 2023-02-23 PROCEDURE — 93000 ELECTROCARDIOGRAM COMPLETE: CPT

## 2023-02-23 RX ORDER — AMOXICILLIN AND CLAVULANATE POTASSIUM 875; 125 MG/1; MG/1
875-125 TABLET, COATED ORAL
Qty: 20 | Refills: 0 | Status: DISCONTINUED | COMMUNITY
Start: 2023-02-07 | End: 2023-02-23

## 2023-02-23 NOTE — HISTORY OF PRESENT ILLNESS
[FreeTextEntry1] : Patient with history of hypertension and hyperlipidemia, coronary artery disease status post PCI stent 2004,   Prior MI ,  hypothyroid  came for follow up, had surgery , tolerate well , denies any chest pain or shortness of breath\par \par  patients  recent echo showed moderate AS without worsening ,\par \par Patient does have occasional GERD symptoms for which he takes PRN medication  m no change in pattern \par \par Patient  does  have chronic cervical spine pain with restricted motion , did have back surgery , recovered well \par \par Patient blood work  showed  normal renal function , Normal lipids ,  HDL 51 LDL 80  at target level \par \par TSH elevated normal T3 T4 level \par \par \par

## 2023-02-23 NOTE — CARDIOLOGY SUMMARY
[de-identified] : 2/23/23 sinub[place]  AS infarct IVCD non specific ST  T changes [de-identified] : 6/11/20  medium size distal anterior , apical MI without ischemia  EF 46% [de-identified] : 7/7/22  EF 54% suspicious Mild distal anteroseptal hypokinesis , Mild Dd  moderate AS CORRINE 1 Sq cm , PG 27 mm hg , MG 16 mm hg , minima AI , mild dilated ascending aorta 4.1  [de-identified] : 3/15/2004 RCA LAD,2/14/2005 LAD LCX

## 2023-02-23 NOTE — ASSESSMENT
[FreeTextEntry1] : Patient with above history without active cardiac symptoms who is stable \par \par \par History of old MI and, LAD multiple stents, last one 2005, improved ejection fraction without active cardiac symptoms. Continue his current medications. continue ecotrin forever , \par \par Hyperlipidemia: improved  , LDL 58. Continue dietary restriction and medication. Monitor lipid profile and liver function tests\par \par Hypertension: improved , from last visit ,advise the patient to follow low-salt diet and home blood pressure monitoring. continue  losartan HCTZ together 100/12.5 mg po daily , continue  norvasc 5 mg po daily ,  \par \par Hypothyroidism: Mildly elevated TSH, normal T3-T4 continue current dose of Synthroid. Continue monitor TSH levels.\par \par moderate to severe   aortic stenosis: Continuing monitor severity of aortic stenosis. currently he does not have symptoms   explained to him to pay attention to his symptoms like chest pain or SOB on exertion \par \par \par follow up after 4 months \par \par \par \par \par

## 2023-03-10 ENCOUNTER — RX RENEWAL (OUTPATIENT)
Age: 78
End: 2023-03-10

## 2023-03-10 RX ORDER — FLUTICASONE PROPIONATE 50 UG/1
50 SPRAY, METERED NASAL DAILY
Qty: 48 | Refills: 0 | Status: ACTIVE | COMMUNITY
Start: 2023-02-11 | End: 1900-01-01

## 2023-03-14 NOTE — H&P PST ADULT - NS PRO TALK SOMEONE YN
Medical Necessity Clause: This procedure was medically necessary because the lesion that was treated was: no

## 2023-05-05 ENCOUNTER — RX RENEWAL (OUTPATIENT)
Age: 78
End: 2023-05-05

## 2023-05-22 ENCOUNTER — RX CHANGE (OUTPATIENT)
Age: 78
End: 2023-05-22

## 2023-06-01 NOTE — H&P PST ADULT - HEIGHT IN INCHES
3.5 Skyrizi Pregnancy And Lactation Text: The risk during pregnancy and breastfeeding is uncertain with this medication.

## 2023-06-08 ENCOUNTER — NON-APPOINTMENT (OUTPATIENT)
Age: 78
End: 2023-06-08

## 2023-06-08 ENCOUNTER — APPOINTMENT (OUTPATIENT)
Dept: CARDIOLOGY | Facility: CLINIC | Age: 78
End: 2023-06-08
Payer: MEDICARE

## 2023-06-08 VITALS
WEIGHT: 162 LBS | OXYGEN SATURATION: 96 % | BODY MASS INDEX: 27.66 KG/M2 | DIASTOLIC BLOOD PRESSURE: 58 MMHG | HEART RATE: 74 BPM | HEIGHT: 64 IN | SYSTOLIC BLOOD PRESSURE: 118 MMHG

## 2023-06-08 DIAGNOSIS — I25.2 OLD MYOCARDIAL INFARCTION: ICD-10-CM

## 2023-06-08 PROCEDURE — 93000 ELECTROCARDIOGRAM COMPLETE: CPT

## 2023-06-08 PROCEDURE — 99214 OFFICE O/P EST MOD 30 MIN: CPT

## 2023-06-08 RX ORDER — FLUTICASONE PROPIONATE 50 UG/1
50 SPRAY, METERED NASAL
Qty: 48 | Refills: 0 | Status: DISCONTINUED | COMMUNITY
Start: 2019-09-04 | End: 2023-06-08

## 2023-06-08 NOTE — PHYSICAL EXAM
[Well Developed] : well developed [Well Nourished] : well nourished [No Acute Distress] : no acute distress [Normal Conjunctiva] : normal conjunctiva [Normal Venous Pressure] : normal venous pressure [No Carotid Bruit] : no carotid bruit [Normal S1, S2] : normal S1, S2 [No Rub] : no rub [No Gallop] : no gallop [Murmur] : murmur [Clear Lung Fields] : clear lung fields [No Respiratory Distress] : no respiratory distress  [Good Air Entry] : good air entry [Soft] : abdomen soft [Non Tender] : non-tender [No Masses/organomegaly] : no masses/organomegaly [Normal Bowel Sounds] : normal bowel sounds [Normal Gait] : normal gait [No Edema] : no edema [No Cyanosis] : no cyanosis [No Clubbing] : no clubbing [No Varicosities] : no varicosities [No Rash] : no rash [No Skin Lesions] : no skin lesions [Moves all extremities] : moves all extremities [No Focal Deficits] : no focal deficits [Normal Speech] : normal speech [Alert and Oriented] : alert and oriented [Normal memory] : normal memory [de-identified] : 3/6 ESM  intact S2

## 2023-06-08 NOTE — DISCUSSION/SUMMARY
[FreeTextEntry1] : \par \par  [EKG obtained to assist in diagnosis and management of assessed problem(s)] : EKG obtained to assist in diagnosis and management of assessed problem(s)

## 2023-06-08 NOTE — ASSESSMENT
[FreeTextEntry1] : Patient with above history without active cardiac symptoms who is stable \par \par \par History of old MI and, LAD multiple stents, last one 2005, improved ejection fraction without active cardiac symptoms. Continue his current medications. continue ecotrin forever , \par \par Hyperlipidemia: improved  , LDL 80 . Continue dietary restriction and medication. Monitor lipid profile and liver function tests\par \par Hypertension: improved , from last visit ,advise the patient to follow low-salt diet and home blood pressure monitoring. continue  losartan HCTZ together 100/12.5 mg po daily , continue  norvasc 5 mg po daily ,  \par \par Hypothyroidism: Mildly elevated TSH, normal T3-T4 continue current dose of Synthroid. Continue monitor TSH levels.\par \par moderate to severe   aortic stenosis: Continuing monitor severity of aortic stenosis. currently he does not have symptoms   explained to him to pay attention to his symptoms like chest pain or SOB on exertion \par \par \par follow up after 4 months \par \par \par \par \par

## 2023-06-08 NOTE — HISTORY OF PRESENT ILLNESS
[FreeTextEntry1] : Patient with history of hypertension and hyperlipidemia, coronary artery disease status post PCI stent 2004,   Prior MI , aortic stenosis  hypothyroid  came for follow up,denies any chest pain or shortness of breath  , just returned from [place] , \par \par  patients  recent echo showed moderate AS without worsening ,\par \par Patient does have occasional GERD symptoms for which he takes PRN medication  m no change in pattern \par \par Patient  does  have chronic cervical spine pain with restricted motion , did have back surgery , recovered well \par \par Patient blood work  showed  normal renal function , Normal lipids ,  HDL 51 LDL 80  at target level  feb 2023 . blood pressure is controlled well \par \par TSH elevated normal T3 T4 level \par \par \par

## 2023-06-08 NOTE — CARDIOLOGY SUMMARY
[de-identified] : 2/23/23 sinus rhythm   AS infarct IVCD non specific ST  T changes [de-identified] : 6/11/20  medium size distal anterior , apical MI without ischemia  EF 46% [de-identified] : 7/7/22  EF 54% suspicious Mild distal anteroseptal hypokinesis , Mild Dd  moderate AS CORRINE 1 Sq cm , PG 27 mm hg , MG 16 mm hg , minima AI , mild dilated ascending aorta 4.1  [de-identified] : 3/15/2004 RCA LAD,2/14/2005 LAD LCX

## 2023-07-10 ENCOUNTER — APPOINTMENT (OUTPATIENT)
Dept: ORTHOPEDIC SURGERY | Facility: CLINIC | Age: 78
End: 2023-07-10
Payer: MEDICARE

## 2023-07-10 VITALS
HEIGHT: 64 IN | DIASTOLIC BLOOD PRESSURE: 70 MMHG | HEART RATE: 70 BPM | WEIGHT: 155 LBS | SYSTOLIC BLOOD PRESSURE: 152 MMHG | BODY MASS INDEX: 26.46 KG/M2

## 2023-07-10 DIAGNOSIS — M48.07 SPINAL STENOSIS, LUMBOSACRAL REGION: ICD-10-CM

## 2023-07-10 DIAGNOSIS — M48.10 ANKYLOSING HYPEROSTOSIS [FORESTIER], SITE UNSPECIFIED: ICD-10-CM

## 2023-07-10 PROCEDURE — 99214 OFFICE O/P EST MOD 30 MIN: CPT

## 2023-07-10 PROCEDURE — 72170 X-RAY EXAM OF PELVIS: CPT

## 2023-07-10 PROCEDURE — 72110 X-RAY EXAM L-2 SPINE 4/>VWS: CPT

## 2023-07-10 RX ORDER — METHOCARBAMOL 500 MG/1
500 TABLET, FILM COATED ORAL 3 TIMES DAILY
Qty: 30 | Refills: 0 | Status: ACTIVE | COMMUNITY
Start: 2023-07-10 | End: 1900-01-01

## 2023-07-10 NOTE — REASON FOR VISIT
[Follow-Up Visit] : a follow-up visit for [Family Member] : family member [FreeTextEntry2] : Laminectomy 9/20/22

## 2023-07-10 NOTE — DISCUSSION/SUMMARY
[Medication Risks Reviewed] : Medication risks reviewed [de-identified] : Patient has a history of diffuse idiopathic skeletal hyperostosis.  Does not report any significant back pain and overall he does feel improvement following a L3-L5 laminectomy.  However continues to have difficulty walking more than short distances with symptoms radiating down his left leg.  He feels better when leaning forward suggestive of spinal stenosis.  He has had a vascular work-up including lower extremity Dopplers to look for DVT which was negative according to the patient.  He also has good palpable pulses in the lower extremities at the dorsalis pedis bilaterally today.\par \par Prescribed him methocarbamol Celebrex and acupuncture today.  Recommended MRI of the lumbar spine with and without contrast to better assess.  Postoperative spine and to assess for additional stenosis.  MRI of the left knee was also recommended given his prior history of meniscal tear and stiffness reported around the knee and difficulty walking.  A referral to a neurologist was provided for him to go after the MRI lumbar spine has been performed for electrodiagnostic studies and to assess for any neuropathy versus ongoing neural compression\par \par The patient was educated regarding their condition, treatment options as well as prescribed course of treatment. \par Risks and benefits as well as alternatives to the proposed treatment were also provided to the patient \par They were given the opportunity to have all their questions answered to their satisfaction.\par \par Vital signs were reviewed with the patient and the patient was instructed to followup with their primary care provider for further management. There were no PAs or scribes used in the evaluation, exam or treatment plan discussion. The surgeon was the primary evaluating or treating physician as noted above.

## 2023-07-10 NOTE — HISTORY OF PRESENT ILLNESS
[7] : a current pain level of 7/10 [Walking] : walking [Daily] : ~He/She~ states the symptoms seem to be occuring daily [Prolonged Standing] : worsened by prolonged standing [Rest] : relieved by rest [de-identified] : Patient is here today due to having left low back down left leg pain even after his lumbar laminectomy 9/2022 getting progressively worse. [Stable] : stable [___ yrs] : [unfilled] year(s) ago

## 2023-07-10 NOTE — PHYSICAL EXAM
[Stooped] : stooped [SLR] : negative straight leg raise [LE] : Sensory: Intact in bilateral lower extremities [0] : left ankle jerk 0 [Plantar Reflex Right Only] : absent on the right [Plantar Reflex Left Only] : absent on the left [DTR Reflexes Clonus Of Right Ankle (___ Beats)] : absent on the right [DTR Reflexes Clonus Of Left Ankle (___ Beats)] : absent on the left [DP] : dorsalis pedis 2+ and symmetric bilaterally [de-identified] : Seen with his wife and daughter\par healed lumbar incision\par healed right knee incision [de-identified] : 4 views lumbar spine obtained today demonstrate laminectomy seen from L3-L5.  Diffuse idiopathic skeletal hyperostosis noted with lateral osteophytes and anterior osteophytes.  No dynamic instability between flexion-extension.\par \par AP pelvis demonstrates enthesopathy of the iliac crest as well as acetabular labrum and trochanters.  Joint spaces preserved

## 2023-07-21 ENCOUNTER — APPOINTMENT (OUTPATIENT)
Dept: MRI IMAGING | Facility: CLINIC | Age: 78
End: 2023-07-21
Payer: MEDICARE

## 2023-07-21 PROCEDURE — 72158 MRI LUMBAR SPINE W/O & W/DYE: CPT

## 2023-07-21 PROCEDURE — 73721 MRI JNT OF LWR EXTRE W/O DYE: CPT | Mod: LT

## 2023-07-21 PROCEDURE — A9585: CPT

## 2023-08-07 ENCOUNTER — APPOINTMENT (OUTPATIENT)
Dept: ORTHOPEDIC SURGERY | Facility: CLINIC | Age: 78
End: 2023-08-07
Payer: MEDICARE

## 2023-08-07 VITALS — HEART RATE: 62 BPM | DIASTOLIC BLOOD PRESSURE: 69 MMHG | SYSTOLIC BLOOD PRESSURE: 145 MMHG

## 2023-08-07 DIAGNOSIS — M25.571 PAIN IN RIGHT ANKLE AND JOINTS OF RIGHT FOOT: ICD-10-CM

## 2023-08-07 DIAGNOSIS — M48.062 SPINAL STENOSIS, LUMBAR REGION WITH NEUROGENIC CLAUDICATION: ICD-10-CM

## 2023-08-07 DIAGNOSIS — M51.36 OTHER INTERVERTEBRAL DISC DEGENERATION, LUMBAR REGION: ICD-10-CM

## 2023-08-07 DIAGNOSIS — M79.671 PAIN IN RIGHT FOOT: ICD-10-CM

## 2023-08-07 PROCEDURE — 99213 OFFICE O/P EST LOW 20 MIN: CPT

## 2023-08-07 NOTE — DISCUSSION/SUMMARY
[Medication Risks Reviewed] : Medication risks reviewed [de-identified] : MRI lumbar spine performed previously was independent reviewed by me and findings discussed with patient and his daughter.  Patient has no significant central stenosis but significant foraminal stenosis at the L4-5 level left much more than right.  He appears to have a broad-based protrusion that extends from lateral recess into the foramen in the extraforaminal region.  We discussed the option of more definitive treatment for this with a transforaminal lumbar interbody fusion at the L4-5 level.  Is less invasive option an endoscopic discectomy was also a consideration and the family may think about that.  The family would like to discuss and explore their treatment options will follow-up with me on an as-needed basis  He will also follow-up with a knee surgeon regarding management of his knee pathology.  Given his clinical presentation his symptoms are likely related to the neural compression noted on the MRI of the lumbar spine resident's knees though he does have separate knee pathology as well and the family understands this.

## 2023-08-07 NOTE — HISTORY OF PRESENT ILLNESS
[Worsening] : worsening [5] : a current pain level of 5/10 [Prolonged Standing] : worsened by prolonged standing [Standing] : worsened by standing [Walking] : worsened by walking [de-identified] : Patient presents here today for MRI review and reevaluation of left knee and low back pian  Patient reports left side low back pian which radiates to left lower extremity. Patient states pain level today is 5/10 for left knee and low back pain. [de-identified] : Methocarbamol

## 2023-08-07 NOTE — PHYSICAL EXAM
[Stooped] : stooped [LE] : Sensory: Intact in bilateral lower extremities [0] : left ankle jerk 0 [DP] : dorsalis pedis 2+ and symmetric bilaterally [SLR] : negative straight leg raise [Plantar Reflex Right Only] : absent on the right [Plantar Reflex Left Only] : absent on the left [DTR Reflexes Clonus Of Right Ankle (___ Beats)] : absent on the right [DTR Reflexes Clonus Of Left Ankle (___ Beats)] : absent on the left [de-identified] : Seen with his daughter healed lumbar incision healed right knee incision [de-identified] : EXAM: [id number] - MR SPINE LUMBAR WAW IC - ORDERED BY: ARYAN OGDEN   PROCEDURE DATE: 07/21/2023    INTERPRETATION: CLINICAL INFORMATION: Left leg pain. Lumbar radiculopathy  ADDITIONAL CLINICAL INFORMATION: Post-laminectomy syndrome M96.1  TECHNIQUE: Multiplanar, multisequence MRI was performed of the lumbar spine. IV Contrast: Gadavist 7.5 cc administered 0 cc discarded  PRIOR STUDIES: Lumbar spine MRI 11/23/2021  FINDINGS:  LOCALIZER: No additional findings. BONES: There is no fracture or bone marrow edema. ALIGNMENT: Mild levo convexity of the lumbar spine with mild leftward lateral listhesis of L4 and L5, similar in appearance to prior exam. There is mild retrolisthesis of L3 on L4 and grade 1 anterolisthesis of L4 on L5. Status post decompression from L3-L5 SACROILIAC JOINTS/SACRUM: There is no sacral fracture. The SI joints are partially visualized but are intact. CONUS AND CAUDA EQUINA: The distal cord and conus are normal in signal. Conus terminates at L1. VISUALIZED INTRAPELVIC/INTRA-ABDOMINAL SOFT TISSUES: Partially imaged bilateral renal T2 hyperintensities. PARASPINAL SOFT TISSUES: Postsurgical changes within the paraspinal musculature at L4/L5. Seroma within the soft tissues posterior to the thecal sac measuring 1.3 x 1.4 x 0.7 cm at the level of L4.   INDIVIDUAL LEVELS:  LOWER THORACIC SPINE: No spinal canal or neuroforaminal stenosis.  L1-L2: Small disc bulge slightly asymmetric to the right and bilateral facet arthrosis and thickening of the ligamentum flavum resulting in mild spinal canal narrowing and mild left and mild to moderate right foraminal narrowing. Interval resolution of superimposed left protrusion. L2-L3: Foraminal osseous ridging and facet arthrosis. No spinal canal narrowing. Mild foraminal narrowing. L3-L4: Retrolisthesis with diffuse disc bulge and bilateral facet arthrosis. No spinal canal narrowing. Moderate foraminal narrowing, left greater than right. Findings are not significantly changed from prior MRI. L4-L5: Anterolisthesis with uncovering of the disc with disc bulge asymmetric to the left extending into the neural foramen and bilateral facet arthrosis. Findings result in indentation of the thecal sac asymmetric to the left, mild narrowing of the left lateral recess and severe bilateral foraminal narrowing. Foraminal stenosis is similar in appearance and compared with prior exam. L5-S1: No spinal canal or foraminal narrowing.   IMPRESSION:  Status post decompression from L3 to L5. Interval solution of stenosis at L4/L5 with disc bulge at this level mildly indenting the thecal sac/left lateral recess. Severe foraminal narrowing at this level.  --- End of Report ---       ALVINO GLASS MD; Attending Radiologist This document has been electronically signed. Jul 25 2023 10:26AM

## 2023-08-07 NOTE — REASON FOR VISIT
[Follow-Up Visit] : a follow-up visit for [Back Pain] : back pain [Radiculopathy] : radiculopathy [Spinal Stenosis] : spinal stenosis [Family Member] : family member [FreeTextEntry2] : Left knee pain

## 2023-08-14 RX ORDER — CELECOXIB 100 MG/1
100 CAPSULE ORAL TWICE DAILY
Qty: 60 | Refills: 0 | Status: ACTIVE | COMMUNITY
Start: 2023-07-10 | End: 1900-01-01

## 2023-08-15 ENCOUNTER — RX RENEWAL (OUTPATIENT)
Age: 78
End: 2023-08-15

## 2023-08-15 ENCOUNTER — APPOINTMENT (OUTPATIENT)
Dept: INTERNAL MEDICINE | Facility: CLINIC | Age: 78
End: 2023-08-15
Payer: MEDICARE

## 2023-08-15 VITALS
OXYGEN SATURATION: 98 % | BODY MASS INDEX: 27.66 KG/M2 | HEIGHT: 64 IN | HEART RATE: 66 BPM | DIASTOLIC BLOOD PRESSURE: 88 MMHG | TEMPERATURE: 97.9 F | WEIGHT: 162 LBS | RESPIRATION RATE: 16 BRPM | SYSTOLIC BLOOD PRESSURE: 138 MMHG

## 2023-08-15 DIAGNOSIS — E03.9 HYPOTHYROIDISM, UNSPECIFIED: ICD-10-CM

## 2023-08-15 DIAGNOSIS — Z00.00 ENCOUNTER FOR GENERAL ADULT MEDICAL EXAMINATION W/OUT ABNORMAL FINDINGS: ICD-10-CM

## 2023-08-15 PROCEDURE — G0439: CPT

## 2023-08-15 RX ORDER — BENZONATATE 200 MG/1
200 CAPSULE ORAL 3 TIMES DAILY
Qty: 42 | Refills: 0 | Status: DISCONTINUED | COMMUNITY
Start: 2023-02-07 | End: 2023-08-15

## 2023-08-15 NOTE — ASSESSMENT
[FreeTextEntry1] : HTN/ CAD- follow up wtih card. chronic back pain- follow up with spine Hypothyroidism- check levels today GERD- stable

## 2023-08-15 NOTE — PHYSICAL EXAM
[No Acute Distress] : no acute distress [Well Nourished] : well nourished [Well Developed] : well developed [Well-Appearing] : well-appearing [Normal Sclera/Conjunctiva] : normal sclera/conjunctiva [PERRL] : pupils equal round and reactive to light [EOMI] : extraocular movements intact [Normal Outer Ear/Nose] : the outer ears and nose were normal in appearance [Normal Oropharynx] : the oropharynx was normal [No JVD] : no jugular venous distention [No Lymphadenopathy] : no lymphadenopathy [Supple] : supple [Thyroid Normal, No Nodules] : the thyroid was normal and there were no nodules present [No Respiratory Distress] : no respiratory distress  [No Accessory Muscle Use] : no accessory muscle use [Clear to Auscultation] : lungs were clear to auscultation bilaterally [Normal Rate] : normal rate  [Regular Rhythm] : with a regular rhythm [Normal S1, S2] : normal S1 and S2 [No Carotid Bruits] : no carotid bruits [No Abdominal Bruit] : a ~M bruit was not heard ~T in the abdomen [No Varicosities] : no varicosities [Pedal Pulses Present] : the pedal pulses are present [No Edema] : there was no peripheral edema [No Palpable Aorta] : no palpable aorta [No Extremity Clubbing/Cyanosis] : no extremity clubbing/cyanosis [Soft] : abdomen soft [Non Tender] : non-tender [Non-distended] : non-distended [No Masses] : no abdominal mass palpated [No HSM] : no HSM [Normal Bowel Sounds] : normal bowel sounds [Normal Posterior Cervical Nodes] : no posterior cervical lymphadenopathy [Normal Anterior Cervical Nodes] : no anterior cervical lymphadenopathy [No CVA Tenderness] : no CVA  tenderness [No Spinal Tenderness] : no spinal tenderness [No Joint Swelling] : no joint swelling [Grossly Normal Strength/Tone] : grossly normal strength/tone [No Rash] : no rash [Coordination Grossly Intact] : coordination grossly intact [No Focal Deficits] : no focal deficits [Normal Gait] : normal gait [Deep Tendon Reflexes (DTR)] : deep tendon reflexes were 2+ and symmetric [Normal Affect] : the affect was normal [Normal Insight/Judgement] : insight and judgment were intact [de-identified] : sophie

## 2023-08-15 NOTE — HISTORY OF PRESENT ILLNESS
[de-identified] : Chronic back pain- followed by spine.  Plan for possible injection History of CAD- followed by cardiology

## 2023-08-16 ENCOUNTER — TRANSCRIPTION ENCOUNTER (OUTPATIENT)
Age: 78
End: 2023-08-16

## 2023-08-16 NOTE — ASU PATIENT PROFILE, ADULT - FALL HARM RISK - UNIVERSAL INTERVENTIONS
Bed in lowest position, wheels locked, appropriate side rails in place/Call bell, personal items and telephone in reach/Instruct patient to call for assistance before getting out of bed or chair/Non-slip footwear when patient is out of bed/[place] to call system/Physically safe environment - no spills, clutter or unnecessary equipment/Purposeful Proactive Rounding/Room/bathroom lighting operational, light cord in reach

## 2023-08-17 ENCOUNTER — LABORATORY RESULT (OUTPATIENT)
Age: 78
End: 2023-08-17

## 2023-08-17 LAB
25(OH)D3 SERPL-MCNC: 34.6 NG/ML
ALBUMIN SERPL ELPH-MCNC: 4.9 G/DL
ALP BLD-CCNC: 68 U/L
ALT SERPL-CCNC: 35 U/L
ANION GAP SERPL CALC-SCNC: 13 MMOL/L
APPEARANCE: CLEAR
AST SERPL-CCNC: 31 U/L
BACTERIA: NEGATIVE /HPF
BILIRUB SERPL-MCNC: 0.8 MG/DL
BILIRUBIN URINE: NEGATIVE
BLOOD URINE: NEGATIVE
BUN SERPL-MCNC: 16 MG/DL
CALCIUM SERPL-MCNC: 10 MG/DL
CAST: 0 /LPF
CHLORIDE SERPL-SCNC: 102 MMOL/L
CHOLEST SERPL-MCNC: 136 MG/DL
CO2 SERPL-SCNC: 28 MMOL/L
COLOR: YELLOW
CREAT SERPL-MCNC: 1.27 MG/DL
EGFR: 57 ML/MIN/1.73M2
EPITHELIAL CELLS: 0 /HPF
ESTIMATED AVERAGE GLUCOSE: 134 MG/DL
FOLATE SERPL-MCNC: 18.5 NG/ML
GLUCOSE QUALITATIVE U: NEGATIVE MG/DL
GLUCOSE SERPL-MCNC: 113 MG/DL
HBA1C MFR BLD HPLC: 6.3 %
HDLC SERPL-MCNC: 43 MG/DL
KETONES URINE: NEGATIVE MG/DL
LDLC SERPL CALC-MCNC: 68 MG/DL
LEUKOCYTE ESTERASE URINE: NEGATIVE
MICROSCOPIC-UA: NORMAL
NITRITE URINE: NEGATIVE
NONHDLC SERPL-MCNC: 93 MG/DL
PH URINE: 6
POTASSIUM SERPL-SCNC: 4.6 MMOL/L
PROT SERPL-MCNC: 7.4 G/DL
PROTEIN URINE: NEGATIVE MG/DL
PSA SERPL-MCNC: 4.33 NG/ML
RED BLOOD CELLS URINE: 1 /HPF
SODIUM SERPL-SCNC: 143 MMOL/L
SPECIFIC GRAVITY URINE: 1.02
T3RU NFR SERPL: 1.1 TBI
T4 FREE SERPL-MCNC: 1.2 NG/DL
TRIGL SERPL-MCNC: 146 MG/DL
TSH SERPL-ACNC: 2.68 UIU/ML
UROBILINOGEN URINE: 0.2 MG/DL
VIT B12 SERPL-MCNC: 777 PG/ML
WHITE BLOOD CELLS URINE: 1 /HPF

## 2023-08-18 ENCOUNTER — OUTPATIENT (OUTPATIENT)
Dept: OUTPATIENT SERVICES | Facility: HOSPITAL | Age: 78
LOS: 1 days | End: 2023-08-18
Payer: MEDICARE

## 2023-08-18 ENCOUNTER — APPOINTMENT (OUTPATIENT)
Dept: ORTHOPEDIC SURGERY | Facility: HOSPITAL | Age: 78
End: 2023-08-18

## 2023-08-18 VITALS
RESPIRATION RATE: 19 BRPM | HEART RATE: 70 BPM | SYSTOLIC BLOOD PRESSURE: 146 MMHG | OXYGEN SATURATION: 99 % | TEMPERATURE: 98 F | HEIGHT: 64 IN | WEIGHT: 160.06 LBS | DIASTOLIC BLOOD PRESSURE: 51 MMHG

## 2023-08-18 VITALS
HEART RATE: 75 BPM | DIASTOLIC BLOOD PRESSURE: 54 MMHG | RESPIRATION RATE: 17 BRPM | OXYGEN SATURATION: 98 % | SYSTOLIC BLOOD PRESSURE: 127 MMHG

## 2023-08-18 DIAGNOSIS — Z98.49 CATARACT EXTRACTION STATUS, UNSPECIFIED EYE: Chronic | ICD-10-CM

## 2023-08-18 DIAGNOSIS — Z98.890 OTHER SPECIFIED POSTPROCEDURAL STATES: Chronic | ICD-10-CM

## 2023-08-18 DIAGNOSIS — M48.07 SPINAL STENOSIS, LUMBOSACRAL REGION: ICD-10-CM

## 2023-08-18 DIAGNOSIS — S76.119A STRAIN OF UNSPECIFIED QUADRICEPS MUSCLE, FASCIA AND TENDON, INITIAL ENCOUNTER: Chronic | ICD-10-CM

## 2023-08-18 DIAGNOSIS — M54.16 RADICULOPATHY, LUMBAR REGION: ICD-10-CM

## 2023-08-18 DIAGNOSIS — I25.10 ATHEROSCLEROTIC HEART DISEASE OF NATIVE CORONARY ARTERY WITHOUT ANGINA PECTORIS: Chronic | ICD-10-CM

## 2023-08-18 PROCEDURE — 64484 NJX AA&/STRD TFRM EPI L/S EA: CPT | Mod: LT

## 2023-08-18 PROCEDURE — 64483 NJX AA&/STRD TFRM EPI L/S 1: CPT | Mod: LT

## 2023-08-18 RX ORDER — METOPROLOL TARTRATE 50 MG
1 TABLET ORAL
Qty: 0 | Refills: 0 | DISCHARGE

## 2023-08-18 RX ORDER — LOSARTAN/HYDROCHLOROTHIAZIDE 100MG-25MG
1 TABLET ORAL
Qty: 0 | Refills: 0 | DISCHARGE

## 2023-08-18 RX ORDER — LEVOTHYROXINE SODIUM 125 MCG
1 TABLET ORAL
Qty: 0 | Refills: 0 | DISCHARGE

## 2023-08-18 RX ORDER — AMLODIPINE BESYLATE 2.5 MG/1
1 TABLET ORAL
Qty: 0 | Refills: 0 | DISCHARGE

## 2023-08-18 RX ORDER — ROSUVASTATIN CALCIUM 5 MG/1
1 TABLET ORAL
Qty: 0 | Refills: 0 | DISCHARGE

## 2023-08-18 RX ORDER — ASPIRIN/CALCIUM CARB/MAGNESIUM 324 MG
1 TABLET ORAL
Qty: 0 | Refills: 0 | DISCHARGE

## 2023-09-08 ENCOUNTER — APPOINTMENT (OUTPATIENT)
Dept: ORTHOPEDIC SURGERY | Facility: CLINIC | Age: 78
End: 2023-09-08
Payer: MEDICARE

## 2023-09-08 VITALS
DIASTOLIC BLOOD PRESSURE: 88 MMHG | WEIGHT: 160 LBS | HEART RATE: 70 BPM | BODY MASS INDEX: 27.46 KG/M2 | SYSTOLIC BLOOD PRESSURE: 140 MMHG

## 2023-09-08 DIAGNOSIS — M54.16 RADICULOPATHY, LUMBAR REGION: ICD-10-CM

## 2023-09-08 DIAGNOSIS — Z98.890 OTHER SPECIFIED POSTPROCEDURAL STATES: ICD-10-CM

## 2023-09-08 PROCEDURE — 99213 OFFICE O/P EST LOW 20 MIN: CPT

## 2023-09-08 NOTE — HISTORY OF PRESENT ILLNESS
[Improving] : improving [2] : a current pain level of 2/10 [Intermit.] : ~He/She~ states the symptoms seem to be intermittent [Bending] : worsened by bending [Walking] : worsened by walking [None] : No relieving factors are noted [de-identified] : Patient presents for follow up after ALPESH to left L4-L5 that was done on 8/18/23. Patient states the pain has decreased since ALPESH and has received relief. Denies c/o numbness or tingling. Pain radiates to left knee; worsens with walking. Overall 40% better Walks [Lifting] : not worsened by lifting

## 2023-09-08 NOTE — DISCUSSION/SUMMARY
[Medication Risks Reviewed] : Medication risks reviewed [de-identified] : Patient has no significant central stenosis but significant foraminal stenosis at the L4-5 level left much more than right.  He appears to have a broad-based protrusion that extends from lateral recess into the foramen in the extraforaminal region.  We discussed the option of more definitive treatment for this with a transforaminal lumbar interbody fusion at the L4-5 level.  Is less invasive option an endoscopic discectomy was also a consideration and the family may think about that.   The family would like to discuss and explore their treatment options will follow-up with me on an as-needed basis. He feels better following left L4, L5 ALPESH  He will also follow-up with a knee surgeon regarding management of his knee pathology.  Given his clinical presentation his symptoms are likely related to the neural compression noted on the MRI of the lumbar spine resident's knees though he does have separate knee pathology as well and the family understands this.

## 2023-09-08 NOTE — REASON FOR VISIT
[Follow-Up Visit] : a follow-up visit for [Back Pain] : back pain [Radiculopathy] : radiculopathy [Spinal Stenosis] : spinal stenosis [Spouse] : spouse

## 2023-09-08 NOTE — PHYSICAL EXAM
[Stooped] : stooped [LE] : Sensory: Intact in bilateral lower extremities [0] : left ankle jerk 0 [DP] : dorsalis pedis 2+ and symmetric bilaterally [SLR] : negative straight leg raise [Plantar Reflex Right Only] : absent on the right [Plantar Reflex Left Only] : absent on the left [DTR Reflexes Clonus Of Right Ankle (___ Beats)] : absent on the right [DTR Reflexes Clonus Of Left Ankle (___ Beats)] : absent on the left [de-identified] : Seen with his wife healed lumbar incision healed right knee incision

## 2023-09-25 ENCOUNTER — NON-APPOINTMENT (OUTPATIENT)
Age: 78
End: 2023-09-25

## 2023-09-27 ENCOUNTER — APPOINTMENT (OUTPATIENT)
Dept: INTERNAL MEDICINE | Facility: CLINIC | Age: 78
End: 2023-09-27
Payer: MEDICARE

## 2023-09-27 VITALS
OXYGEN SATURATION: 95 % | SYSTOLIC BLOOD PRESSURE: 160 MMHG | HEART RATE: 78 BPM | RESPIRATION RATE: 16 BRPM | BODY MASS INDEX: 27.31 KG/M2 | WEIGHT: 160 LBS | DIASTOLIC BLOOD PRESSURE: 80 MMHG | HEIGHT: 64 IN | TEMPERATURE: 97.8 F

## 2023-09-27 VITALS — SYSTOLIC BLOOD PRESSURE: 132 MMHG | DIASTOLIC BLOOD PRESSURE: 80 MMHG

## 2023-09-27 DIAGNOSIS — J01.80 OTHER ACUTE SINUSITIS: ICD-10-CM

## 2023-09-27 PROCEDURE — 99214 OFFICE O/P EST MOD 30 MIN: CPT

## 2023-09-27 RX ORDER — BUDESONIDE AND FORMOTEROL FUMARATE DIHYDRATE 160; 4.5 UG/1; UG/1
160-4.5 AEROSOL RESPIRATORY (INHALATION) TWICE DAILY
Qty: 1 | Refills: 1 | Status: ACTIVE | COMMUNITY
Start: 2023-09-27 | End: 1900-01-01

## 2023-09-27 RX ORDER — CEFUROXIME AXETIL 500 MG/1
500 TABLET ORAL
Qty: 20 | Refills: 0 | Status: ACTIVE | COMMUNITY
Start: 2023-09-27 | End: 1900-01-01

## 2023-09-27 RX ORDER — METHYLPREDNISOLONE 4 MG/1
4 TABLET ORAL
Qty: 21 | Refills: 0 | Status: ACTIVE | COMMUNITY
Start: 2023-09-27 | End: 1900-01-01

## 2023-10-05 ENCOUNTER — APPOINTMENT (OUTPATIENT)
Dept: CARDIOLOGY | Facility: CLINIC | Age: 78
End: 2023-10-05
Payer: MEDICARE

## 2023-10-05 ENCOUNTER — APPOINTMENT (OUTPATIENT)
Dept: RADIOLOGY | Facility: CLINIC | Age: 78
End: 2023-10-05
Payer: MEDICARE

## 2023-10-05 ENCOUNTER — OUTPATIENT (OUTPATIENT)
Dept: OUTPATIENT SERVICES | Facility: HOSPITAL | Age: 78
LOS: 1 days | End: 2023-10-05
Payer: MEDICARE

## 2023-10-05 ENCOUNTER — TRANSCRIPTION ENCOUNTER (OUTPATIENT)
Age: 78
End: 2023-10-05

## 2023-10-05 ENCOUNTER — NON-APPOINTMENT (OUTPATIENT)
Age: 78
End: 2023-10-05

## 2023-10-05 VITALS
DIASTOLIC BLOOD PRESSURE: 54 MMHG | OXYGEN SATURATION: 95 % | SYSTOLIC BLOOD PRESSURE: 130 MMHG | WEIGHT: 160 LBS | BODY MASS INDEX: 27.31 KG/M2 | HEIGHT: 64 IN | HEART RATE: 69 BPM

## 2023-10-05 DIAGNOSIS — K21.9 GASTRO-ESOPHAGEAL REFLUX DISEASE W/OUT ESOPHAGITIS: ICD-10-CM

## 2023-10-05 DIAGNOSIS — J20.9 ACUTE BRONCHITIS, UNSPECIFIED: ICD-10-CM

## 2023-10-05 DIAGNOSIS — S76.119A STRAIN OF UNSPECIFIED QUADRICEPS MUSCLE, FASCIA AND TENDON, INITIAL ENCOUNTER: Chronic | ICD-10-CM

## 2023-10-05 DIAGNOSIS — Z98.49 CATARACT EXTRACTION STATUS, UNSPECIFIED EYE: Chronic | ICD-10-CM

## 2023-10-05 PROCEDURE — 93306 TTE W/DOPPLER COMPLETE: CPT

## 2023-10-05 PROCEDURE — 71046 X-RAY EXAM CHEST 2 VIEWS: CPT

## 2023-10-05 PROCEDURE — 99214 OFFICE O/P EST MOD 30 MIN: CPT

## 2023-10-05 PROCEDURE — 93000 ELECTROCARDIOGRAM COMPLETE: CPT

## 2023-10-05 PROCEDURE — 71046 X-RAY EXAM CHEST 2 VIEWS: CPT | Mod: 26

## 2023-10-05 RX ORDER — BUDESONIDE AND FORMOTEROL FUMARATE DIHYDRATE 160; 4.5 UG/1; UG/1
160-4.5 AEROSOL RESPIRATORY (INHALATION)
Qty: 30.6 | Refills: 0 | Status: DISCONTINUED | COMMUNITY
Start: 2023-02-07 | End: 2023-10-05

## 2023-10-09 ENCOUNTER — APPOINTMENT (OUTPATIENT)
Dept: CT IMAGING | Facility: CLINIC | Age: 78
End: 2023-10-09
Payer: MEDICARE

## 2023-10-09 PROCEDURE — 71250 CT THORAX DX C-: CPT | Mod: MH

## 2023-12-06 ENCOUNTER — APPOINTMENT (OUTPATIENT)
Dept: CT IMAGING | Facility: CLINIC | Age: 78
End: 2023-12-06
Payer: MEDICARE

## 2023-12-06 PROCEDURE — 71250 CT THORAX DX C-: CPT | Mod: MH

## 2024-01-29 ENCOUNTER — RX RENEWAL (OUTPATIENT)
Age: 79
End: 2024-01-29

## 2024-01-29 RX ORDER — FLUTICASONE PROPIONATE 50 UG/1
50 SPRAY, METERED NASAL DAILY
Qty: 1 | Refills: 0 | Status: ACTIVE | COMMUNITY
Start: 2023-09-27 | End: 1900-01-01

## 2024-04-19 ENCOUNTER — APPOINTMENT (OUTPATIENT)
Dept: CARDIOLOGY | Facility: CLINIC | Age: 79
End: 2024-04-19
Payer: MEDICARE

## 2024-04-19 VITALS
OXYGEN SATURATION: 95 % | SYSTOLIC BLOOD PRESSURE: 170 MMHG | WEIGHT: 165 LBS | HEIGHT: 64 IN | BODY MASS INDEX: 28.17 KG/M2 | HEART RATE: 75 BPM | RESPIRATION RATE: 14 BRPM | DIASTOLIC BLOOD PRESSURE: 60 MMHG

## 2024-04-19 VITALS — DIASTOLIC BLOOD PRESSURE: 64 MMHG | SYSTOLIC BLOOD PRESSURE: 152 MMHG

## 2024-04-19 DIAGNOSIS — E78.5 HYPERLIPIDEMIA, UNSPECIFIED: ICD-10-CM

## 2024-04-19 DIAGNOSIS — I35.0 NONRHEUMATIC AORTIC (VALVE) STENOSIS: ICD-10-CM

## 2024-04-19 DIAGNOSIS — I25.10 ATHEROSCLEROTIC HEART DISEASE OF NATIVE CORONARY ARTERY W/OUT ANGINA PECTORIS: ICD-10-CM

## 2024-04-19 DIAGNOSIS — I10 ESSENTIAL (PRIMARY) HYPERTENSION: ICD-10-CM

## 2024-04-19 PROCEDURE — 99214 OFFICE O/P EST MOD 30 MIN: CPT

## 2024-04-19 PROCEDURE — 93000 ELECTROCARDIOGRAM COMPLETE: CPT

## 2024-04-19 PROCEDURE — G2211 COMPLEX E/M VISIT ADD ON: CPT

## 2024-04-19 RX ORDER — LOSARTAN POTASSIUM AND HYDROCHLOROTHIAZIDE 12.5; 1 MG/1; MG/1
100-12.5 TABLET ORAL
Qty: 90 | Refills: 1 | Status: ACTIVE | COMMUNITY
Start: 2020-10-01 | End: 1900-01-01

## 2024-04-19 RX ORDER — AMLODIPINE BESYLATE 5 MG/1
5 TABLET ORAL DAILY
Qty: 90 | Refills: 1 | Status: ACTIVE | COMMUNITY
Start: 2021-03-15 | End: 1900-01-01

## 2024-08-23 ENCOUNTER — APPOINTMENT (OUTPATIENT)
Dept: INTERNAL MEDICINE | Facility: CLINIC | Age: 79
End: 2024-08-23
Payer: MEDICARE

## 2024-08-23 VITALS
WEIGHT: 165 LBS | RESPIRATION RATE: 14 BRPM | SYSTOLIC BLOOD PRESSURE: 98 MMHG | BODY MASS INDEX: 28.17 KG/M2 | OXYGEN SATURATION: 95 % | HEIGHT: 64 IN | HEART RATE: 60 BPM | DIASTOLIC BLOOD PRESSURE: 52 MMHG | TEMPERATURE: 98 F

## 2024-08-23 VITALS
WEIGHT: 165 LBS | TEMPERATURE: 98 F | RESPIRATION RATE: 14 BRPM | HEART RATE: 60 BPM | BODY MASS INDEX: 28.17 KG/M2 | OXYGEN SATURATION: 95 % | HEIGHT: 64 IN

## 2024-08-23 VITALS — SYSTOLIC BLOOD PRESSURE: 110 MMHG | DIASTOLIC BLOOD PRESSURE: 70 MMHG

## 2024-08-23 DIAGNOSIS — M54.50 LOW BACK PAIN, UNSPECIFIED: ICD-10-CM

## 2024-08-23 DIAGNOSIS — Z00.00 ENCOUNTER FOR GENERAL ADULT MEDICAL EXAMINATION W/OUT ABNORMAL FINDINGS: ICD-10-CM

## 2024-08-23 PROCEDURE — G0439: CPT

## 2024-08-23 NOTE — HEALTH RISK ASSESSMENT
[Good] : ~his/her~  mood as  good [No falls in past year] : Patient reported no falls in the past year [0] : 2) Feeling down, depressed, or hopeless: Not at all (0) [[id number]] : 0 [None] : None [With Significant Other] : lives with significant other [Feels Safe at Home] : Feels safe at home [Fully functional (bathing, dressing, toileting, transferring, walking, feeding)] : Fully functional (bathing, dressing, toileting, transferring, walking, feeding) [Fully functional (using the telephone, shopping, preparing meals, housekeeping, doing laundry, using] : Fully functional and needs no help or supervision to perform IADLs (using the telephone, shopping, preparing meals, housekeeping, doing laundry, using transportation, managing medications and managing finances) [Reports changes in hearing] : Reports no changes in hearing [Reports changes in vision] : Reports no changes in vision

## 2024-08-27 LAB
25(OH)D3 SERPL-MCNC: 34.3 NG/ML
ALBUMIN SERPL ELPH-MCNC: 4.9 G/DL
ALP BLD-CCNC: 69 U/L
ALT SERPL-CCNC: 42 U/L
ANION GAP SERPL CALC-SCNC: 13 MMOL/L
APPEARANCE: CLEAR
AST SERPL-CCNC: 34 U/L
BACTERIA: NEGATIVE /HPF
BILIRUB SERPL-MCNC: 0.6 MG/DL
BILIRUBIN URINE: NEGATIVE
BLOOD URINE: NEGATIVE
BUN SERPL-MCNC: 20 MG/DL
CALCIUM SERPL-MCNC: 10.4 MG/DL
CAST: 0 /LPF
CHLORIDE SERPL-SCNC: 102 MMOL/L
CHOLEST SERPL-MCNC: 129 MG/DL
CO2 SERPL-SCNC: 27 MMOL/L
COLOR: NORMAL
CREAT SERPL-MCNC: 1.31 MG/DL
EGFR: 55 ML/MIN/1.73M2
EPITHELIAL CELLS: 0 /HPF
ESTIMATED AVERAGE GLUCOSE: 137 MG/DL
FOLATE SERPL-MCNC: >20 NG/ML
GLUCOSE QUALITATIVE U: NEGATIVE MG/DL
GLUCOSE SERPL-MCNC: 122 MG/DL
HBA1C MFR BLD HPLC: 6.4 %
HCT VFR BLD CALC: 44.7 %
HDLC SERPL-MCNC: 41 MG/DL
HGB BLD-MCNC: 14.6 G/DL
KETONES URINE: NEGATIVE MG/DL
LDLC SERPL CALC-MCNC: 60 MG/DL
LEUKOCYTE ESTERASE URINE: NEGATIVE
MCHC RBC-ENTMCNC: 28.9 PG
MCHC RBC-ENTMCNC: 32.7 GM/DL
MCV RBC AUTO: 88.3 FL
MICROSCOPIC-UA: NORMAL
NITRITE URINE: NEGATIVE
NONHDLC SERPL-MCNC: 88 MG/DL
PH URINE: 6
PLATELET # BLD AUTO: 140 K/UL
POTASSIUM SERPL-SCNC: 4.2 MMOL/L
PROT SERPL-MCNC: 7.4 G/DL
PROTEIN URINE: NEGATIVE MG/DL
PSA SERPL-MCNC: 4.35 NG/ML
RBC # BLD: 5.06 M/UL
RBC # FLD: 13.6 %
RED BLOOD CELLS URINE: 1 /HPF
SODIUM SERPL-SCNC: 142 MMOL/L
SPECIFIC GRAVITY URINE: 1.02
TRIGL SERPL-MCNC: 166 MG/DL
TSH SERPL-ACNC: 5.53 UIU/ML
UROBILINOGEN URINE: 0.2 MG/DL
VIT B12 SERPL-MCNC: 711 PG/ML
WBC # FLD AUTO: 7.05 K/UL
WHITE BLOOD CELLS URINE: 0 /HPF

## 2024-09-23 ENCOUNTER — NON-APPOINTMENT (OUTPATIENT)
Age: 79
End: 2024-09-23

## 2024-09-23 DIAGNOSIS — R93.89 ABNORMAL FINDINGS ON DIAGNOSTIC IMAGING OF OTHER SPECIFIED BODY STRUCTURES: ICD-10-CM

## 2024-09-24 ENCOUNTER — APPOINTMENT (OUTPATIENT)
Facility: CLINIC | Age: 79
End: 2024-09-24
Payer: MEDICARE

## 2024-09-24 VITALS
RESPIRATION RATE: 17 BRPM | DIASTOLIC BLOOD PRESSURE: 70 MMHG | WEIGHT: 164 LBS | BODY MASS INDEX: 28 KG/M2 | OXYGEN SATURATION: 96 % | TEMPERATURE: 98 F | HEIGHT: 64 IN | SYSTOLIC BLOOD PRESSURE: 160 MMHG | HEART RATE: 65 BPM

## 2024-09-24 DIAGNOSIS — J98.01 ACUTE BRONCHOSPASM: ICD-10-CM

## 2024-09-24 DIAGNOSIS — E03.9 HYPOTHYROIDISM, UNSPECIFIED: ICD-10-CM

## 2024-09-24 DIAGNOSIS — I10 ESSENTIAL (PRIMARY) HYPERTENSION: ICD-10-CM

## 2024-09-24 DIAGNOSIS — I25.10 ATHEROSCLEROTIC HEART DISEASE OF NATIVE CORONARY ARTERY W/OUT ANGINA PECTORIS: ICD-10-CM

## 2024-09-24 DIAGNOSIS — J40 BRONCHITIS, NOT SPECIFIED AS ACUTE OR CHRONIC: ICD-10-CM

## 2024-09-24 PROCEDURE — 99203 OFFICE O/P NEW LOW 30 MIN: CPT

## 2024-09-24 RX ORDER — PREDNISONE 20 MG/1
20 TABLET ORAL DAILY
Qty: 14 | Refills: 3 | Status: ACTIVE | COMMUNITY
Start: 2024-09-24 | End: 1900-01-01

## 2024-09-24 NOTE — CONSULT LETTER
[Dear  ___] : Dear  [unfilled], [Consult Letter:] : I had the pleasure of evaluating your patient, [unfilled]. [Please see my note below.] : Please see my note below. [Consult Closing:] : Thank you very much for allowing me to participate in the care of this patient.  If you have any questions, please do not hesitate to contact me. [Sincerely,] : Sincerely, [FreeTextEntry2] : Trisha Mccarthy [FreeTextEntry3] :  Markus Goldstein MD FACP FCCP

## 2024-09-24 NOTE — DISCUSSION/SUMMARY
[FreeTextEntry1] : This patient likely has acute tracheobronchitis.  Will give him trial of prednisone 20 mg a day for 1 week.  He may use Breyna and albuterol nebulizer.  Advised to call if unimproved.  I will see him in 4 months and do a PFT.

## 2024-09-24 NOTE — HISTORY OF PRESENT ILLNESS
[TextBox_4] : 79-year-old male has had a cough which is nonproductive over the last week.  He denies fever chills or sweats he denies wheezing.  The cough causes a headache.  He has been using Breyna and his nebulizer.

## 2024-11-23 NOTE — REVIEW OF SYSTEMS
Copied from CRM #[id number]. Topic: MW Schedule Appointment -  Schedule Primary Care  >> Nov 23, 2024  1:42 PM Dyan NEGRETE wrote:  Devon Boland called requesting to schedule a primary care visit with a Clinician. Checked insurance.  Looked for any active requests, recalls, service to orders, scheduling tickets prior to scheduling. The decision tree DT PC Was Not used for scheduling (an)     Non-Acute need. Not scheduled and followed instructions to message or transfer. Sent a message. Selected 'Wrap up CRM' and created new Telephone Encounter after clicking 'Convert to Clinical Call'. Selected reason for call 'Appointment'. Sent 'Appointment' template and routed as routine priority per Clinician KB page to appropriate clinician pool.      -- DO NOT REPLY / DO NOT REPLY ALL --  -- This inbox is not monitored. If this was sent to the wrong provider or department, reroute message to P ECO Reroute pool. --  -- Message is from Engagement Center Operations (ECO) --    Request for Medical Clearance    PreOp Appointment Scheduled?  No    Type of surgery: Nasal Pharyngeal Biopsy under general anesthesia  Location of surgery: [facility]  Date of surgery: 12/05/2024  Surgeon Name: Gold Sanz    Other information: n,a     Caller Information       Contact Date/Time Type Contact Phone/Fax    11/23/2024 01:42 PM CST Phone (Incoming) Devon Boland [phone]            Alternative phone number: n/a    Can a detailed message be left?  Yes - Voicemail     Patient has been advised the message will be addressed within 2-3 business days         [Negative] : Heme/Lymph [Back Pain] : back pain

## 2024-12-13 ENCOUNTER — APPOINTMENT (OUTPATIENT)
Dept: ORTHOPEDIC SURGERY | Facility: CLINIC | Age: 79
End: 2024-12-13
Payer: MEDICARE

## 2024-12-13 DIAGNOSIS — Z98.890 OTHER SPECIFIED POSTPROCEDURAL STATES: ICD-10-CM

## 2024-12-13 DIAGNOSIS — M54.16 RADICULOPATHY, LUMBAR REGION: ICD-10-CM

## 2024-12-13 DIAGNOSIS — M48.062 SPINAL STENOSIS, LUMBAR REGION WITH NEUROGENIC CLAUDICATION: ICD-10-CM

## 2024-12-13 DIAGNOSIS — M50.30 OTHER CERVICAL DISC DEGENERATION, UNSPECIFIED CERVICAL REGION: ICD-10-CM

## 2024-12-13 PROCEDURE — 72110 X-RAY EXAM L-2 SPINE 4/>VWS: CPT

## 2024-12-13 PROCEDURE — 99214 OFFICE O/P EST MOD 30 MIN: CPT

## 2024-12-13 RX ORDER — TIZANIDINE 2 MG/1
2 TABLET ORAL EVERY 8 HOURS
Qty: 30 | Refills: 2 | Status: ACTIVE | COMMUNITY
Start: 2024-12-13 | End: 1900-01-01

## 2024-12-17 ENCOUNTER — APPOINTMENT (OUTPATIENT)
Facility: CLINIC | Age: 79
End: 2024-12-17

## 2024-12-19 ENCOUNTER — NON-APPOINTMENT (OUTPATIENT)
Age: 79
End: 2024-12-19

## 2024-12-19 ENCOUNTER — APPOINTMENT (OUTPATIENT)
Dept: CARDIOLOGY | Facility: CLINIC | Age: 79
End: 2024-12-19
Payer: MEDICARE

## 2024-12-19 VITALS
HEIGHT: 64 IN | DIASTOLIC BLOOD PRESSURE: 60 MMHG | OXYGEN SATURATION: 97 % | SYSTOLIC BLOOD PRESSURE: 164 MMHG | WEIGHT: 162 LBS | HEART RATE: 70 BPM | BODY MASS INDEX: 27.66 KG/M2

## 2024-12-19 VITALS — SYSTOLIC BLOOD PRESSURE: 128 MMHG | DIASTOLIC BLOOD PRESSURE: 60 MMHG

## 2024-12-19 DIAGNOSIS — E78.5 HYPERLIPIDEMIA, UNSPECIFIED: ICD-10-CM

## 2024-12-19 DIAGNOSIS — I35.0 NONRHEUMATIC AORTIC (VALVE) STENOSIS: ICD-10-CM

## 2024-12-19 DIAGNOSIS — I25.10 ATHEROSCLEROTIC HEART DISEASE OF NATIVE CORONARY ARTERY W/OUT ANGINA PECTORIS: ICD-10-CM

## 2024-12-19 DIAGNOSIS — I10 ESSENTIAL (PRIMARY) HYPERTENSION: ICD-10-CM

## 2024-12-19 PROCEDURE — G2211 COMPLEX E/M VISIT ADD ON: CPT

## 2024-12-19 PROCEDURE — 99214 OFFICE O/P EST MOD 30 MIN: CPT

## 2024-12-19 PROCEDURE — 93000 ELECTROCARDIOGRAM COMPLETE: CPT

## 2024-12-27 ENCOUNTER — TRANSCRIPTION ENCOUNTER (OUTPATIENT)
Age: 79
End: 2024-12-27

## 2024-12-27 NOTE — ASU DISCHARGE PLAN (ADULT/PEDIATRIC) - FINANCIAL ASSISTANCE
[facility] provides services at a reduced cost to those who are determined to be eligible through [facility]’s financial assistance program. Information regarding [facility]’s financial assistance program can be found by going to [email or web address] or by calling [phone].

## 2025-01-03 ENCOUNTER — APPOINTMENT (OUTPATIENT)
Dept: ORTHOPEDIC SURGERY | Facility: HOSPITAL | Age: 80
End: 2025-01-03

## 2025-01-03 ENCOUNTER — OUTPATIENT (OUTPATIENT)
Dept: OUTPATIENT SERVICES | Facility: HOSPITAL | Age: 80
LOS: 1 days | End: 2025-01-03
Payer: MEDICARE

## 2025-01-03 VITALS
HEART RATE: 63 BPM | TEMPERATURE: 97 F | RESPIRATION RATE: 21 BRPM | WEIGHT: 160.06 LBS | SYSTOLIC BLOOD PRESSURE: 147 MMHG | OXYGEN SATURATION: 97 % | DIASTOLIC BLOOD PRESSURE: 56 MMHG

## 2025-01-03 VITALS
SYSTOLIC BLOOD PRESSURE: 142 MMHG | OXYGEN SATURATION: 98 % | DIASTOLIC BLOOD PRESSURE: 52 MMHG | HEART RATE: 71 BPM | RESPIRATION RATE: 17 BRPM

## 2025-01-03 DIAGNOSIS — M48.10 ANKYLOSING HYPEROSTOSIS [FORESTIER], SITE UNSPECIFIED: ICD-10-CM

## 2025-01-03 DIAGNOSIS — M51.36 OTHER INTERVERTEBRAL DISC DEGENERATION, LUMBAR REGION: ICD-10-CM

## 2025-01-03 DIAGNOSIS — S76.119A STRAIN OF UNSPECIFIED QUADRICEPS MUSCLE, FASCIA AND TENDON, INITIAL ENCOUNTER: Chronic | ICD-10-CM

## 2025-01-03 DIAGNOSIS — I25.10 ATHEROSCLEROTIC HEART DISEASE OF NATIVE CORONARY ARTERY WITHOUT ANGINA PECTORIS: Chronic | ICD-10-CM

## 2025-01-03 DIAGNOSIS — M54.16 RADICULOPATHY, LUMBAR REGION: ICD-10-CM

## 2025-01-03 DIAGNOSIS — Z98.49 CATARACT EXTRACTION STATUS, UNSPECIFIED EYE: Chronic | ICD-10-CM

## 2025-01-03 DIAGNOSIS — Z98.890 OTHER SPECIFIED POSTPROCEDURAL STATES: ICD-10-CM

## 2025-01-03 DIAGNOSIS — Z98.890 OTHER SPECIFIED POSTPROCEDURAL STATES: Chronic | ICD-10-CM

## 2025-01-03 PROCEDURE — 64484 NJX AA&/STRD TFRM EPI L/S EA: CPT | Mod: LT

## 2025-01-03 PROCEDURE — 64483 NJX AA&/STRD TFRM EPI L/S 1: CPT | Mod: LT

## 2025-01-03 NOTE — H&P PST ADULT - HISTORY OF PRESENT ILLNESS
Patients reports h/o fall and back pain. Back pain radiates down to left leg. Pain is worst with ambulation  with numbness to toes. Pain is mildly relieved with pain medications.

## 2025-01-03 NOTE — H&P PST ADULT - HEART RATE (BEATS/MIN)
----- Message from Micha Holguin sent at 7/12/2017  1:40 PM CDT -----  Regarding: Home care nurse looking for orders  Contact: [phone]  Joshua from [facility] orders for pt.    Joshua can be reached at [phone].      Message left for Joshua from [facility] to call back.  Gabriela Hylton RN 9:08 AM on 7/13/2017.  Left message for Bette to call back.  Gabriela Hylton RN 8:35 AM on 7/20/2017.        
----- Message from Micha Holguin sent at 7/12/2017  1:40 PM CDT -----  Regarding: Home care nurse looking for orders  Contact: [phone]  Joshua from [facility] requests Home Care orders for pt.    Joshua can be reached at [phone].    Thank You,  Micha     Please DO NOT send this message and/or reply back to sender.  Call Center Representatives DO NOT respond to messages.    
63

## 2025-01-07 NOTE — DISCHARGE NOTE PROVIDER - CARE PROVIDER_API CALL
Reached out to the [employer] for the Urgent Need program at patient's request. SUSAN Martinez was informed that there is no funding available at this time. SUSAN Martinez informed patient and her mother of above.   Scar Robertson (MD)  Orthopaedic Surgery  [address], [address]  [place] [address]  Phone: [phone]  Fax: [phone]  Follow Up Time:

## 2025-03-11 NOTE — ASU DISCHARGE PLAN (ADULT/PEDIATRIC) - ASU DC SPECIAL INSTRUCTIONSFT
Pt is scheduled for virtual for med check no further needs.     Makenzie AZAR Visit Facilitator      ice 20 minutes on alternating with 20 minutes off for 48 hours post op  mobilize digits 20 times per hour to reduce possible stiffness and /or swelling  keep dressing clean and dry and intact until seen in office

## 2025-04-09 ENCOUNTER — APPOINTMENT (OUTPATIENT)
Facility: CLINIC | Age: 80
End: 2025-04-09
Payer: MEDICARE

## 2025-04-09 VITALS
OXYGEN SATURATION: 93 % | DIASTOLIC BLOOD PRESSURE: 67 MMHG | TEMPERATURE: 98.2 F | HEIGHT: 64 IN | WEIGHT: 163 LBS | HEART RATE: 88 BPM | RESPIRATION RATE: 19 BRPM | BODY MASS INDEX: 27.83 KG/M2 | SYSTOLIC BLOOD PRESSURE: 154 MMHG

## 2025-04-09 DIAGNOSIS — K21.9 GASTRO-ESOPHAGEAL REFLUX DISEASE W/OUT ESOPHAGITIS: ICD-10-CM

## 2025-04-09 DIAGNOSIS — E03.9 HYPOTHYROIDISM, UNSPECIFIED: ICD-10-CM

## 2025-04-09 DIAGNOSIS — N40.1 BENIGN PROSTATIC HYPERPLASIA WITH LOWER URINARY TRACT SYMPMS: ICD-10-CM

## 2025-04-09 DIAGNOSIS — I25.10 ATHEROSCLEROTIC HEART DISEASE OF NATIVE CORONARY ARTERY W/OUT ANGINA PECTORIS: ICD-10-CM

## 2025-04-09 DIAGNOSIS — R93.89 ABNORMAL FINDINGS ON DIAGNOSTIC IMAGING OF OTHER SPECIFIED BODY STRUCTURES: ICD-10-CM

## 2025-04-09 DIAGNOSIS — J20.9 ACUTE BRONCHITIS, UNSPECIFIED: ICD-10-CM

## 2025-04-09 DIAGNOSIS — E78.5 HYPERLIPIDEMIA, UNSPECIFIED: ICD-10-CM

## 2025-04-09 DIAGNOSIS — I35.0 NONRHEUMATIC AORTIC (VALVE) STENOSIS: ICD-10-CM

## 2025-04-09 DIAGNOSIS — I10 ESSENTIAL (PRIMARY) HYPERTENSION: ICD-10-CM

## 2025-04-09 DIAGNOSIS — N13.8 BENIGN PROSTATIC HYPERPLASIA WITH LOWER URINARY TRACT SYMPMS: ICD-10-CM

## 2025-04-09 PROCEDURE — 99214 OFFICE O/P EST MOD 30 MIN: CPT

## 2025-04-09 RX ORDER — PREDNISONE 20 MG/1
20 TABLET ORAL DAILY
Qty: 14 | Refills: 2 | Status: ACTIVE | COMMUNITY
Start: 2025-04-09 | End: 1900-01-01

## 2025-04-09 RX ORDER — AZITHROMYCIN 250 MG/1
250 TABLET, FILM COATED ORAL
Qty: 1 | Refills: 0 | Status: ACTIVE | COMMUNITY
Start: 2025-04-09 | End: 1900-01-01

## 2025-04-09 RX ORDER — BUDESONIDE AND FORMOTEROL FUMARATE 160; 4.5 UG/1; UG/1
160-4.5 AEROSOL, METERED RESPIRATORY (INHALATION)
Qty: 1 | Refills: 5 | Status: ACTIVE | COMMUNITY
Start: 2025-04-09 | End: 1900-01-01

## 2025-05-22 ENCOUNTER — APPOINTMENT (OUTPATIENT)
Facility: CLINIC | Age: 80
End: 2025-05-22
Payer: MEDICARE

## 2025-05-22 VITALS
TEMPERATURE: 97.9 F | WEIGHT: 160 LBS | RESPIRATION RATE: 16 BRPM | OXYGEN SATURATION: 96 % | DIASTOLIC BLOOD PRESSURE: 67 MMHG | HEIGHT: 64 IN | BODY MASS INDEX: 27.31 KG/M2 | SYSTOLIC BLOOD PRESSURE: 130 MMHG | HEART RATE: 66 BPM

## 2025-05-22 DIAGNOSIS — I35.0 NONRHEUMATIC AORTIC (VALVE) STENOSIS: ICD-10-CM

## 2025-05-22 DIAGNOSIS — E78.5 HYPERLIPIDEMIA, UNSPECIFIED: ICD-10-CM

## 2025-05-22 DIAGNOSIS — K21.9 GASTRO-ESOPHAGEAL REFLUX DISEASE W/OUT ESOPHAGITIS: ICD-10-CM

## 2025-05-22 DIAGNOSIS — I25.10 ATHEROSCLEROTIC HEART DISEASE OF NATIVE CORONARY ARTERY W/OUT ANGINA PECTORIS: ICD-10-CM

## 2025-05-22 DIAGNOSIS — J98.01 ACUTE BRONCHOSPASM: ICD-10-CM

## 2025-05-22 PROCEDURE — 94727 GAS DIL/WSHOT DETER LNG VOL: CPT

## 2025-05-22 PROCEDURE — 94729 DIFFUSING CAPACITY: CPT

## 2025-05-22 PROCEDURE — 99213 OFFICE O/P EST LOW 20 MIN: CPT | Mod: 25

## 2025-05-22 PROCEDURE — 94060 EVALUATION OF WHEEZING: CPT

## 2025-05-23 ENCOUNTER — APPOINTMENT (OUTPATIENT)
Dept: UROLOGY | Facility: CLINIC | Age: 80
End: 2025-05-23
Payer: MEDICARE

## 2025-05-23 DIAGNOSIS — N40.1 BENIGN PROSTATIC HYPERPLASIA WITH LOWER URINARY TRACT SYMPMS: ICD-10-CM

## 2025-05-23 DIAGNOSIS — R97.20 ELEVATED PROSTATE, SPECIFIC ANTIGEN [PSA]: ICD-10-CM

## 2025-05-23 DIAGNOSIS — E03.9 HYPOTHYROIDISM, UNSPECIFIED: ICD-10-CM

## 2025-05-23 DIAGNOSIS — N13.8 BENIGN PROSTATIC HYPERPLASIA WITH LOWER URINARY TRACT SYMPMS: ICD-10-CM

## 2025-05-23 DIAGNOSIS — I10 ESSENTIAL (PRIMARY) HYPERTENSION: ICD-10-CM

## 2025-05-23 PROCEDURE — 99204 OFFICE O/P NEW MOD 45 MIN: CPT | Mod: 25

## 2025-05-23 PROCEDURE — 51798 US URINE CAPACITY MEASURE: CPT

## 2025-05-27 LAB
APPEARANCE: CLEAR
BILIRUBIN URINE: NEGATIVE
BLOOD URINE: NEGATIVE
COLOR: YELLOW
GLUCOSE QUALITATIVE U: NEGATIVE MG/DL
KETONES URINE: NEGATIVE MG/DL
LEUKOCYTE ESTERASE URINE: NEGATIVE
NITRITE URINE: NEGATIVE
PH URINE: 6.5
PROTEIN URINE: NEGATIVE MG/DL
SPECIFIC GRAVITY URINE: 1.01
UROBILINOGEN URINE: 0.2 MG/DL

## 2025-05-30 ENCOUNTER — APPOINTMENT (OUTPATIENT)
Dept: UROLOGY | Facility: CLINIC | Age: 80
End: 2025-05-30
Payer: MEDICARE

## 2025-05-30 LAB
PSA FREE FLD-MCNC: 29 %
PSA FREE SERPL-MCNC: 1.45 NG/ML
PSA SERPL-MCNC: 5.02 NG/ML
URINE CYTOLOGY: NORMAL

## 2025-05-30 PROCEDURE — 99213 OFFICE O/P EST LOW 20 MIN: CPT | Mod: 93

## 2025-06-12 ENCOUNTER — APPOINTMENT (OUTPATIENT)
Dept: CARDIOLOGY | Facility: CLINIC | Age: 80
End: 2025-06-12
Payer: MEDICARE

## 2025-06-12 VITALS
WEIGHT: 161 LBS | BODY MASS INDEX: 27.49 KG/M2 | OXYGEN SATURATION: 97 % | HEIGHT: 64 IN | SYSTOLIC BLOOD PRESSURE: 140 MMHG | HEART RATE: 60 BPM | DIASTOLIC BLOOD PRESSURE: 68 MMHG

## 2025-06-12 VITALS
HEIGHT: 64 IN | DIASTOLIC BLOOD PRESSURE: 68 MMHG | BODY MASS INDEX: 27.49 KG/M2 | RESPIRATION RATE: 16 BRPM | OXYGEN SATURATION: 97 % | HEART RATE: 60 BPM | SYSTOLIC BLOOD PRESSURE: 120 MMHG | WEIGHT: 161 LBS

## 2025-06-12 PROBLEM — Z95.5 STENTED CORONARY ARTERY: Status: ACTIVE | Noted: 2025-06-12

## 2025-06-12 PROCEDURE — G2211 COMPLEX E/M VISIT ADD ON: CPT

## 2025-06-12 PROCEDURE — 93000 ELECTROCARDIOGRAM COMPLETE: CPT

## 2025-06-12 PROCEDURE — 99214 OFFICE O/P EST MOD 30 MIN: CPT

## 2025-06-12 RX ORDER — ASPIRIN 81 MG
81 TABLET, DELAYED RELEASE (ENTERIC COATED) ORAL DAILY
Refills: 0 | Status: ACTIVE | COMMUNITY

## 2025-06-24 ENCOUNTER — APPOINTMENT (OUTPATIENT)
Dept: CARDIOLOGY | Facility: CLINIC | Age: 80
End: 2025-06-24
Payer: MEDICARE

## 2025-06-24 PROCEDURE — 78452 HT MUSCLE IMAGE SPECT MULT: CPT

## 2025-06-24 PROCEDURE — A9500: CPT

## 2025-06-24 PROCEDURE — 93015 CV STRESS TEST SUPVJ I&R: CPT

## 2025-07-13 NOTE — PHYSICAL EXAM
Awaiting call back from provider Awaiting call back from provider    00:40 - Provider to order 2L O2 via nasal cannula [Well Developed] : well developed [Well Nourished] : well nourished [No Acute Distress] : no acute distress [Normal Conjunctiva] : normal conjunctiva [Normal Venous Pressure] : normal venous pressure [No Carotid Bruit] : no carotid bruit [Normal S1, S2] : normal S1, S2 [No Rub] : no rub [No Gallop] : no gallop [Murmur] : murmur [Clear Lung Fields] : clear lung fields [Good Air Entry] : good air entry [No Respiratory Distress] : no respiratory distress  [Soft] : abdomen soft [Non Tender] : non-tender [No Masses/organomegaly] : no masses/organomegaly [Normal Bowel Sounds] : normal bowel sounds [Normal Gait] : normal gait [No Edema] : no edema [No Cyanosis] : no cyanosis [No Clubbing] : no clubbing [No Varicosities] : no varicosities [No Rash] : no rash [No Skin Lesions] : no skin lesions [Moves all extremities] : moves all extremities [No Focal Deficits] : no focal deficits [Normal Speech] : normal speech [Alert and Oriented] : alert and oriented [Normal memory] : normal memory [de-identified] : 3/6 ESM  intact S2

## 2025-08-28 ENCOUNTER — RX RENEWAL (OUTPATIENT)
Age: 80
End: 2025-08-28

## 2025-08-29 ENCOUNTER — RESULT CHARGE (OUTPATIENT)
Age: 80
End: 2025-08-29

## 2025-08-29 ENCOUNTER — APPOINTMENT (OUTPATIENT)
Dept: UROLOGY | Facility: CLINIC | Age: 80
End: 2025-08-29
Payer: MEDICARE

## 2025-08-29 DIAGNOSIS — R97.20 ELEVATED PROSTATE, SPECIFIC ANTIGEN [PSA]: ICD-10-CM

## 2025-08-29 PROCEDURE — 51798 US URINE CAPACITY MEASURE: CPT

## 2025-08-29 PROCEDURE — 99213 OFFICE O/P EST LOW 20 MIN: CPT | Mod: 25

## 2025-09-10 ENCOUNTER — APPOINTMENT (OUTPATIENT)
Dept: INTERNAL MEDICINE | Facility: CLINIC | Age: 80
End: 2025-09-10
Payer: MEDICARE

## 2025-09-10 VITALS
BODY MASS INDEX: 28 KG/M2 | RESPIRATION RATE: 16 BRPM | TEMPERATURE: 99.1 F | WEIGHT: 164 LBS | HEART RATE: 80 BPM | SYSTOLIC BLOOD PRESSURE: 128 MMHG | OXYGEN SATURATION: 96 % | HEIGHT: 64 IN | DIASTOLIC BLOOD PRESSURE: 50 MMHG

## 2025-09-10 DIAGNOSIS — M51.369: ICD-10-CM

## 2025-09-10 DIAGNOSIS — E78.5 HYPERLIPIDEMIA, UNSPECIFIED: ICD-10-CM

## 2025-09-10 DIAGNOSIS — Z00.00 ENCOUNTER FOR GENERAL ADULT MEDICAL EXAMINATION W/OUT ABNORMAL FINDINGS: ICD-10-CM

## 2025-09-10 DIAGNOSIS — E03.9 HYPOTHYROIDISM, UNSPECIFIED: ICD-10-CM

## 2025-09-10 PROCEDURE — G0439: CPT

## 2025-09-11 ENCOUNTER — APPOINTMENT (OUTPATIENT)
Facility: CLINIC | Age: 80
End: 2025-09-11

## (undated) DEVICE — DRAPE COVER TABLE 44X75"

## (undated) DEVICE — SOLIDIFIER CANN EXPRESS 3K

## (undated) DEVICE — SPONGE RAYTEC 4X4 16PLY

## (undated) DEVICE — DRSG 4 X 4" 6PLY

## (undated) DEVICE — PACK SPINE

## (undated) DEVICE — CANISTER SUCTION LID GUARD 3000CC

## (undated) DEVICE — CATH IV SAFE BC BLU 22GAX1.0"

## (undated) DEVICE — DRSG STERISTRIPS 0.5X4"

## (undated) DEVICE — GLV 7.5 PROTEXIS

## (undated) DEVICE — TUBING IV EXTENSION PUMP MICROBORE LUER LOCK 36"

## (undated) DEVICE — POSITIONER STRAP ARMBOARD VELCRO TS-30 12

## (undated) DEVICE — DRSG 4 X 4" 6PLY STERILE

## (undated) DEVICE — BLANKET WARMER UPPER ADULT

## (undated) DEVICE — SUT POLYSORB 1 30" GS-10 UNDYED

## (undated) DEVICE — SUT MONOCRYL 3-0 27" PS-2 UNDYED

## (undated) DEVICE — GLV 8 PROTEXIS

## (undated) DEVICE — WRAP COMPRESSION CALF LG

## (undated) DEVICE — ELCTR PENCIL NEPTUNE SMOKE EVACUATION

## (undated) DEVICE — DRAPE MAYO STAND 30"

## (undated) DEVICE — DRAPE 3/4 SHEET W REINFORCEMENT 56X77"

## (undated) DEVICE — DRAPE TOWEL BLUE 17" X 24"

## (undated) DEVICE — ELCTR EDGE BOVIE INSULATED BLADE TIP 2.75"

## (undated) DEVICE — CONTAINER SPECIMEN 100ML

## (undated) DEVICE — TRAY EPIDURAL SINGLE DOSE

## (undated) DEVICE — ELCTR EDGE BOVIE INSULATED BLADE TIP 4"

## (undated) DEVICE — DRAPE 1/2 SHEET 40X57"

## (undated) DEVICE — PREP DURAPREP 6CC

## (undated) DEVICE — SOL IRR POUR H2O 1500ML

## (undated) DEVICE — NDL SPINAL 22G X 3.5" QUINCKE

## (undated) DEVICE — SOL TOPICAL POVIDONE IODINE PVP-1

## (undated) DEVICE — SUT POLYSORB 2-0 30" GS-11 UNDYED

## (undated) DEVICE — SUT POLYSORB 1 27" GS-21 UNDYED

## (undated) DEVICE — POSITIONER FOAM HEAD CRADLE

## (undated) DEVICE — BUR STRYKER NEURO DRILL 3X3.8MM

## (undated) DEVICE — GLV 8.5 PROTEXIS

## (undated) DEVICE — GLV 7.5 ULTRAFREE MAX

## (undated) DEVICE — DRAPE PEDIATRIC DIRECTIONAL INCISION

## (undated) DEVICE — SOL IRR POUR NS 0.9% 500ML

## (undated) DEVICE — SUT MONOSOF 2-0 18" C-15

## (undated) DEVICE — Device

## (undated) DEVICE — PACK IV START WITH CHG

## (undated) DEVICE — CATH IV SAFE 24GX3/4

## (undated) DEVICE — SOL INJ LR 500ML

## (undated) DEVICE — WRAP COMPRESSION CALF MED

## (undated) DEVICE — DRAPE C ARM UNIVERSAL

## (undated) DEVICE — DRSG 4X4

## (undated) DEVICE — NDL SPNL WHIT 22GX3.5IN

## (undated) DEVICE — GLV 8 ULTRAFREE MAX

## (undated) DEVICE — DRAPE MICROSCOPE LEICA  26X150"

## (undated) DEVICE — NDL SPINAL 18G X 3.5"

## (undated) DEVICE — PREP SCRUB IODO DURAPREP 26CC